# Patient Record
Sex: FEMALE | Race: WHITE | URBAN - METROPOLITAN AREA
[De-identification: names, ages, dates, MRNs, and addresses within clinical notes are randomized per-mention and may not be internally consistent; named-entity substitution may affect disease eponyms.]

---

## 2020-06-10 ENCOUNTER — LAB OUTSIDE AN ENCOUNTER (OUTPATIENT)
Dept: URBAN - METROPOLITAN AREA CLINIC 98 | Facility: CLINIC | Age: 55
End: 2020-06-10

## 2020-06-11 LAB
A/G RATIO: 1.5
ABSOLUTE BASOPHILS: 53
ABSOLUTE EOSINOPHILS: 137
ABSOLUTE LYMPHOCYTES: 1072
ABSOLUTE MONOCYTES: 540
ABSOLUTE NEUTROPHILS: 5799
ALBUMIN: 4.6
ALKALINE PHOSPHATASE: 47
ALT (SGPT): 7
AST (SGOT): 11
BASOPHILS: 0.7
BILIRUBIN, TOTAL: 0.3
BUN/CREATININE RATIO: (no result)
BUN: 18
C-REACTIVE PROTEIN, QUANT: 19.4
CALCIUM: 9.8
CARBON DIOXIDE, TOTAL: 21
CHLORIDE: 108
CREATININE: 0.76
EGFR AFRICAN AMERICAN: 103
EGFR NON-AFR. AMERICAN: 89
EOSINOPHILS: 1.8
FERRITIN, SERUM: 2204
FOLATE, SERUM: >24
GLOBULIN, TOTAL: 3
GLUCOSE: 91
HEMATOCRIT: 35.5
HEMOGLOBIN: 12.4
IRON BIND.CAP.(TIBC): 288
IRON SATURATION: 48
IRON: 139
LYMPHOCYTES: 14.1
MCH: 36.9
MCHC: 34.9
MCV: 105.7
MONOCYTES: 7.1
MPV: 10.5
NEUTROPHILS: 76.3
PLATELET COUNT: 266
POTASSIUM: 3.6
PROTEIN, TOTAL: 7.6
RDW: 12.5
RED BLOOD CELL COUNT: 3.36
SED RATE BY MODIFIED: 56
SODIUM: 134
VITAMIN B12: 1636
VITAMIN D,25-OH,TOTAL,IA: 30
WHITE BLOOD CELL COUNT: 7.6

## 2020-07-28 ENCOUNTER — LAB OUTSIDE AN ENCOUNTER (OUTPATIENT)
Dept: URBAN - METROPOLITAN AREA CLINIC 98 | Facility: CLINIC | Age: 55
End: 2020-07-28

## 2020-07-29 LAB
A/G RATIO: 1.3
ABSOLUTE BASOPHILS: 59
ABSOLUTE EOSINOPHILS: 189
ABSOLUTE LYMPHOCYTES: 1151
ABSOLUTE MONOCYTES: 241
ABSOLUTE NEUTROPHILS: 4862
ALBUMIN: 4
ALKALINE PHOSPHATASE: 45
ALT (SGPT): 6
AST (SGOT): 10
BASOPHILS: 0.9
BILIRUBIN, TOTAL: 0.2
BUN/CREATININE RATIO: (no result)
BUN: 20
C-REACTIVE PROTEIN, QUANT: 34.5
CALCIUM: 9.3
CARBON DIOXIDE, TOTAL: 25
CHLORIDE: 104
CREATININE: 1
EGFR AFRICAN AMERICAN: 74
EGFR NON-AFR. AMERICAN: 64
EOSINOPHILS: 2.9
FERRITIN, SERUM: 1688
FOLATE (FOLIC ACID), SERUM: >24
GLOBULIN, TOTAL: 3.1
GLUCOSE: 98
HEMATOCRIT: 30.3
HEMOGLOBIN: 10.3
IRON BIND.CAP.(TIBC): 271
IRON SATURATION: 57
IRON: 155
LYMPHOCYTES: 17.7
MAGNESIUM: 1.4
MCH: 36.3
MCHC: 34
MCV: 106.7
MONOCYTES: 3.7
MPV: 10.5
NEUTROPHILS: 74.8
PLATELET COUNT: 301
POTASSIUM: 4.6
PROTEIN, TOTAL: 7.1
RDW: 12.6
RED BLOOD CELL COUNT: 2.84
SED RATE BY MODIFIED: 62
SODIUM: 137
VITAMIN B12: 591
VITAMIN D,25-OH,TOTAL,IA: 36
WHITE BLOOD CELL COUNT: 6.5

## 2020-08-06 ENCOUNTER — OFFICE VISIT (OUTPATIENT)
Dept: URBAN - METROPOLITAN AREA TELEHEALTH 2 | Facility: TELEHEALTH | Age: 55
End: 2020-08-06
Payer: COMMERCIAL

## 2020-08-06 DIAGNOSIS — K80.20 CHOLELITHIASIS: ICD-10-CM

## 2020-08-06 DIAGNOSIS — R19.7 DIARRHEA: ICD-10-CM

## 2020-08-06 DIAGNOSIS — R10.84 ABDOMINAL PAIN, GENERALIZED: ICD-10-CM

## 2020-08-06 DIAGNOSIS — K50.90 CROHN DISEASE: ICD-10-CM

## 2020-08-06 PROCEDURE — 99215 OFFICE O/P EST HI 40 MIN: CPT | Performed by: INTERNAL MEDICINE

## 2020-08-06 RX ORDER — RABEPRAZOLE SODIUM 20 MG/1
TAKE 1 TABLET BY MOUTH TWICE DAILY TABLET, DELAYED RELEASE ORAL
Qty: 180 | Refills: 3 | COMMUNITY
Start: 2020-02-26

## 2020-08-06 RX ORDER — METHOTREXATE 25 MG/ML
1 CC INJECTION, SOLUTION INTRA-ARTERIAL; INTRAMUSCULAR; INTRAVENOUS
Qty: 2 | Refills: 5 | OUTPATIENT

## 2020-08-06 RX ORDER — VEDOLIZUMAB 300 MG/5ML
INFUSE 300 MG OVER 30 MINUTE(S) BY INTRAVENOUS ROUTE EVERY 4 WEEKS INJECTION, POWDER, LYOPHILIZED, FOR SOLUTION INTRAVENOUS
Qty: 1 | Refills: 11 | COMMUNITY
Start: 1900-01-01

## 2020-08-06 RX ORDER — LETROZOLE TABLETS 2.5 MG/1
TAKE 1 TABLET (2.5 MG) BY ORAL ROUTE ONCE DAILY TABLET, FILM COATED ORAL 1
Qty: 0 | Refills: 0 | COMMUNITY
Start: 1900-01-01

## 2020-08-06 RX ORDER — RABEPRAZOLE SODIUM 20 MG/1
TAKE 1 TABLET BY MOUTH TWICE DAILY TABLET, DELAYED RELEASE ORAL
Qty: 180 | Refills: 3 | COMMUNITY
Start: 2019-02-17

## 2020-08-06 RX ORDER — DIPHENOXYLATE HCL/ATROPINE 2.5-.025MG
TAKE 2 TABLETS (5 MG) BY ORAL ROUTE 2 TIMES PER DAY FOR 90 DAYS TABLET ORAL 2
Qty: 360 | Refills: 0 | COMMUNITY
Start: 1900-01-01

## 2020-08-06 RX ORDER — ALENDRONATE SODIUM 70 MG
TABLET ORAL
Qty: 0 | Refills: 0 | COMMUNITY
Start: 1900-01-01

## 2020-08-06 RX ORDER — CYANOCOBALAMIN 1000 UG/ML
INJECT 1ML INTRAMUSCULARLY ONCE EVERY MONTH INJECTION INTRAMUSCULAR; SUBCUTANEOUS
Qty: 12 | Refills: 0 | COMMUNITY
Start: 2018-07-02

## 2020-08-06 RX ORDER — IBUPROFEN 100 MG/1
TAKE 2 TABLETS (200 MG) BY ORAL ROUTE EVERY 6 HOURS AS NEEDED WITH FOOD TABLET, COATED ORAL
Qty: 0 | Refills: 0 | COMMUNITY
Start: 1900-01-01

## 2020-08-06 RX ORDER — IBUPROFEN 200 MG/1
TAKE 1 CAPSULE (200 MG) BY ORAL ROUTE EVERY 6 HOURS AS NEEDED CAPSULE, LIQUID FILLED ORAL
Qty: 0 | Refills: 0 | COMMUNITY
Start: 1900-01-01

## 2020-08-06 RX ORDER — METHOTREXATE SODIUM 25 MG/ML
INJECT 0.5 ML  BY SUBCUTANEOUS ROUTE ONCE WEEKLY INJECTION, SOLUTION INTRA-ARTERIAL; INTRAMUSCULAR; INTRAVENOUS
Qty: 1 | Refills: 0 | COMMUNITY
Start: 1900-01-01

## 2020-08-06 NOTE — HPI-TODAY'S VISIT:
55 yo female with Crohnn's disease having continued active syx. Getting Entyvio every 4 weeks. Methotrexate 12.5 mg per week Dicyclomine tablets 2 po qid  == She has taken Xifaxan and it tears her up.   Then stop it. Does not like flagyl and does not help.  Pepto 1-2 doses a day, does help the diarreha. She uses Immodium every day.  Stooling ----- empties bag 6-10 times a day. Abdominal pain - when she eats and intermittent. Energy - gone.  Labs- Anemia - Hb lower CRP is higher ---34 (was 16).   ========================== 20  Crohn's disease.     55 yo female with Crohn's disease comes in for f/u.  Dr. Jaquez is considering a GYN etiology.  Needs a pouch exam.  Getting Entyvio every 4 weeks.  Stelara did not work as well as Entyvio.  Normally when she gets an infusion, she gets a bounce and has not.  Prometheus Vedo level 3/25, just before 4 week dose was great_ __ _32.5 and no antibodies.  Feb 10 labs looked good CRP 29.9 and cbc looked fine.  ESR elevation.  2 liters of miralax_ __ _ Try 6-8am _ __ _- and then an exam at 2-3 pm She asked about a pilonidal cyst and I directed to Dr. Talamantes  Xifaxan  Try pepto bismol 1-2 prn on days.    ===== 20  55 yo female comes in at my request for 3 week f/u of Crohn's due to new 4 x eleation of CRP at 29.9.  19 CRP was similarly elevated 4x at 2.1/.5  Leonid is checking CT abd-pelvis due to pelvic pain which is not characteristic of her Crohn's.  She is wondering about GYN.  Or spastic bladder.  Dr. Talamantes thought stable but wants to scope to be sure.  Empties bag 10x a day.That is more than 3-6 months ago and has leakage of the ostomy bag. Maybe more volume.  Very watery.  Review of labs from 2/10 show cRP 29.i9 but others are stable. CBC looks good with trace elevation of wbc and borderline anemia. Getting iv iron from Arianna Jaquez. Vit D is normal at 39. Albumin is normal at 4.5.  MRI 10/1/19 showed gallstones , present in past, and she has mild pain that is intermittent Liver tests are nl. Can assess with HIDA, and/or with surgical consult, e.g ???relative need for choly, high vs. low.    ================ 20  55 yo female with severe Crohn's disease and h/o breast cancer _ _ Arianna Jaquez MD on Letrasol. 10th year.  Abdominal pain is confusing.  Has gotten pain in the ovary and in the uterus. GYN-Ann Laird MD and did US was fine. Sent to PT for pelvic treatment.  At her comment, I reviewed GB imaging 16 nl CTE 5/3/17 nl GB US Oct 2 2019 "Cholelithiasis"   o/w MRE was nl - no inflammation. BUT THE MRI DID SHOW "PERSISTENT TETHERING OF UTERUS TO RECTAL STUMP"  On Entyvio 300 mg q 4 weeks.  Next dose in 2 weeks On mtx _ _.5 cc. Monthly b12 shots  Eating ok, some weight loss. Weight stable x 6 months and down 30 lb since _ __ _- she is comfortable with current weight.  ============= 19  55 yo female with severe Crohn's and h/o breast cancer comes in for 3 month f/u.  Doing poorly.  Daily abd pain which is intermittent.  When she eats she gets pain and nausea.  Taking zofran and phenergan.  Sleep pattern is awry.  Empties bag 5 times a day and going out of rectum.  Has a loop ileostomy that may be problematic.  Allergic to tape.  Currently on Entyvio 300 mg q 8 weeks.  Stelara did not work as well.  Discussed the entyvio at shorter cycles of q 4 weeks to see if that will control syx better.  Outputs are higher and she may be volume depleted and maybe more entyvio will help.  last entyvio was last week and so a level won't be useful today.  Will just proceed with dose increase.    ============== 18 53 yo female with Crohn's disease and breast cancer and diverting ileostomy from Dr. Talamantes.  Skin issue - addressed by Dr. Best.  See Brooke Talamantes MD and enterostomal therapist.  AGWS.  No fcs.      Review of Systems  ConstitutionalDenies : body aches, fever, weight gain, weight loss  CardiovascularDenies : chest pain, heart racing/skipping, high blood pressure  RespiratoryDenies : chronic cough, shortness of breath, wheezing or asthma symptoms  GastrointestinalDenies : Abdominal Pain/discomfort, Anal/Rectal Pain or Itching, Anal Spasm, Black Stool, Bloating/belching/gaseouness, Change of Bowel Habit, Constipation, Diarrhea/Loose Stool, Difficulty in Swallowing, Heartburn/esophageal reflux, Hemorrhoids, Indigestion, Mucus in Stool, Nausea/vomiting, Rectal Bleeding, Unintentional Weight Loss    Vitals  Date Time BP Position Site L\R Cuff Size HR RR TEMP (F) WT  HT  BMI kg/m2 BSA m2 O2 Sat HC     2020 05:15 PM         133lbs 0oz 5'  8.5" 19.93 1.71          Assessment  Crohn's Disease     555.2  Diarrhea     787.91/R19.7  Cholelithiasis     574.20/K80.20  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 () - - 2020  EGD w/Biopsy if indicated (82985) - - 2020  Pouchoscopy (98828) - - 2020  CMP (comprehensive metabolic panel) (71844) - - 2020  Sed rate (88370) - - 2020  C-reactive protein (19994) - - 2020  Ferritin assay (77304) - - 2020  Iron + iron binding capacity panel ser/plas (87281, 79383) - - 2020  Vitamin B12 and folate measurement (03222, 40701) - - 2020  CBC with diff (00078) - - 2020  25-OH-Vitamin D (16477) - - 2020  GPP 22 (FilmArray) - Gastrointestinal Pathogen Panel - QDx (63810) - - 2020  MedicationsMedications have been Reconciled  Transition of Care or Provider Policy  InstructionsPatient seen today via telehealth by agreement and consent of patient in light of current COVID-19 pandemic. I used video conferencing during the visit. The patient encounter is appropriate and reasonable under the circumstances given the patient's particular presentation at this time. The patient has been advised of the followin) the potential risks and limitations of this mode of treatment (including but not limited to the absence of in-person examination); 2) the right to refuse telehealth services at any point without affecting the right to future care; 3) the right to receive in-person services, included immediately after this consultation if an urgent need arises; 4) information, including identifiable images or information from this telehealth consult, will only be shared in accordance with HIPPA regulations. Any and all of the patient's and/or patient's family member's questions on this issue have been answered. The patient has verbally consented to be treated via telehealth services. The patient has also been advised to contact this office for worsening conditions or problems, and seek emergency medical treatment and/or call 911 if the patient deems either necessary.  (For commercial payers, telehealth video only) More than half of the face-to-face time used for counseling and coordination of care.  40 min appointment (21664 EP)  I have documented a list of current medications and reviewed it with the patient.  I encouraged the patient to diet and exercise.  DispositionReturn To Clinic in 2 Months  CorrespondenceCC this document (Slade Auguste MD, Brooke Talamantes MD, Arianna Jaquez MD) - 2020    34636 exam med 32 min time  needs refill or lomotil Can't eRx lomotil.  Aciphex refill.  Needs EGD and pouchoscopy         Electronically Signed by: MD Cindy CarpioA

## 2020-08-14 ENCOUNTER — TELEPHONE ENCOUNTER (OUTPATIENT)
Dept: URBAN - METROPOLITAN AREA CLINIC 98 | Facility: CLINIC | Age: 55
End: 2020-08-14

## 2020-08-14 RX ORDER — DIPHENOXYLATE HCL/ATROPINE 2.5-.025MG
TAKE 2 TABLETS (5 MG) BY ORAL ROUTE 2 TIMES PER DAY FOR 90 DAYS TABLET ORAL 2
Qty: 360 | Refills: 0
Start: 1900-01-01

## 2020-08-19 ENCOUNTER — TELEPHONE ENCOUNTER (OUTPATIENT)
Dept: URBAN - METROPOLITAN AREA CLINIC 98 | Facility: CLINIC | Age: 55
End: 2020-08-19

## 2020-08-19 RX ORDER — DIPHENOXYLATE HCL/ATROPINE 2.5-.025MG
TAKE 2 TABLETS (5 MG) BY ORAL ROUTE 2 TIMES PER DAY FOR 90 DAYS TABLET ORAL 2
Qty: 360 | Refills: 0
Start: 1900-01-01

## 2020-09-28 ENCOUNTER — TELEPHONE ENCOUNTER (OUTPATIENT)
Dept: URBAN - METROPOLITAN AREA CLINIC 98 | Facility: CLINIC | Age: 55
End: 2020-09-28

## 2020-10-02 ENCOUNTER — TELEPHONE ENCOUNTER (OUTPATIENT)
Dept: URBAN - METROPOLITAN AREA CLINIC 98 | Facility: CLINIC | Age: 55
End: 2020-10-02

## 2020-10-02 ENCOUNTER — OFFICE VISIT (OUTPATIENT)
Dept: URBAN - METROPOLITAN AREA TELEHEALTH 2 | Facility: TELEHEALTH | Age: 55
End: 2020-10-02
Payer: COMMERCIAL

## 2020-10-02 DIAGNOSIS — R19.7 DIARRHEA: ICD-10-CM

## 2020-10-02 DIAGNOSIS — K50.80 CROHN'S DISEASE OF BOTH SMALL AND LARGE INTESTINE: ICD-10-CM

## 2020-10-02 DIAGNOSIS — K80.20 CHOLELITHIASIS: ICD-10-CM

## 2020-10-02 DIAGNOSIS — R10.84 ABDOMINAL PAIN, GENERALIZED: ICD-10-CM

## 2020-10-02 PROCEDURE — 99215 OFFICE O/P EST HI 40 MIN: CPT | Performed by: INTERNAL MEDICINE

## 2020-10-02 RX ORDER — RABEPRAZOLE SODIUM 20 MG/1
TAKE 1 TABLET BY MOUTH TWICE DAILY TABLET, DELAYED RELEASE ORAL
Qty: 180 | Refills: 3 | Status: ACTIVE | COMMUNITY
Start: 2019-02-17

## 2020-10-02 RX ORDER — VEDOLIZUMAB 300 MG/5ML
INFUSE 300 MG OVER 30 MINUTE(S) BY INTRAVENOUS ROUTE EVERY 4 WEEKS INJECTION, POWDER, LYOPHILIZED, FOR SOLUTION INTRAVENOUS
Qty: 1 | Refills: 11 | Status: ACTIVE | COMMUNITY
Start: 1900-01-01

## 2020-10-02 RX ORDER — METHOTREXATE 25 MG/ML
1 CC INJECTION, SOLUTION INTRA-ARTERIAL; INTRAMUSCULAR; INTRAVENOUS
Qty: 2 | Refills: 5 | Status: ACTIVE | COMMUNITY

## 2020-10-02 RX ORDER — METHOTREXATE SODIUM 25 MG/ML
INJECT 0.5 ML  BY SUBCUTANEOUS ROUTE ONCE WEEKLY INJECTION, SOLUTION INTRA-ARTERIAL; INTRAMUSCULAR; INTRAVENOUS
Qty: 1 | Refills: 0 | Status: ACTIVE | COMMUNITY
Start: 1900-01-01

## 2020-10-02 RX ORDER — RABEPRAZOLE SODIUM 20 MG/1
TAKE 1 TABLET BY MOUTH TWICE DAILY TABLET, DELAYED RELEASE ORAL
Qty: 180 | Refills: 3 | Status: ACTIVE | COMMUNITY
Start: 2020-02-26

## 2020-10-02 RX ORDER — PREDNISONE 10 MG/1
TAKE 40 MG DAILY TABLET ORAL ONCE A DAY
Status: ACTIVE | COMMUNITY

## 2020-10-02 RX ORDER — PROMETHAZINE HYDROCHLORIDE 25 MG/1
1 TABLET AS NEEDED TABLET ORAL BID
Qty: 60 TABLET | Refills: 5 | OUTPATIENT
Start: 2020-10-02 | End: 2021-03-31

## 2020-10-02 RX ORDER — LETROZOLE TABLETS 2.5 MG/1
TAKE 1 TABLET (2.5 MG) BY ORAL ROUTE ONCE DAILY TABLET, FILM COATED ORAL 1
Qty: 0 | Refills: 0 | Status: ACTIVE | COMMUNITY
Start: 1900-01-01

## 2020-10-02 RX ORDER — IBUPROFEN 100 MG/1
TAKE 2 TABLETS (200 MG) BY ORAL ROUTE EVERY 6 HOURS AS NEEDED WITH FOOD TABLET, COATED ORAL
Qty: 0 | Refills: 0 | Status: ACTIVE | COMMUNITY
Start: 1900-01-01

## 2020-10-02 RX ORDER — DIPHENOXYLATE HYDROCHLORIDE AND ATROPINE SULFATE 2.5; .025 MG/1; MG/1
TAKE 2 TABLETS TABLET ORAL BID
Qty: 360 TABLETS | Refills: 3 | OUTPATIENT
Start: 2020-10-02 | End: 2021-09-27

## 2020-10-02 RX ORDER — IBUPROFEN 200 MG/1
TAKE 1 CAPSULE (200 MG) BY ORAL ROUTE EVERY 6 HOURS AS NEEDED CAPSULE, LIQUID FILLED ORAL
Qty: 0 | Refills: 0 | Status: ACTIVE | COMMUNITY
Start: 1900-01-01

## 2020-10-02 RX ORDER — ALENDRONATE SODIUM 70 MG
TABLET ORAL
Qty: 0 | Refills: 0 | Status: ACTIVE | COMMUNITY
Start: 1900-01-01

## 2020-10-02 RX ORDER — DIPHENOXYLATE HCL/ATROPINE 2.5-.025MG
TAKE 2 TABLETS (5 MG) BY ORAL ROUTE 2 TIMES PER DAY FOR 90 DAYS TABLET ORAL 2
Qty: 360 | Refills: 0 | Status: ACTIVE | COMMUNITY
Start: 1900-01-01

## 2020-10-02 RX ORDER — CYANOCOBALAMIN 1000 UG/ML
INJECT 1ML INTRAMUSCULARLY ONCE EVERY MONTH INJECTION INTRAMUSCULAR; SUBCUTANEOUS
Qty: 12 | Refills: 0 | Status: ACTIVE | COMMUNITY
Start: 2018-07-02

## 2020-10-02 NOTE — HPI-TODAY'S VISIT:
56 yo female with  Crohn's disease recently hosp 3 days at  for abd pain, N/V and got better with 3 dasy 3 days of iv steroids.  Surgery considered.  Was on 40 mg pred - intolerant anxiety  Now dropped to 30 mg a day. Now is 50-60% better than admission.  Surgery debated gallstones. They leaned toward blockage.  Has been on Stelara. Entyvio works better than Stelara. Mtx makes her tired She would like to up the dose of lomotil.  20  53 yo female with Crohnn's disease having continued active syx. Getting Entyvio every 4 weeks. Methotrexate 12.5 mg per week Dicyclomine tablets 2 po qid  == She has taken Xifaxan and it tears her up.   Then stop it. Does not like flagyl and does not help.  Pepto 1-2 doses a day, does help the diarreha. She uses Immodium every day.  Stooling ----- empties bag 6-10 times a day. Abdominal pain - when she eats and intermittent. Energy - gone.  Labs- Anemia - Hb lower CRP is higher ---34 (was 16).   ========================== 20  Crohn's disease.     53 yo female with Crohn's disease comes in for f/u.  Dr. Jaquez is considering a GYN etiology.  Needs a pouch exam.  Getting Entyvio every 4 weeks.  Stelara did not work as well as Entyvio.  Normally when she gets an infusion, she gets a bounce and has not.  Prometheus Vedo level 3/25, just before 4 week dose was great_ __ _32.5 and no antibodies.  Feb 10 labs looked good CRP 29.9 and cbc looked fine.  ESR elevation.  2 liters of miralax_ __ _ Try 6-8am _ __ _- and then an exam at 2-3 pm She asked about a pilonidal cyst and I directed to Dr. Albin Owen  Try pepto bismol 1-2 prn on days.    ===== 20  53 yo female comes in at my request for 3 week f/u of Crohn's due to new 4 x eleation of CRP at 29.9.  19 CRP was similarly elevated 4x at 2.1/.5  Leonid is checking CT abd-pelvis due to pelvic pain which is not characteristic of her Crohn's.  She is wondering about GYN.  Or spastic bladder.  Dr. Talamantes thought stable but wants to scope to be sure.  Empties bag 10x a day.That is more than 3-6 months ago and has leakage of the ostomy bag. Maybe more volume.  Very watery.  Review of labs from 2/10 show cRP 29.i9 but others are stable. CBC looks good with trace elevation of wbc and borderline anemia. Getting iv iron from Arianna Jaquez. Vit D is normal at 39. Albumin is normal at 4.5.  MRI 10/1/19 showed gallstones , present in past, and she has mild pain that is intermittent Liver tests are nl. Can assess with HIDA, and/or with surgical consult, e.g ???relative need for choly, high vs. low.    ================ 20  53 yo female with severe Crohn's disease and h/o breast cancer _ _ Arianna Jaquez MD on Letrasol. 10th year.  Abdominal pain is confusing.  Has gotten pain in the ovary and in the uterus. GYN-Ann Laird MD and did US was fine. Sent to PT for pelvic treatment.  At her comment, I reviewed GB imaging 16 nl CTE 5/3/17 nl GB US Oct 2 2019 "Cholelithiasis"   o/w MRE was nl - no inflammation. BUT THE MRI DID SHOW "PERSISTENT TETHERING OF UTERUS TO RECTAL STUMP"  On Entyvio 300 mg q 4 weeks.  Next dose in 2 weeks On mtx _ _.5 cc. Monthly b12 shots  Eating ok, some weight loss. Weight stable x 6 months and down 30 lb since _ __ _- she is comfortable with current weight.  ============= 19  53 yo female with severe Crohn's and h/o breast cancer comes in for 3 month f/u.  Doing poorly.  Daily abd pain which is intermittent.  When she eats she gets pain and nausea.  Taking zofran and phenergan.  Sleep pattern is awry.  Empties bag 5 times a day and going out of rectum.  Has a loop ileostomy that may be problematic.  Allergic to tape.  Currently on Entyvio 300 mg q 8 weeks.  Stelara did not work as well.  Discussed the entyvio at shorter cycles of q 4 weeks to see if that will control syx better.  Outputs are higher and she may be volume depleted and maybe more entyvio will help.  last entyvio was last week and so a level won't be useful today.  Will just proceed with dose increase.    ============== 18 53 yo female with Crohn's disease and breast cancer and diverting ileostomy from Dr. Talamantes.  Skin issue - addressed by Dr. Best.  See Brooke Talamantes MD and enterostomal therapist.  AGWS.  No fcs.      Review of Systems  ConstitutionalDenies : body aches, fever, weight gain, weight loss  CardiovascularDenies : chest pain, heart racing/skipping, high blood pressure  RespiratoryDenies : chronic cough, shortness of breath, wheezing or asthma symptoms  GastrointestinalDenies : Abdominal Pain/discomfort, Anal/Rectal Pain or Itching, Anal Spasm, Black Stool, Bloating/belching/gaseouness, Change of Bowel Habit, Constipation, Diarrhea/Loose Stool, Difficulty in Swallowing, Heartburn/esophageal reflux, Hemorrhoids, Indigestion, Mucus in Stool, Nausea/vomiting, Rectal Bleeding, Unintentional Weight Loss    Vitals  Date Time BP Position Site L\R Cuff Size HR RR TEMP (F) WT  HT  BMI kg/m2 BSA m2 O2 Sat HC     2020 05:15 PM         133lbs 0oz 5'  8.5" 19.93 1.71          Assessment  Crohn's Disease     555.2  Diarrhea     787.91/R19.7  Cholelithiasis     574.20/K80.20  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 () - - 2020  EGD w/Biopsy if indicated (80197) - - 2020  Pouchoscopy (18274) - - 2020  CMP (comprehensive metabolic panel) (09838) - - 2020  Sed rate (39018) - - 2020  C-reactive protein (54392) - - 2020  Ferritin assay (36210) - - 2020  Iron + iron binding capacity panel ser/plas (79146, 15119) - - 2020  Vitamin B12 and folate measurement (19139, 06458) - - 2020  CBC with diff (19151) - - 2020  25-OH-Vitamin D (15651) - - 2020  GPP 22 (FilmArray) - Gastrointestinal Pathogen Panel - QDx (91492) - - 2020  MedicationsMedications have been Reconciled  Transition of Care or Provider Policy  InstructionsPatient seen today via telehealth by agreement and consent of patient in light of current COVID-19 pandemic. I used video conferencing during the visit. The patient encounter is appropriate and reasonable under the circumstances given the patient's particular presentation at this time. The patient has been advised of the followin) the potential risks and limitations of this mode of treatment (including but not limited to the absence of in-person examination); 2) the right to refuse telehealth services at any point without affecting the right to future care; 3) the right to receive in-person services, included immediately after this consultation if an urgent need arises; 4) information, including identifiable images or information from this telehealth consult, will only be shared in accordance with HIPPA regulations. Any and all of the patient's and/or patient's family member's questions on this issue have been answered. The patient has verbally consented to be treated via telehealth services. The patient has also been advised to contact this office for worsening conditions or problems, and seek emergency medical treatment and/or call 911 if the patient deems either necessary.  (For commercial payers, telehealth video only) More than half of the face-to-face time used for counseling and coordination of care.  40 min appointment (55388 EP)  I have documented a list of current medications and reviewed it with the patient.  I encouraged the patient to diet and exercise.  DispositionReturn To Clinic in 2 Months  CorrespondenceCC this document (Slade Auguste MD, Brooke Talamantes MD, Arianna Jaquez MD) - 2020    52724 exam med 32 min time  needs refill or lomotil Can't eRx lomotil.  Aciphex refill.  Needs EGD and pouchoscopy         Electronically Signed by: MD MIGUEL A Carpio

## 2020-10-05 ENCOUNTER — LAB OUTSIDE AN ENCOUNTER (OUTPATIENT)
Dept: URBAN - METROPOLITAN AREA CLINIC 98 | Facility: CLINIC | Age: 55
End: 2020-10-05

## 2020-10-05 LAB
ABSOLUTE BASOPHIL COUNT: 0
ABSOLUTE EOSINOPHIL COUNT: 0.02
ABSOLUTE IMMATURE GRANULOCYTE  COUNT: 0.03
ABSOLUTE LYMPHOCYTE COUNT: 0.4
ABSOLUTE MONOCYTE COUNT: 0.17
ABSOLUTE NEUTROPHIL COUNT (ANC): 4.18
ABSOLUTE NRBC  COUNT: 0
AG RATIO: 1
ALBUMIN LEVEL: 3.7
ALK PHOS: 40
ALT: 11
ANION GAP: 7
AST: 9
BASOPHIL AUTO: 0
BILIRUBIN TOTAL: 0.2
BUN/CREAT RATIO: 29
BUN: 20
CALCIUM LEVEL: 8.1
CHLORIDE LEVEL: 107
CO2 LEVEL: 24
CREATININE LEVEL: 0.7
CRP: 0.28
EOS AUTO: 0
ESR WESTERGREN: 5
GFR AFRICAN AMERICAN: >60
GFR NON AFRICAN AMERICAN: >60
GLUCOSE LEVEL: 115
HCT: 29.6
HGB: 9.6
IMMATURE GRANULOCYTES AUTO: 0.6
LYMPH AUTO: 8
MCH: 36.1
MCHC: 32.4
MCV: 111.3
MONO AUTO: 4
MPV: 10.7
NEUTRO AUTO: 87
NRBC AUTO: 0
OSMO (CALC): 279
PERFORMING LAB: (no result)
PLATELETS: 190
POTASSIUM LEVEL: 4
PROTEIN TOTAL: 6.4
RBC: 2.66
RDW: 13.2
SLIDE REVIEW: (no result)
SODIUM LEVEL: 138
WBC: 4.8

## 2020-10-08 ENCOUNTER — TELEPHONE ENCOUNTER (OUTPATIENT)
Dept: URBAN - METROPOLITAN AREA CLINIC 92 | Facility: CLINIC | Age: 55
End: 2020-10-08

## 2020-10-12 LAB
PERFORMING LAB: (no result)
SOURCE: (no result)

## 2020-10-17 LAB
A/G RATIO: 1.8
ALBUMIN: 4.6
ALKALINE PHOSPHATASE: 60
ALT (SGPT): 14
AST (SGOT): 9
BASO (ABSOLUTE): 0
BASOS: 1
BILIRUBIN, TOTAL: 0.2
BUN/CREATININE RATIO: 12
BUN: 9
C-REACTIVE PROTEIN, QUANT: 22
CALCIUM: 9.3
CARBON DIOXIDE, TOTAL: 20
CHLORIDE: 103
CREATININE: 0.77
EGFR IF AFRICN AM: 101
EGFR IF NONAFRICN AM: 88
EOS (ABSOLUTE): 0.1
EOS: 1
FERRITIN, SERUM: 1546
GLOBULIN, TOTAL: 2.6
GLUCOSE: 90
HEMATOCRIT: 31.6
HEMATOLOGY COMMENTS:: (no result)
HEMOGLOBIN: 10.9
IMMATURE CELLS: (no result)
IMMATURE GRANS (ABS): 0
IMMATURE GRANULOCYTES: 1
IRON BIND.CAP.(TIBC): 206
IRON SATURATION: 33
IRON: 68
LYMPHS (ABSOLUTE): 1
LYMPHS: 15
MCH: 37.6
MCHC: 34.5
MCV: 109
MONOCYTES(ABSOLUTE): 0.7
MONOCYTES: 10
NEUTROPHILS (ABSOLUTE): 4.9
NEUTROPHILS: 72
NRBC: (no result)
PLATELETS: 120
POTASSIUM: 3.5
PROTEIN, TOTAL: 7.2
RBC: 2.9
RDW: 12.9
SEDIMENTATION RATE-WESTERGREN: 56
SODIUM: 139
UIBC: 138
WBC: 6.6

## 2020-10-23 ENCOUNTER — OFFICE VISIT (OUTPATIENT)
Dept: URBAN - METROPOLITAN AREA TELEHEALTH 2 | Facility: TELEHEALTH | Age: 55
End: 2020-10-23
Payer: COMMERCIAL

## 2020-10-23 DIAGNOSIS — R10.84 ABDOMINAL PAIN, GENERALIZED: ICD-10-CM

## 2020-10-23 DIAGNOSIS — R19.7 DIARRHEA: ICD-10-CM

## 2020-10-23 DIAGNOSIS — K50.80 CROHN'S DISEASE OF BOTH SMALL AND LARGE INTESTINE: ICD-10-CM

## 2020-10-23 DIAGNOSIS — Z79.899 IMMUNOSUPPRESSION DUE TO DRUG THERAPY: ICD-10-CM

## 2020-10-23 DIAGNOSIS — K80.20 CHOLELITHIASIS: ICD-10-CM

## 2020-10-23 PROCEDURE — 99214 OFFICE O/P EST MOD 30 MIN: CPT | Performed by: INTERNAL MEDICINE

## 2020-10-23 RX ORDER — RABEPRAZOLE SODIUM 20 MG/1
TAKE 1 TABLET BY MOUTH TWICE DAILY TABLET, DELAYED RELEASE ORAL
Qty: 180 | Refills: 3 | Status: ACTIVE | COMMUNITY
Start: 2019-02-17

## 2020-10-23 RX ORDER — METHOTREXATE SODIUM 25 MG/ML
INJECT 0.5 ML  BY SUBCUTANEOUS ROUTE ONCE WEEKLY INJECTION, SOLUTION INTRA-ARTERIAL; INTRAMUSCULAR; INTRAVENOUS
Qty: 1 | Refills: 0 | Status: ACTIVE | COMMUNITY
Start: 1900-01-01

## 2020-10-23 RX ORDER — VEDOLIZUMAB 300 MG/5ML
INFUSE 300 MG OVER 30 MINUTE(S) BY INTRAVENOUS ROUTE EVERY 4 WEEKS INJECTION, POWDER, LYOPHILIZED, FOR SOLUTION INTRAVENOUS
Qty: 1 | Refills: 11 | Status: ACTIVE | COMMUNITY
Start: 1900-01-01

## 2020-10-23 RX ORDER — PREDNISONE 10 MG/1
TAKE 40 MG DAILY TABLET ORAL ONCE A DAY
Status: ACTIVE | COMMUNITY

## 2020-10-23 RX ORDER — ALENDRONATE SODIUM 70 MG
TABLET ORAL
Qty: 0 | Refills: 0 | Status: ACTIVE | COMMUNITY
Start: 1900-01-01

## 2020-10-23 RX ORDER — LETROZOLE TABLETS 2.5 MG/1
TAKE 1 TABLET (2.5 MG) BY ORAL ROUTE ONCE DAILY TABLET, FILM COATED ORAL 1
Qty: 0 | Refills: 0 | Status: ACTIVE | COMMUNITY
Start: 1900-01-01

## 2020-10-23 RX ORDER — DIPHENOXYLATE HCL/ATROPINE 2.5-.025MG
TAKE 2 TABLETS (5 MG) BY ORAL ROUTE 2 TIMES PER DAY FOR 90 DAYS TABLET ORAL 2
Qty: 360 | Refills: 0 | Status: ACTIVE | COMMUNITY
Start: 1900-01-01

## 2020-10-23 RX ORDER — PROMETHAZINE HYDROCHLORIDE 25 MG/1
1 TABLET AS NEEDED TABLET ORAL BID
Qty: 60 TABLET | Refills: 5 | Status: ACTIVE | COMMUNITY
Start: 2020-10-02 | End: 2021-03-31

## 2020-10-23 RX ORDER — IBUPROFEN 200 MG/1
TAKE 1 CAPSULE (200 MG) BY ORAL ROUTE EVERY 6 HOURS AS NEEDED CAPSULE, LIQUID FILLED ORAL
Qty: 0 | Refills: 0 | Status: ACTIVE | COMMUNITY
Start: 1900-01-01

## 2020-10-23 RX ORDER — RABEPRAZOLE SODIUM 20 MG/1
TAKE 1 TABLET BY MOUTH TWICE DAILY TABLET, DELAYED RELEASE ORAL
Qty: 180 | Refills: 3 | Status: ACTIVE | COMMUNITY
Start: 2020-02-26

## 2020-10-23 RX ORDER — IBUPROFEN 100 MG/1
TAKE 2 TABLETS (200 MG) BY ORAL ROUTE EVERY 6 HOURS AS NEEDED WITH FOOD TABLET, COATED ORAL
Qty: 0 | Refills: 0 | Status: ACTIVE | COMMUNITY
Start: 1900-01-01

## 2020-10-23 RX ORDER — CYANOCOBALAMIN 1000 UG/ML
INJECT 1ML INTRAMUSCULARLY ONCE EVERY MONTH INJECTION INTRAMUSCULAR; SUBCUTANEOUS
Qty: 12 | Refills: 0 | Status: ACTIVE | COMMUNITY
Start: 2018-07-02

## 2020-10-23 RX ORDER — METHOTREXATE 25 MG/ML
1 CC INJECTION, SOLUTION INTRA-ARTERIAL; INTRAMUSCULAR; INTRAVENOUS
Qty: 2 | Refills: 5 | Status: ACTIVE | COMMUNITY

## 2020-10-23 NOTE — HPI-OTHER HISTORIES
10/23 54 yo female for 3 week TH f/u. Has oral Crohn's and I-C and stoma issues. Entyvio may not cover the EIM. Might try oral steroid lozenge --- pred paste and lozenges.  Entyvio 300 q 4 weeks Mtx 1 cc q week Pred 5 mg may stop. Bentyl -  Abd pain is ok No idea what triggered her admission Food induced -   Consider medrol dose eliza in the future  Extensive lab review CBC --- Hct 31.6/Hb 10.9 plt 120 CRP 22 ESR 56 Iron 33% Ferrintin good  10/2/20  54 yo female with  Crohn's disease recently hosp 3 days at  for abd pain, N/V and got better with 3 dasy 3 days of iv steroids.  Surgery considered.  Was on 40 mg pred - intolerant anxiety  Now dropped to 30 mg a day. Now is 50-60% better than admission.  Surgery debated gallstones. They leaned toward blockage.  Has been on Stelara. Entyvio works better than Stelara. Mtx makes her tired She would like to up the dose of lomotil.  20  55 yo female with Crohnn's disease having continued active syx. Getting Entyvio every 4 weeks. Methotrexate 12.5 mg per week Dicyclomine tablets 2 po qid  == She has taken Xifaxan and it tears her up.   Then stop it. Does not like flagyl and does not help.  Pepto 1-2 doses a day, does help the diarreha. She uses Immodium every day.  Stooling ----- empties bag 6-10 times a day. Abdominal pain - when she eats and intermittent. Energy - gone.  Labs- Anemia - Hb lower CRP is higher ---34 (was 16).   ========================== 20  Crohn's disease.     55 yo female with Crohn's disease comes in for f/u.  Dr. Jaquez is considering a GYN etiology.  Needs a pouch exam.  Getting Entyvio every 4 weeks.  Stelara did not work as well as Entyvio.  Normally when she gets an infusion, she gets a bounce and has not.  Prometheus Vedo level 3/25, just before 4 week dose was great_ __ _32.5 and no antibodies.  Feb 10 labs looked good CRP 29.9 and cbc looked fine.  ESR elevation.  2 liters of miralax_ __ _ Try 6-8am _ __ _- and then an exam at 2-3 pm She asked about a pilonidal cyst and I directed to Dr. Talamantes  Xifaxan  Try pepto bismol 1-2 prn on days.    ===== 20  55 yo female comes in at my request for 3 week f/u of Crohn's due to new 4 x eleation of CRP at 29.9.  19 CRP was similarly elevated 4x at 2.1/.5  Leonid is checking CT abd-pelvis due to pelvic pain which is not characteristic of her Crohn's.  She is wondering about GYN.  Or spastic bladder.  Dr. Talamantes thought stable but wants to scope to be sure.  Empties bag 10x a day.That is more than 3-6 months ago and has leakage of the ostomy bag. Maybe more volume.  Very watery.  Review of labs from 2/10 show cRP 29.i9 but others are stable. CBC looks good with trace elevation of wbc and borderline anemia. Getting iv iron from Arianna Jaquez. Vit D is normal at 39. Albumin is normal at 4.5.  MRI 10/1/19 showed gallstones , present in past, and she has mild pain that is intermittent Liver tests are nl. Can assess with HIDA, and/or with surgical consult, e.g ???relative need for choly, high vs. low.    ================ 20  55 yo female with severe Crohn's disease and h/o breast cancer _ _ Arianna Jaquez MD on Letrasol. 10th year.  Abdominal pain is confusing.  Has gotten pain in the ovary and in the uterus. GYN-Ann Laird MD and did US was fine. Sent to PT for pelvic treatment.  At her comment, I reviewed GB imaging 16 nl CTE 5/3/17 nl GB US Oct 2 2019 "Cholelithiasis"   o/w MRE was nl - no inflammation. BUT THE MRI DID SHOW "PERSISTENT TETHERING OF UTERUS TO RECTAL STUMP"  On Entyvio 300 mg q 4 weeks.  Next dose in 2 weeks On mtx _ _.5 cc. Monthly b12 shots  Eating ok, some weight loss. Weight stable x 6 months and down 30 lb since _ __ _- she is comfortable with current weight.  ============= 19  55 yo female with severe Crohn's and h/o breast cancer comes in for 3 month f/u.  Doing poorly.  Daily abd pain which is intermittent.  When she eats she gets pain and nausea.  Taking zofran and phenergan.  Sleep pattern is awry.  Empties bag 5 times a day and going out of rectum.  Has a loop ileostomy that may be problematic.  Allergic to tape.  Currently on Entyvio 300 mg q 8 weeks.  Stelara did not work as well.  Discussed the entyvio at shorter cycles of q 4 weeks to see if that will control syx better.  Outputs are higher and she may be volume depleted and maybe more entyvio will help.  last entyvio was last week and so a level won't be useful today.  Will just proceed with dose increase.    ============== 18 51 yo female with Crohn's disease and breast cancer and diverting ileostomy from Dr. Talamantes.  Skin issue - addressed by Dr. Best.  See Brooke Talamantes MD and enterostomal therapist.  AGWS.  No fcs.      Review of Systems  ConstitutionalDenies : body aches, fever, weight gain, weight loss  CardiovascularDenies : chest pain, heart racing/skipping, high blood pressure  RespiratoryDenies : chronic cough, shortness of breath, wheezing or asthma symptoms  GastrointestinalDenies : Abdominal Pain/discomfort, Anal/Rectal Pain or Itching, Anal Spasm, Black Stool, Bloating/belching/gaseouness, Change of Bowel Habit, Constipation, Diarrhea/Loose Stool, Difficulty in Swallowing, Heartburn/esophageal reflux, Hemorrhoids, Indigestion, Mucus in Stool, Nausea/vomiting, Rectal Bleeding, Unintentional Weight Loss    Vitals  Date Time BP Position Site L\R Cuff Size HR RR TEMP (F) WT  HT  BMI kg/m2 BSA m2 O2 Sat HC     2020 05:15 PM         133lbs 0oz 5'  8.5" 19.93 1.71          Assessment  Crohn's Disease     555.2  Diarrhea     787.91/R19.7  Cholelithiasis     574.20/K80.20  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 () - - 2020  EGD w/Biopsy if indicated (95975) - - 2020  Pouchoscopy (51053) - - 2020  CMP (comprehensive metabolic panel) (69895) - - 2020  Sed rate (52406) - - 2020  C-reactive protein (15735) - - 2020  Ferritin assay (49767) - - 2020  Iron + iron binding capacity panel ser/plas (52153, 57128) - - 2020  Vitamin B12 and folate measurement (96623, 05854) - - 2020  CBC with diff (09133) - - 2020  25-OH-Vitamin D (28024) - - 2020  GPP 22 (FilmArray) - Gastrointestinal Pathogen Panel - QDx (16759) - - 2020  MedicationsMedications have been Reconciled  Transition of Care or Provider Policy  InstructionsPatient seen today via telehealth by agreement and consent of patient in light of current COVID-19 pandemic. I used video conferencing during the visit. The patient encounter is appropriate and reasonable under the circumstances given the patient's particular presentation at this time. The patient has been advised of the followin) the potential risks and limitations of this mode of treatment (including but not limited to the absence of in-person examination); 2) the right to refuse telehealth services at any point without affecting the right to future care; 3) the right to receive in-person services, included immediately after this consultation if an urgent need arises; 4) information, including identifiable images or information from this telehealth consult, will only be shared in accordance with HIPPA regulations. Any and all of the patient's and/or patient's family member's questions on this issue have been answered. The patient has verbally consented to be treated via telehealth services. The patient has also been advised to contact this office for worsening conditions or problems, and seek emergency medical treatment and/or call 911 if the patient deems either necessary.  (For commercial payers, telehealth video only) More than half of the face-to-face time used for counseling and coordination of care.  40 min appointment (53293 EP)  I have documented a list of current medications and reviewed it with the patient.  I encouraged the patient to diet and exercise.  DispositionReturn To Clinic in 2 Months  CorrespondenceCC this document (Slade Auguste MD, Brooke Talamantes MD, Arianna Jaquez MD) - 2020    37099 exam med 32 min time  needs refill or lomotil Can't eRx lomotil.  Aciphex refill.  Needs EGD and pouchoscopy         Electronically Signed by: MD MIGUEL A Carpio

## 2020-10-27 ENCOUNTER — TELEPHONE ENCOUNTER (OUTPATIENT)
Dept: URBAN - METROPOLITAN AREA CLINIC 92 | Facility: CLINIC | Age: 55
End: 2020-10-27

## 2020-10-27 RX ORDER — METHYLPREDNISOLONE SODIUM SUCCINATE 40 MG/ML
AS DIRECTED INJECTION, POWDER, FOR SOLUTION INTRAMUSCULAR; INTRAVENOUS
Refills: 6 | OUTPATIENT
Start: 2020-10-27 | End: 2020-11-30

## 2020-11-17 ENCOUNTER — OFFICE VISIT (OUTPATIENT)
Dept: URBAN - METROPOLITAN AREA TELEHEALTH 2 | Facility: TELEHEALTH | Age: 55
End: 2020-11-17
Payer: COMMERCIAL

## 2020-11-17 ENCOUNTER — TELEPHONE ENCOUNTER (OUTPATIENT)
Dept: URBAN - METROPOLITAN AREA CLINIC 92 | Facility: CLINIC | Age: 55
End: 2020-11-17

## 2020-11-17 DIAGNOSIS — R10.84 ABDOMINAL PAIN, GENERALIZED: ICD-10-CM

## 2020-11-17 DIAGNOSIS — K50.80 CROHN'S DISEASE OF BOTH SMALL AND LARGE INTESTINE: ICD-10-CM

## 2020-11-17 DIAGNOSIS — Z79.899 IMMUNOSUPPRESSION DUE TO DRUG THERAPY: ICD-10-CM

## 2020-11-17 DIAGNOSIS — K80.20 CHOLELITHIASIS: ICD-10-CM

## 2020-11-17 DIAGNOSIS — R19.7 DIARRHEA: ICD-10-CM

## 2020-11-17 PROCEDURE — 99214 OFFICE O/P EST MOD 30 MIN: CPT | Performed by: INTERNAL MEDICINE

## 2020-11-17 RX ORDER — IBUPROFEN 200 MG/1
TAKE 1 CAPSULE (200 MG) BY ORAL ROUTE EVERY 6 HOURS AS NEEDED CAPSULE, LIQUID FILLED ORAL
Qty: 0 | Refills: 0 | Status: ACTIVE | COMMUNITY
Start: 1900-01-01

## 2020-11-17 RX ORDER — DIPHENOXYLATE HCL/ATROPINE 2.5-.025MG
TAKE 2 TABLETS (5 MG) BY ORAL ROUTE 2 TIMES PER DAY FOR 90 DAYS TABLET ORAL 2
Qty: 360 | Refills: 0 | Status: ACTIVE | COMMUNITY
Start: 1900-01-01

## 2020-11-17 RX ORDER — RABEPRAZOLE SODIUM 20 MG/1
TAKE 1 TABLET BY MOUTH TWICE DAILY TABLET, DELAYED RELEASE ORAL
Qty: 180 | Refills: 3 | Status: ACTIVE | COMMUNITY
Start: 2020-02-26

## 2020-11-17 RX ORDER — CYANOCOBALAMIN 1000 UG/ML
INJECT 1ML INTRAMUSCULARLY ONCE EVERY MONTH INJECTION INTRAMUSCULAR; SUBCUTANEOUS
Qty: 12 | Refills: 0 | Status: ACTIVE | COMMUNITY
Start: 2018-07-02

## 2020-11-17 RX ORDER — PROMETHAZINE HYDROCHLORIDE 25 MG/1
1 TABLET AS NEEDED TABLET ORAL BID
Qty: 60 TABLET | Refills: 5 | Status: ACTIVE | COMMUNITY
Start: 2020-10-02 | End: 2021-03-31

## 2020-11-17 RX ORDER — PREDNISONE 10 MG/1
TAKE 40 MG DAILY TABLET ORAL ONCE A DAY
Status: ACTIVE | COMMUNITY

## 2020-11-17 RX ORDER — VEDOLIZUMAB 300 MG/5ML
INFUSE 300 MG OVER 30 MINUTE(S) BY INTRAVENOUS ROUTE EVERY 4 WEEKS INJECTION, POWDER, LYOPHILIZED, FOR SOLUTION INTRAVENOUS
Qty: 1 | Refills: 11 | Status: ACTIVE | COMMUNITY
Start: 1900-01-01

## 2020-11-17 RX ORDER — METHOTREXATE SODIUM 25 MG/ML
INJECT 0.5 ML  BY SUBCUTANEOUS ROUTE ONCE WEEKLY INJECTION, SOLUTION INTRA-ARTERIAL; INTRAMUSCULAR; INTRAVENOUS
Qty: 1 | Refills: 0 | Status: ACTIVE | COMMUNITY
Start: 1900-01-01

## 2020-11-17 RX ORDER — LETROZOLE TABLETS 2.5 MG/1
TAKE 1 TABLET (2.5 MG) BY ORAL ROUTE ONCE DAILY TABLET, FILM COATED ORAL 1
Qty: 0 | Refills: 0 | Status: ACTIVE | COMMUNITY
Start: 1900-01-01

## 2020-11-17 RX ORDER — ALENDRONATE SODIUM 70 MG
TABLET ORAL
Qty: 0 | Refills: 0 | Status: ACTIVE | COMMUNITY
Start: 1900-01-01

## 2020-11-17 RX ORDER — RABEPRAZOLE SODIUM 20 MG/1
TAKE 1 TABLET BY MOUTH TWICE DAILY TABLET, DELAYED RELEASE ORAL
Qty: 180 | Refills: 3 | Status: ACTIVE | COMMUNITY
Start: 2019-02-17

## 2020-11-17 RX ORDER — METHOTREXATE 25 MG/ML
1 CC INJECTION, SOLUTION INTRA-ARTERIAL; INTRAMUSCULAR; INTRAVENOUS
Qty: 2 | Refills: 5 | Status: ACTIVE | COMMUNITY

## 2020-11-17 RX ORDER — IBUPROFEN 100 MG/1
TAKE 2 TABLETS (200 MG) BY ORAL ROUTE EVERY 6 HOURS AS NEEDED WITH FOOD TABLET, COATED ORAL
Qty: 0 | Refills: 0 | Status: ACTIVE | COMMUNITY
Start: 1900-01-01

## 2020-11-17 RX ORDER — METHYLPREDNISOLONE SODIUM SUCCINATE 40 MG/ML
AS DIRECTED INJECTION, POWDER, FOR SOLUTION INTRAMUSCULAR; INTRAVENOUS
Refills: 6 | Status: ACTIVE | COMMUNITY
Start: 2020-10-27 | End: 2020-11-30

## 2020-11-17 NOTE — HPI-OTHER HISTORIES
54 yo female for 3 week TH f/u. Has upper abdominal pain that may be due to gallstones. Had referred her to Dr. Moreland but she did not call. She heard he was retiring and not. Has abdominal pain when she. Left upper quadrant or "upper rib cage" mostly left. He knows Dr. Talamantes. -  Frustrated---- blisters on lip. Torn open. Has not tried steroid rinse or paste in past. Magic  mouth wash helps. Does not reduce the blisters. Dr. Best- non steroid options for mouth   ======================== 10/23/20  10/23 54 yo female for 3 week TH f/u. Has oral Crohn's and I-C and stoma issues. Entyvio may not cover the EIM. Might try oral steroid lozenge --- pred paste and lozenges.  Entyvio 300 q 4 weeks Mtx 1 cc q week Pred 5 mg may stop. Bentyl -  Abd pain is ok No idea what triggered her admission Food induced -   Consider medrol dose eliza in the future  Extensive lab review CBC --- Hct 31.6/Hb 10.9 plt 120 CRP 22 ESR 56 Iron 33% Ferrintin good ================================================== 10/2/20  54 yo female with  Crohn's disease recently hosp 3 days at  for abd pain, N/V and got better with 3 dasy 3 days of iv steroids.  Surgery considered.  Was on 40 mg pred - intolerant anxiety  Now dropped to 30 mg a day. Now is 50-60% better than admission.  Surgery debated gallstones. They leaned toward blockage.  Has been on Stelara. Entyvio works better than Stelara. Mtx makes her tired She would like to up the dose of lomotil.  20  53 yo female with Crohnn's disease having continued active syx. Getting Entyvio every 4 weeks. Methotrexate 12.5 mg per week Dicyclomine tablets 2 po qid  == She has taken Xifaxan and it tears her up.   Then stop it. Does not like flagyl and does not help.  Pepto 1-2 doses a day, does help the diarreha. She uses Immodium every day.  Stooling ----- empties bag 6-10 times a day. Abdominal pain - when she eats and intermittent. Energy - gone.  Labs- Anemia - Hb lower CRP is higher ---34 (was 16).   ========================== 20  Crohn's disease.     53 yo female with Crohn's disease comes in for f/u.  Dr. Jaquez is considering a GYN etiology.  Needs a pouch exam.  Getting Entyvio every 4 weeks.  Stelara did not work as well as Entyvio.  Normally when she gets an infusion, she gets a bounce and has not.  Prometheus Vedo level 3/25, just before 4 week dose was great_ __ _32.5 and no antibodies.  Feb 10 labs looked good CRP 29.9 and cbc looked fine.  ESR elevation.  2 liters of miralax_ __ _ Try 6-8am _ __ _- and then an exam at 2-3 pm She asked about a pilonidal cyst and I directed to Dr. Albin Owen  Try pepto bismol 1-2 prn on days.    ===== 20  53 yo female comes in at my request for 3 week f/u of Crohn's due to new 4 x eleation of CRP at 29.9.  19 CRP was similarly elevated 4x at 2.1/.5  Leonid is checking CT abd-pelvis due to pelvic pain which is not characteristic of her Crohn's.  She is wondering about GYN.  Or spastic bladder.  Dr. Talamantes thought stable but wants to scope to be sure.  Empties bag 10x a day.That is more than 3-6 months ago and has leakage of the ostomy bag. Maybe more volume.  Very watery.  Review of labs from 2/10 show cRP 29.i9 but others are stable. CBC looks good with trace elevation of wbc and borderline anemia. Getting iv iron from Arianna Jaquez. Vit D is normal at 39. Albumin is normal at 4.5.  MRI 10/1/19 showed gallstones , present in past, and she has mild pain that is intermittent Liver tests are nl. Can assess with HIDA, and/or with surgical consult, e.g ???relative need for choly, high vs. low.    ================ 20  53 yo female with severe Crohn's disease and h/o breast cancer _ _ Arianna Jaquez MD on Letrasol. 10th year.  Abdominal pain is confusing.  Has gotten pain in the ovary and in the uterus. GYN-Ann Laird MD and did US was fine. Sent to PT for pelvic treatment.  At her comment, I reviewed GB imaging 16 nl CTE 5/3/17 nl GB US Oct 2 2019 "Cholelithiasis"   o/w MRE was nl - no inflammation. BUT THE MRI DID SHOW "PERSISTENT TETHERING OF UTERUS TO RECTAL STUMP"  On Entyvio 300 mg q 4 weeks.  Next dose in 2 weeks On mtx _ _.5 cc. Monthly b12 shots  Eating ok, some weight loss. Weight stable x 6 months and down 30 lb since _ __ _- she is comfortable with current weight.  ============= 19  53 yo female with severe Crohn's and h/o breast cancer comes in for 3 month f/u.  Doing poorly.  Daily abd pain which is intermittent.  When she eats she gets pain and nausea.  Taking zofran and phenergan.  Sleep pattern is awry.  Empties bag 5 times a day and going out of rectum.  Has a loop ileostomy that may be problematic.  Allergic to tape.  Currently on Entyvio 300 mg q 8 weeks.  Stelara did not work as well.  Discussed the entyvio at shorter cycles of q 4 weeks to see if that will control syx better.  Outputs are higher and she may be volume depleted and maybe more entyvio will help.  last entyvio was last week and so a level won't be useful today.  Will just proceed with dose increase.    ============== 18 51 yo female with Crohn's disease and breast cancer and diverting ileostomy from Dr. Talamantes.  Skin issue - addressed by Dr. Best.  See Brooke Talamantes MD and enterostomal therapist.  AGWS.  No fcs.      Review of Systems  ConstitutionalDenies : body aches, fever, weight gain, weight loss  CardiovascularDenies : chest pain, heart racing/skipping, high blood pressure  RespiratoryDenies : chronic cough, shortness of breath, wheezing or asthma symptoms  GastrointestinalDenies : Abdominal Pain/discomfort, Anal/Rectal Pain or Itching, Anal Spasm, Black Stool, Bloating/belching/gaseouness, Change of Bowel Habit, Constipation, Diarrhea/Loose Stool, Difficulty in Swallowing, Heartburn/esophageal reflux, Hemorrhoids, Indigestion, Mucus in Stool, Nausea/vomiting, Rectal Bleeding, Unintentional Weight Loss    Vitals  Date Time BP Position Site L\R Cuff Size HR RR TEMP (F) WT  HT  BMI kg/m2 BSA m2 O2 Sat HC     2020 05:15 PM         133lbs 0oz 5'  8.5" 19.93 1.71          Assessment  Crohn's Disease     555.2  Diarrhea     787.91/R19.7  Cholelithiasis     574.20/K80.20  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 () - - 2020  EGD w/Biopsy if indicated (64063) - - 2020  Pouchoscopy (45010) - - 2020  CMP (comprehensive metabolic panel) (49721) - - 2020  Sed rate (38612) - - 2020  C-reactive protein (48236) - - 2020  Ferritin assay (57800) - - 2020  Iron + iron binding capacity panel ser/plas (90393, 50709) - - 2020  Vitamin B12 and folate measurement (16003, 34528) - - 2020  CBC with diff (64656) - - 2020  25-OH-Vitamin D (35560) - - 2020  GPP 22 (FilmArray) - Gastrointestinal Pathogen Panel - QDx (37753) - - 2020  MedicationsMedications have been Reconciled  Transition of Care or Provider Policy  InstructionsPatient seen today via telehealth by agreement and consent of patient in light of current COVID-19 pandemic. I used video conferencing during the visit. The patient encounter is appropriate and reasonable under the circumstances given the patient's particular presentation at this time. The patient has been advised of the followin) the potential risks and limitations of this mode of treatment (including but not limited to the absence of in-person examination); 2) the right to refuse telehealth services at any point without affecting the right to future care; 3) the right to receive in-person services, included immediately after this consultation if an urgent need arises; 4) information, including identifiable images or information from this telehealth consult, will only be shared in accordance with HIPPA regulations. Any and all of the patient's and/or patient's family member's questions on this issue have been answered. The patient has verbally consented to be treated via telehealth services. The patient has also been advised to contact this office for worsening conditions or problems, and seek emergency medical treatment and/or call 911 if the patient deems either necessary.  (For commercial payers, telehealth video only) More than half of the face-to-face time used for counseling and coordination of care.  40 min appointment (75213 EP)  I have documented a list of current medications and reviewed it with the patient.  I encouraged the patient to diet and exercise.  DispositionReturn To Clinic in 2 Months  CorrespondenceCC this document (Slade Auguste MD, Brooke Talamantes MD, Arianna Jaquez MD) - 2020    96773 exam med 32 min time  needs refill or lomotil Can't eRx lomotil.  Aciphex refill.  Needs EGD and pouchoscopy         Electronically Signed by: MD MIGUEL A Carpio

## 2021-01-29 ENCOUNTER — LAB OUTSIDE AN ENCOUNTER (OUTPATIENT)
Dept: URBAN - METROPOLITAN AREA CLINIC 98 | Facility: CLINIC | Age: 56
End: 2021-01-29

## 2021-02-02 ENCOUNTER — LAB OUTSIDE AN ENCOUNTER (OUTPATIENT)
Dept: URBAN - METROPOLITAN AREA CLINIC 98 | Facility: CLINIC | Age: 56
End: 2021-02-02

## 2021-02-02 LAB
ABSOLUTE BASOPHIL COUNT: 0.03
ABSOLUTE EOSINOPHIL COUNT: 0.14
ABSOLUTE IMMATURE GRANULOCYTE  COUNT: 0.04
ABSOLUTE LYMPHOCYTE COUNT: 1.09
ABSOLUTE MONOCYTE COUNT: 0.14
ABSOLUTE NEUTROPHIL COUNT (ANC): 4.95
ABSOLUTE NRBC  COUNT: 0
AG RATIO: 1
ALBUMIN LEVEL: 4.5
ALK PHOS: 91
ALT: 31
ANION GAP: 10
AST: 28
BASOPHIL AUTO: 0
BILIRUBIN TOTAL: 0.3
BUN/CREAT RATIO: 17
BUN: 13
CALCIUM LEVEL: 9.5
CHLORIDE LEVEL: 107
CO2 LEVEL: 21
CREATININE LEVEL: 0.8
EOS AUTO: 2
GFR AFRICAN AMERICAN: >60
GFR NON AFRICAN AMERICAN: >60
GLUCOSE LEVEL: 95
HCT: 34.2
HGB: 11.5
IMMATURE GRANULOCYTES AUTO: 0.6
LYMPH AUTO: 17
MCH: 38.5
MCHC: 33.6
MCV: 114.4
MONO AUTO: 2
MPV: 10.2
NEUTRO AUTO: 77
NRBC AUTO: 0
OSMO (CALC): 276
PERFORMING LAB: (no result)
PLATELETS: 133
POTASSIUM LEVEL: 4.4
PROTEIN TOTAL: 8.1
RBC: 2.99
RDW: 13.1
SLIDE REVIEW: (no result)
SODIUM LEVEL: 138
WBC: 6.4

## 2021-02-03 ENCOUNTER — TELEPHONE ENCOUNTER (OUTPATIENT)
Dept: URBAN - METROPOLITAN AREA CLINIC 92 | Facility: CLINIC | Age: 56
End: 2021-02-03

## 2021-02-11 LAB
A/G RATIO: 1.4
ALBUMIN: 4.1
ALKALINE PHOSPHATASE: 89
ALT (SGPT): 19
ANTI-VEDOLIZUMAB ANTIBODY: <25
AST (SGOT): 23
BASO (ABSOLUTE): 0
BASOS: 1
BILIRUBIN, TOTAL: <0.2
BUN/CREATININE RATIO: 21
BUN: 17
C-REACTIVE PROTEIN, QUANT: 11
CALCIUM: 9.5
CARBON DIOXIDE, TOTAL: 18
CHLORIDE: 103
CREATININE: 0.82
EGFR IF AFRICN AM: 94
EGFR IF NONAFRICN AM: 81
EOS (ABSOLUTE): 0.1
EOS: 2
GLOBULIN, TOTAL: 2.9
GLUCOSE: 106
HEMATOCRIT: 31.3
HEMATOLOGY COMMENTS:: (no result)
HEMOGLOBIN: 10.5
IMMATURE CELLS: (no result)
IMMATURE GRANS (ABS): 0
IMMATURE GRANULOCYTES: 0
LYMPHS (ABSOLUTE): 1.1
LYMPHS: 15
MCH: 37.5
MCHC: 33.5
MCV: 112
MONOCYTES(ABSOLUTE): 0.2
MONOCYTES: 3
NEUTROPHILS (ABSOLUTE): 5.7
NEUTROPHILS: 79
NRBC: (no result)
PLATELETS: 232
POTASSIUM: 4.6
PROTEIN, TOTAL: 7
RBC: 2.8
RDW: 13
SEDIMENTATION RATE-WESTERGREN: 47
SODIUM: 137
VEDOLIZUMAB: 25
WBC: 7.2

## 2021-02-12 ENCOUNTER — TELEPHONE ENCOUNTER (OUTPATIENT)
Dept: URBAN - METROPOLITAN AREA CLINIC 92 | Facility: CLINIC | Age: 56
End: 2021-02-12

## 2021-02-12 ENCOUNTER — TELEPHONE ENCOUNTER (OUTPATIENT)
Dept: URBAN - METROPOLITAN AREA CLINIC 98 | Facility: CLINIC | Age: 56
End: 2021-02-12

## 2021-02-12 RX ORDER — PROMETHAZINE HYDROCHLORIDE 25 MG/1
TAKE 1 TABLETS TABLET ORAL BID
Qty: 60 TABLETS | Refills: 5 | OUTPATIENT
Start: 2021-02-12 | End: 2021-08-11

## 2021-02-12 RX ORDER — PROMETHAZINE HYDROCHLORIDE 25 MG/1
TAKE 1 TABLETS TABLET ORAL BID
Qty: 60 TABLETS | Refills: 5
Start: 2021-02-12

## 2021-03-06 ENCOUNTER — ERX REFILL RESPONSE (OUTPATIENT)
Dept: URBAN - METROPOLITAN AREA CLINIC 98 | Facility: CLINIC | Age: 56
End: 2021-03-06

## 2021-03-06 RX ORDER — RABEPRAZOLE SODIUM 20 MG/1
TAKE 1 TABLET BY MOUTH TWICE DAILY TABLET, DELAYED RELEASE ORAL
Qty: 180 | Refills: 3

## 2021-04-07 ENCOUNTER — TELEPHONE ENCOUNTER (OUTPATIENT)
Dept: URBAN - METROPOLITAN AREA CLINIC 98 | Facility: CLINIC | Age: 56
End: 2021-04-07

## 2021-04-07 ENCOUNTER — OFFICE VISIT (OUTPATIENT)
Dept: URBAN - METROPOLITAN AREA CLINIC 98 | Facility: CLINIC | Age: 56
End: 2021-04-07
Payer: COMMERCIAL

## 2021-04-07 ENCOUNTER — TELEPHONE ENCOUNTER (OUTPATIENT)
Dept: URBAN - METROPOLITAN AREA CLINIC 92 | Facility: CLINIC | Age: 56
End: 2021-04-07

## 2021-04-07 DIAGNOSIS — K50.80 CROHN'S COLITIS: ICD-10-CM

## 2021-04-07 DIAGNOSIS — R10.84 ABDOMINAL PAIN, GENERALIZED: ICD-10-CM

## 2021-04-07 DIAGNOSIS — K80.20 CHOLELITHIASIS: ICD-10-CM

## 2021-04-07 DIAGNOSIS — R19.7 DIARRHEA: ICD-10-CM

## 2021-04-07 PROCEDURE — 99214 OFFICE O/P EST MOD 30 MIN: CPT | Performed by: INTERNAL MEDICINE

## 2021-04-07 RX ORDER — PREDNISONE 10 MG/1
TAKE 40 MG DAILY TABLET ORAL ONCE A DAY
Status: ACTIVE | COMMUNITY

## 2021-04-07 RX ORDER — IBUPROFEN 200 MG/1
TAKE 1 CAPSULE (200 MG) BY ORAL ROUTE EVERY 6 HOURS AS NEEDED CAPSULE, LIQUID FILLED ORAL
Qty: 0 | Refills: 0 | Status: ACTIVE | COMMUNITY
Start: 1900-01-01

## 2021-04-07 RX ORDER — CYANOCOBALAMIN 1000 UG/ML
INJECT 1ML INTRAMUSCULARLY ONCE EVERY MONTH INJECTION INTRAMUSCULAR; SUBCUTANEOUS
Qty: 12 | Refills: 0 | Status: ACTIVE | COMMUNITY
Start: 2018-07-02

## 2021-04-07 RX ORDER — DIPHENOXYLATE HCL/ATROPINE 2.5-.025MG
TAKE 2 TABLETS (5 MG) BY ORAL ROUTE 2 TIMES PER DAY FOR 90 DAYS TABLET ORAL 2
Qty: 360 | Refills: 0 | Status: ACTIVE | COMMUNITY
Start: 1900-01-01

## 2021-04-07 RX ORDER — METHOTREXATE 25 MG/ML
1 CC INJECTION, SOLUTION INTRA-ARTERIAL; INTRAMUSCULAR; INTRAVENOUS
Qty: 2 | Refills: 5 | Status: ACTIVE | COMMUNITY

## 2021-04-07 RX ORDER — LETROZOLE TABLETS 2.5 MG/1
TAKE 1 TABLET (2.5 MG) BY ORAL ROUTE ONCE DAILY TABLET, FILM COATED ORAL 1
Qty: 0 | Refills: 0 | Status: ACTIVE | COMMUNITY
Start: 1900-01-01

## 2021-04-07 RX ORDER — PREDNISONE 5 MG/1
1 TABLET TABLET ORAL ONCE A DAY
Qty: 30 TABLETS | Refills: 1 | OUTPATIENT
Start: 2021-04-07 | End: 2021-06-06

## 2021-04-07 RX ORDER — PROMETHAZINE HYDROCHLORIDE 25 MG/1
TAKE 1 TABLETS TABLET ORAL BID
Qty: 60 TABLETS | Refills: 5 | Status: ACTIVE | COMMUNITY
Start: 2021-02-12

## 2021-04-07 RX ORDER — RABEPRAZOLE SODIUM 20 MG/1
TAKE 1 TABLET BY MOUTH TWICE DAILY TABLET, DELAYED RELEASE ORAL
Qty: 180 | Refills: 3 | Status: ACTIVE | COMMUNITY

## 2021-04-07 RX ORDER — ALENDRONATE SODIUM 70 MG
TABLET ORAL
Qty: 0 | Refills: 0 | Status: ACTIVE | COMMUNITY
Start: 1900-01-01

## 2021-04-07 RX ORDER — METHOTREXATE SODIUM 25 MG/ML
INJECT 0.5 ML  BY SUBCUTANEOUS ROUTE ONCE WEEKLY INJECTION, SOLUTION INTRA-ARTERIAL; INTRAMUSCULAR; INTRAVENOUS
Qty: 1 | Refills: 0 | Status: ACTIVE | COMMUNITY
Start: 1900-01-01

## 2021-04-07 RX ORDER — RABEPRAZOLE SODIUM 20 MG/1
TAKE 1 TABLET BY MOUTH TWICE DAILY TABLET, DELAYED RELEASE ORAL
Qty: 180 | Refills: 3 | Status: ACTIVE | COMMUNITY
Start: 2019-02-17

## 2021-04-07 RX ORDER — IBUPROFEN 100 MG/1
TAKE 2 TABLETS (200 MG) BY ORAL ROUTE EVERY 6 HOURS AS NEEDED WITH FOOD TABLET, COATED ORAL
Qty: 0 | Refills: 0 | Status: ACTIVE | COMMUNITY
Start: 1900-01-01

## 2021-04-07 RX ORDER — VEDOLIZUMAB 300 MG/5ML
INFUSE 300 MG OVER 30 MINUTE(S) BY INTRAVENOUS ROUTE EVERY 4 WEEKS INJECTION, POWDER, LYOPHILIZED, FOR SOLUTION INTRAVENOUS
Qty: 1 | Refills: 11 | Status: ACTIVE | COMMUNITY
Start: 1900-01-01

## 2021-04-07 NOTE — HPI-TODAY'S VISIT:
54 yo female with CD and ostomy for 5 month f/u.  Abd is good. Ostomy is functioning. Sleep thru much of night.  Appetite and intake is better. Oral CD problematic. Has been in touch with Dr. Pepe ---- swish and spit.  Max-- 150. Current weight is 121.  Not taking supplements --  Boost and Ensure. 2-3 cans They don't agree with her. Lactose free ice  She wants low dose prednisone. Lets try 2.5-5 mg a day.  ================== 20  54 yo female for 3 week TH f/u. Has upper abdominal pain that may be due to gallstones. Had referred her to Dr. Moreland but she did not call. She heard he was retiring and not. Has abdominal pain when she. Left upper quadrant or "upper rib cage" mostly left. He knows Dr. Talamantes. -  Frustrated---- blisters on lip. Torn open. Has not tried steroid rinse or paste in past. Magic  mouth wash helps. Does not reduce the blisters. Dr. Best- non steroid options for mouth   ======================== 10/23/20  10/23 54 yo female for 3 week  f/u. Has oral Crohn's and I-C and stoma issues. Entyvio may not cover the EIM. Might try oral steroid lozenge --- pred paste and lozenges.  Entyvio 300 q 4 weeks Mtx 1 cc q week Pred 5 mg may stop. Bentyl -  Abd pain is ok No idea what triggered her admission Food induced -   Consider medrol dose eliza in the future  Extensive lab review CBC --- Hct 31.6/Hb 10.9 plt 120 CRP 22 ESR 56 Iron 33% Ferrintin good ================================================== 10/2/20  54 yo female with  Crohn's disease recently hosp 3 days at  for abd pain, N/V and got better with 3 dasy 3 days of iv steroids.  Surgery considered.  Was on 40 mg pred - intolerant anxiety  Now dropped to 30 mg a day. Now is 50-60% better than admission.  Surgery debated gallstones. They leaned toward blockage.  Has been on Stelara. Entyvio works better than Stelara. Mtx makes her tired She would like to up the dose of lomotil.  20  55 yo female with Crohnn's disease having continued active syx. Getting Entyvio every 4 weeks. Methotrexate 12.5 mg per week Dicyclomine tablets 2 po qid  == She has taken Xifaxan and it tears her up.   Then stop it. Does not like flagyl and does not help.  Pepto 1-2 doses a day, does help the diarreha. She uses Immodium every day.  Stooling ----- empties bag 6-10 times a day. Abdominal pain - when she eats and intermittent. Energy - gone.  Labs- Anemia - Hb lower CRP is higher ---34 (was 16).   ========================== 20  Crohn's disease.     55 yo female with Crohn's disease comes in for f/u.  Dr. Jaquez is considering a GYN etiology.  Needs a pouch exam.  Getting Entyvio every 4 weeks.  Stelara did not work as well as Entyvio.  Normally when she gets an infusion, she gets a bounce and has not.  Prometheus Vedo level 3/25, just before 4 week dose was great_ __ _32.5 and no antibodies.  Feb 10 labs looked good CRP 29.9 and cbc looked fine.  ESR elevation.  2 liters of miralax_ __ _ Try 6-8am _ __ _- and then an exam at 2-3 pm She asked about a pilonidal cyst and I directed to Dr. Albin Owen  Try pepto bismol 1-2 prn on days.    ===== 20  55 yo female comes in at my request for 3 week f/u of Crohn's due to new 4 x eleation of CRP at 29.9.  19 CRP was similarly elevated 4x at 2.1/.5  Leonid is checking CT abd-pelvis due to pelvic pain which is not characteristic of her Crohn's.  She is wondering about GYN.  Or spastic bladder.  Dr. Talamantes thought stable but wants to scope to be sure.  Empties bag 10x a day.That is more than 3-6 months ago and has leakage of the ostomy bag. Maybe more volume.  Very watery.  Review of labs from 2/10 show cRP 29.i9 but others are stable. CBC looks good with trace elevation of wbc and borderline anemia. Getting iv iron from Arianna Jaquez. Vit D is normal at 39. Albumin is normal at 4.5.  MRI 10/1/19 showed gallstones , present in past, and she has mild pain that is intermittent Liver tests are nl. Can assess with HIDA, and/or with surgical consult, e.g ???relative need for choly, high vs. low.    ================ 20  55 yo female with severe Crohn's disease and h/o breast cancer _ _ Arianna Jaquez MD on Letrasol. 10th year.  Abdominal pain is confusing.  Has gotten pain in the ovary and in the uterus. GYN-Ann Laird MD and did US was fine. Sent to PT for pelvic treatment.  At her comment, I reviewed GB imaging 16 nl CTE 5/3/17 nl GB US Oct 2 2019 "Cholelithiasis"   o/w MRE was nl - no inflammation. BUT THE MRI DID SHOW "PERSISTENT TETHERING OF UTERUS TO RECTAL STUMP"  On Entyvio 300 mg q 4 weeks.  Next dose in 2 weeks On mtx _ _.5 cc. Monthly b12 shots  Eating ok, some weight loss. Weight stable x 6 months and down 30 lb since _ __ _- she is comfortable with current weight.  ============= 19  55 yo female with severe Crohn's and h/o breast cancer comes in for 3 month f/u.  Doing poorly.  Daily abd pain which is intermittent.  When she eats she gets pain and nausea.  Taking zofran and phenergan.  Sleep pattern is awry.  Empties bag 5 times a day and going out of rectum.  Has a loop ileostomy that may be problematic.  Allergic to tape.  Currently on Entyvio 300 mg q 8 weeks.  Stelara did not work as well.  Discussed the entyvio at shorter cycles of q 4 weeks to see if that will control syx better.  Outputs are higher and she may be volume depleted and maybe more entyvio will help.  last entyvio was last week and so a level won't be useful today.  Will just proceed with dose increase.    ============== 18 51 yo female with Crohn's disease and breast cancer and diverting ileostomy from Dr. Talamantes.  Skin issue - addressed by Dr. Best.  See Brooke Talamantes MD and enterostomal therapist.  AGWS.  No fcs.      Review of Systems  ConstitutionalDenies : body aches, fever, weight gain, weight loss  CardiovascularDenies : chest pain, heart racing/skipping, high blood pressure  RespiratoryDenies : chronic cough, shortness of breath, wheezing or asthma symptoms  GastrointestinalDenies : Abdominal Pain/discomfort, Anal/Rectal Pain or Itching, Anal Spasm, Black Stool, Bloating/belching/gaseouness, Change of Bowel Habit, Constipation, Diarrhea/Loose Stool, Difficulty in Swallowing, Heartburn/esophageal reflux, Hemorrhoids, Indigestion, Mucus in Stool, Nausea/vomiting, Rectal Bleeding, Unintentional Weight Loss    Vitals  Date Time BP Position Site L\R Cuff Size HR RR TEMP (F) WT  HT  BMI kg/m2 BSA m2 O2 Sat HC     2020 05:15 PM         133lbs 0oz 5'  8.5" 19.93 1.71          Assessment  Crohn's Disease     555.2  Diarrhea     787.91/R19.7  Cholelithiasis     574.20/K80.20  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 () - - 2020  EGD w/Biopsy if indicated (75249) - - 2020  Pouchoscopy (70978) - - 2020  CMP (comprehensive metabolic panel) (93772) - - 2020  Sed rate (72702) - - 2020  C-reactive protein (72970) - - 2020  Ferritin assay (69806) - - 2020  Iron + iron binding capacity panel ser/plas (01367, 62866) - - 2020  Vitamin B12 and folate measurement (55190, 54949) - - 2020  CBC with diff (06121) - - 2020  25-OH-Vitamin D (94324) - - 2020  GPP 22 (FilmArray) - Gastrointestinal Pathogen Panel - QDx (49209) - - 2020  MedicationsMedications have been Reconciled  Transition of Care or Provider Policy  InstructionsPatient seen today via telehealth by agreement and consent of patient in light of current COVID-19 pandemic. I used video conferencing during the visit. The patient encounter is appropriate and reasonable under the circumstances given the patient's particular presentation at this time. The patient has been advised of the followin) the potential risks and limitations of this mode of treatment (including but not limited to the absence of in-person examination); 2) the right to refuse telehealth services at any point without affecting the right to future care; 3) the right to receive in-person services, included immediately after this consultation if an urgent need arises; 4) information, including identifiable images or information from this telehealth consult, will only be shared in accordance with HIPPA regulations. Any and all of the patient's and/or patient's family member's questions on this issue have been answered. The patient has verbally consented to be treated via telehealth services. The patient has also been advised to contact this office for worsening conditions or problems, and seek emergency medical treatment and/or call 911 if the patient deems either necessary.  (For commercial payers, telehealth video only) More than half of the face-to-face time used for counseling and coordination of care.  40 min appointment (24767 EP)  I have documented a list of current medications and reviewed it with the patient.  I encouraged the patient to diet and exercise.  DispositionReturn To Clinic in 2 Months  CorrespondenceCC this document (Slade Auguste MD, Brooke Talamantes MD, Arianna Jaquez MD) - 2020    90892 exam med 32 min time  needs refill or lomotil Can't eRx lomotil.  Aciphex refill.  Needs EGD and pouchoscopy         Electronically Signed by: Sánchez Perez MD -A

## 2021-04-16 ENCOUNTER — TELEPHONE ENCOUNTER (OUTPATIENT)
Dept: URBAN - METROPOLITAN AREA CLINIC 98 | Facility: CLINIC | Age: 56
End: 2021-04-16

## 2021-04-19 ENCOUNTER — LAB OUTSIDE AN ENCOUNTER (OUTPATIENT)
Dept: URBAN - METROPOLITAN AREA CLINIC 98 | Facility: CLINIC | Age: 56
End: 2021-04-19

## 2021-04-20 LAB
A/G RATIO: 1.8
ALBUMIN: 4.2
ALKALINE PHOSPHATASE: 83
ALT (SGPT): 13
AST (SGOT): 13
BASO (ABSOLUTE): 0
BASOS: 1
BILIRUBIN, TOTAL: 0.3
BUN/CREATININE RATIO: 23
BUN: 16
C-REACTIVE PROTEIN, QUANT: 7
CALCIUM: 9.2
CARBON DIOXIDE, TOTAL: 22
CHLORIDE: 105
CREATININE: 0.71
EGFR IF AFRICN AM: 112
EGFR IF NONAFRICN AM: 97
EOS (ABSOLUTE): 0.1
EOS: 1
FERRITIN, SERUM: 1099
FOLATE (FOLIC ACID), SERUM: >20
GLOBULIN, TOTAL: 2.4
GLUCOSE: 98
HEMATOCRIT: 29.9
HEMATOLOGY COMMENTS:: (no result)
HEMOGLOBIN: 10.1
IMMATURE CELLS: (no result)
IMMATURE GRANS (ABS): 0
IMMATURE GRANULOCYTES: 1
IRON BIND.CAP.(TIBC): 229
IRON SATURATION: 69
IRON: 158
LYMPHS (ABSOLUTE): 0.5
LYMPHS: 9
MCH: 39.8
MCHC: 33.8
MCV: 118
MONOCYTES(ABSOLUTE): 0.2
MONOCYTES: 3
NEUTROPHILS (ABSOLUTE): 4.5
NEUTROPHILS: 85
NRBC: (no result)
PLATELETS: 176
POTASSIUM: 4
PROTEIN, TOTAL: 6.6
RBC: 2.54
RDW: 14.5
SEDIMENTATION RATE-WESTERGREN: 10
SODIUM: 139
UIBC: 71
VITAMIN B12: 915
WBC: 5.3

## 2021-04-21 LAB
QUANTIFERON CRITERIA: (no result)
QUANTIFERON INCUBATION: (no result)
QUANTIFERON MITOGEN VALUE: 2.98
QUANTIFERON NIL VALUE: 0
QUANTIFERON TB1 AG VALUE: 0
QUANTIFERON TB2 AG VALUE: 0
QUANTIFERON-TB GOLD PLUS: NEGATIVE

## 2021-04-22 ENCOUNTER — TELEPHONE ENCOUNTER (OUTPATIENT)
Dept: URBAN - METROPOLITAN AREA CLINIC 98 | Facility: CLINIC | Age: 56
End: 2021-04-22

## 2021-04-22 RX ORDER — METHYLPREDNISOLONE 4 MG/1
AS DIRECTED TABLET ORAL ONCE DAILY
Qty: 21 TABLETS | Refills: 1 | OUTPATIENT
Start: 2021-04-22 | End: 2021-05-04

## 2021-04-30 ENCOUNTER — OFFICE VISIT (OUTPATIENT)
Dept: URBAN - METROPOLITAN AREA CLINIC 98 | Facility: CLINIC | Age: 56
End: 2021-04-30
Payer: COMMERCIAL

## 2021-04-30 DIAGNOSIS — K80.20 CHOLELITHIASIS: ICD-10-CM

## 2021-04-30 DIAGNOSIS — Z79.899 IMMUNOSUPPRESSION DUE TO DRUG THERAPY: ICD-10-CM

## 2021-04-30 DIAGNOSIS — R19.7 DIARRHEA: ICD-10-CM

## 2021-04-30 DIAGNOSIS — R10.84 ABDOMINAL PAIN, GENERALIZED: ICD-10-CM

## 2021-04-30 DIAGNOSIS — K50.90 CROHN DISEASE: ICD-10-CM

## 2021-04-30 DIAGNOSIS — K50.80 CROHN'S DISEASE OF BOTH SMALL AND LARGE INTESTINE WITHOUT COMPLICATIONS: ICD-10-CM

## 2021-04-30 PROCEDURE — 99215 OFFICE O/P EST HI 40 MIN: CPT | Performed by: INTERNAL MEDICINE

## 2021-04-30 RX ORDER — METHOTREXATE 25 MG/ML
1 CC INJECTION, SOLUTION INTRA-ARTERIAL; INTRAMUSCULAR; INTRAVENOUS
Qty: 2 | Refills: 5 | Status: DISCONTINUED | COMMUNITY

## 2021-04-30 RX ORDER — RABEPRAZOLE SODIUM 20 MG/1
TAKE 1 TABLET BY MOUTH TWICE DAILY TABLET, DELAYED RELEASE ORAL
Qty: 180 | Refills: 3 | Status: ACTIVE | COMMUNITY
Start: 2019-02-17

## 2021-04-30 RX ORDER — PREDNISONE 5 MG/1
1 TABLET TABLET ORAL ONCE A DAY
Qty: 30 TABLETS | Refills: 1 | Status: ACTIVE | COMMUNITY
Start: 2021-04-07 | End: 2021-06-06

## 2021-04-30 RX ORDER — LETROZOLE TABLETS 2.5 MG/1
TAKE 1 TABLET (2.5 MG) BY ORAL ROUTE ONCE DAILY TABLET, FILM COATED ORAL 1
Qty: 0 | Refills: 0 | Status: ACTIVE | COMMUNITY
Start: 1900-01-01

## 2021-04-30 RX ORDER — METHOTREXATE SODIUM 25 MG/ML
INJECT 0.5 ML  BY SUBCUTANEOUS ROUTE ONCE WEEKLY INJECTION, SOLUTION INTRA-ARTERIAL; INTRAMUSCULAR; INTRAVENOUS
Qty: 1 | Refills: 0 | Status: DISCONTINUED | COMMUNITY
Start: 1900-01-01

## 2021-04-30 RX ORDER — IBUPROFEN 200 MG/1
TAKE 1 CAPSULE (200 MG) BY ORAL ROUTE EVERY 6 HOURS AS NEEDED CAPSULE, LIQUID FILLED ORAL
Qty: 0 | Refills: 0 | Status: ACTIVE | COMMUNITY
Start: 1900-01-01

## 2021-04-30 RX ORDER — DIPHENOXYLATE HCL/ATROPINE 2.5-.025MG
TAKE 2 TABLETS (5 MG) BY ORAL ROUTE 2 TIMES PER DAY FOR 90 DAYS TABLET ORAL 2
Qty: 360 | Refills: 0 | Status: DISCONTINUED | COMMUNITY
Start: 1900-01-01

## 2021-04-30 RX ORDER — IBUPROFEN 100 MG/1
TAKE 2 TABLETS (200 MG) BY ORAL ROUTE EVERY 6 HOURS AS NEEDED WITH FOOD TABLET, COATED ORAL
Qty: 0 | Refills: 0 | Status: ACTIVE | COMMUNITY
Start: 1900-01-01

## 2021-04-30 RX ORDER — CYANOCOBALAMIN 1000 UG/ML
INJECT 1ML INTRAMUSCULARLY ONCE EVERY MONTH INJECTION INTRAMUSCULAR; SUBCUTANEOUS
Qty: 12 | Refills: 0 | Status: ACTIVE | COMMUNITY
Start: 2018-07-02

## 2021-04-30 RX ORDER — PROMETHAZINE HYDROCHLORIDE 25 MG/1
TAKE 1 TABLETS TABLET ORAL BID
Qty: 60 TABLETS | Refills: 5 | Status: ACTIVE | COMMUNITY
Start: 2021-02-12

## 2021-04-30 RX ORDER — RABEPRAZOLE SODIUM 20 MG/1
TAKE 1 TABLET BY MOUTH TWICE DAILY TABLET, DELAYED RELEASE ORAL
Qty: 180 | Refills: 3 | Status: ACTIVE | COMMUNITY

## 2021-04-30 RX ORDER — METHYLPREDNISOLONE 4 MG/1
AS DIRECTED TABLET ORAL ONCE DAILY
Qty: 21 TABLETS | Refills: 1 | Status: ACTIVE | COMMUNITY
Start: 2021-04-22 | End: 2021-05-04

## 2021-04-30 RX ORDER — VEDOLIZUMAB 300 MG/5ML
INFUSE 300 MG OVER 30 MINUTE(S) BY INTRAVENOUS ROUTE EVERY 4 WEEKS INJECTION, POWDER, LYOPHILIZED, FOR SOLUTION INTRAVENOUS
Qty: 1 | Refills: 11 | Status: ACTIVE | COMMUNITY
Start: 1900-01-01

## 2021-04-30 RX ORDER — PREDNISONE 10 MG/1
TAKE 40 MG DAILY TABLET ORAL ONCE A DAY
Status: ACTIVE | COMMUNITY

## 2021-04-30 RX ORDER — ALENDRONATE SODIUM 70 MG
TABLET ORAL
Qty: 0 | Refills: 0 | Status: ACTIVE | COMMUNITY
Start: 1900-01-01

## 2021-04-30 NOTE — HPI-TODAY'S VISIT:
56 yo female comes in for f/u.  She is "off" per Dr. Jaquez.  She did an MRI. Dr. Jaquez reportedly thinks it is her meds. Has not seen a neurologist in years. Recent  labs were nl mostly with Hb 10.1, nl K-4.0 and Ca. Nl ESR and CRP, but had just finished medrol dose eliza.  Wants a Mg level. Labs to be sent to [facility].  Nl quantiferon.  Currently , getting Entyvio every 4 weeks.  Getting entyvio every 4 weeks. Taking 2 weeks off mtx due to stomatitis. If resuming, would reduce from 1 cc to .5 cc..  She is not sure how much she needs mtx and how much it has helped in the past. Mtx has very likely caused the stomatis. Or the pred medrol could have helped the mouth and belly. She has avn all over.  Myerson will see her and order bone scan  Addendum ------21 Has thicker stools Has to change her bag because of high outputs She says her stoma is an outie  She is on pred 20 mg and should taper off it by going to 10 mg to 5 mg for a week and then d/c Risks of prednisone discussed.  Had dropped mtx from 25 to 12.5 and will go back up to 25 mg a week. Consider [place] referral if not improvimg at that point. ============ 21  56 yo female with CD and ostomy for 5 month f/u.  Abd is good. Ostomy is functioning. Sleep thru much of night.  Appetite and intake is better. Oral CD problematic. Has been in touch with Dr. Pepe ---- swish and spit.  Max-- 150. Current weight is 121.  Not taking supplements --  Boost and Ensure. 2-3 cans The y don't agree with her.. R Lactose free ice  She wants low dose prednisone. Lets try 2.5-5 mg a day.  ================== 20  56 yo female for 3 week TH f/u. Has upper abdominal pain that may be due to gallstones. Had referred her to Dr. Moreland but she did not call. She heard he was retiring and not. Has abdominal pain when she. Left upper quadrant or "upper rib cage" mostly left. He knows Dr. Talamantes. -  Frustrated---- blisters on lip. Torn open. Has not tried steroid rinse or paste in past. Magic  mouth wash helps. Does not reduce the blisters. Dr. Best- non steroid options for mouth   ======================== 10/23/20  10/23 56 yo female for 3 week TH f/u. Has oral Crohn's and I-C and stoma issues. Entyvio may not cover the EIM. Might try oral steroid lozenge --- pred paste and lozenges.  Entyvio 300 q 4 weeks Mtx 1 cc q week Pred 5 mg may stop. Bentyl -  Abd pain is ok No idea what triggered her admission Food induced -   Consider medrol dose eliza in the future  Extensive lab review CBC --- Hct 31.6/Hb 10.9 plt 120 CRP 22 ESR 56 Iron 33% Ferrintin good ================================================== 10/2/20  56 yo female with  Crohn's disease recently hosp 3 days at  for abd pain, N/V and got better with 3 dasy 3 days of iv steroids.  Surgery considered.  Was on 40 mg pred - intolerant anxiety  Now dropped to 30 mg a day. Now is 50-60% better than admission.  Surgery debated gallstones. They leaned toward blockage.  Has been on Stelara. Entyvio works better than Stelara. Mtx makes her tired She would like to up the dose of lomotil.  20  55 yo female with Crohnn's disease having continued active syx. Getting Entyvio every 4 weeks. Methotrexate 12.5 mg per week Dicyclomine tablets 2 po qid  == She has taken Xifaxan and it tears her up.   Then stop it. Does not like flagyl and does not help.  Pepto 1-2 doses a day, does help the diarreha. She uses Immodium every day.  Stooling ----- empties bag 6-10 times a day. Abdominal pain - when she eats and intermittent. Energy - gone.  Labs- Anemia - Hb lower CRP is higher ---34 (was 16).   ========================== 20  Crohn's disease.     55 yo female with Crohn's disease comes in for f/u.  Dr. Jaquez is considering a GYN etiology.  Needs a pouch exam.  Getting Entyvio every 4 weeks.  Stelara did not work as well as Entyvio.  Normally when she gets an infusion, she gets a bounce and has not.  Prometheus Vedo level 3/25, just before 4 week dose was great_ __ _32.5 and no antibodies.  Feb 10 labs looked good CRP 29.9 and cbc looked fine.  ESR elevation.  2 liters of miralax_ __ _ Try 6-8am _ __ _- and then an exam at 2-3 pm She asked about a pilonidal cyst and I directed to Dr. Talamantes  Xifaxan  Try pepto bismol 1-2 prn on days.    ===== 20  55 yo female comes in at my request for 3 week f/u of Crohn's due to new 4 x eleation of CRP at 29.9.  19 CRP was similarly elevated 4x at 2.1/.5  Leonid is checking CT abd-pelvis due to pelvic pain which is not characteristic of her Crohn's.  She is wondering about GYN.  Or spastic bladder.  Dr. Talamantes thought stable but wants to scope to be sure.  Empties bag 10x a day.That is more than 3-6 months ago and has leakage of the ostomy bag. Maybe more volume.  Very watery.  Review of labs from 2/10 show cRP 29.i9 but others are stable. CBC looks good with trace elevation of wbc and borderline anemia. Getting iv iron from Arianna Jaquez. Vit D is normal at 39. Albumin is normal at 4.5.  MRI 10/1/19 showed gallstones , present in past, and she has mild pain that is intermittent Liver tests are nl. Can assess with HIDA, and/or with surgical consult, e.g ???relative need for choly, high vs. low.    ================ 20  55 yo female with severe Crohn's disease and h/o breast cancer _ _ Arianna Jaquez MD on Letrasol. 10th year.  Abdominal pain is confusing.  Has gotten pain in the ovary and in the uterus. GYN-Ann Laird MD and did US was fine. Sent to PT for pelvic treatment.  At her comment, I reviewed GB imaging 16 nl CTE 5/3/17 nl GB US Oct 2 2019 "Cholelithiasis"   o/w MRE was nl - no inflammation. BUT THE MRI DID SHOW "PERSISTENT TETHERING OF UTERUS TO RECTAL STUMP"  On Entyvio 300 mg q 4 weeks.  Next dose in 2 weeks On mtx _ _.5 cc. Monthly b12 shots  Eating ok, some weight loss. Weight stable x 6 months and down 30 lb since _ __ _- she is comfortable with current weight.  ============= 19  55 yo female with severe Crohn's and h/o breast cancer comes in for 3 month f/u.  Doing poorly.  Daily abd pain which is intermittent.  When she eats she gets pain and nausea.  Taking zofran and phenergan.  Sleep pattern is awry.  Empties bag 5 times a day and going out of rectum.  Has a loop ileostomy that may be problematic.  Allergic to tape.  Currently on Entyvio 300 mg q 8 weeks.  Stelara did not work as well.  Discussed the entyvio at shorter cycles of q 4 weeks to see if that will control syx better.  Outputs are higher and she may be volume depleted and maybe more entyvio will help.  last entyvio was last week and so a level won't be useful today.  Will just proceed with dose increase.    ============== 18 51 yo female with Crohn's disease and breast cancer and diverting ileostomy from Dr. Talamantes.  Skin issue - addressed by Dr. Best.  See Brooke Talamantes MD and enterostomal therapist.  AGWS.  No fcs.      Review of Systems  ConstitutionalDenies : body aches, fever, weight gain, weight loss  CardiovascularDenies : chest pain, heart racing/skipping, high blood pressure  RespiratoryDenies : chronic cough, shortness of breath, wheezing or asthma symptoms  GastrointestinalDenies : Abdominal Pain/discomfort, Anal/Rectal Pain or Itching, Anal Spasm, Black Stool, Bloating/belching/gaseouness, Change of Bowel Habit, Constipation, Diarrhea/Loose Stool, Difficulty in Swallowing, Heartburn/esophageal reflux, Hemorrhoids, Indigestion, Mucus in Stool, Nausea/vomiting, Rectal Bleeding, Unintentional Weight Loss    Vitals  Date Time BP Position Site L\R Cuff Size HR RR TEMP (F) WT  HT  BMI kg/m2 BSA m2 O2 Sat HC     2020 05:15 PM         133lbs 0oz 5'  8.5" 19.93 1.71          Assessment  Crohn's Disease     555.2  Diarrhea     787.91/R19.7  Cholelithiasis     574.20/K80.20  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 () - - 2020  EGD w/Biopsy if indicated (95230) - - 2020  Pouchoscopy (17804) - - 2020  CMP (comprehensive metabolic panel) (49150) - - 2020  Sed rate (35397) - - 2020  C-reactive protein (42502) - - 2020  Ferritin assay (34899) - - 2020  Iron + iron binding capacity panel ser/plas (04360, 00908) - - 2020  Vitamin B12 and folate measurement (86397, 24181) - - 2020  CBC with diff (01051) - - 2020  25-OH-Vitamin D (00943) - - 2020  GPP 22 (FilmArray) - Gastrointestinal Pathogen Panel - QDx (00818) - - 2020  MedicationsMedications have been Reconciled  Transition of Care or Provider Policy  InstructionsPatient seen today via telehealth by agreement and consent of patient in light of current COVID-19 pandemic. I used video conferencing during the visit. The patient encounter is appropriate and reasonable under the circumstances given the patient's particular presentation at this time. The patient has been advised of the followin) the potential risks and limitations of this mode of treatment (including but not limited to the absence of in-person examination); 2) the right to refuse telehealth services at any point without affecting the right to future care; 3) the right to receive in-person services, included immediately after this consultation if an urgent need arises; 4) information, including identifiable images or information from this telehealth consult, will only be shared in accordance with HIPPA regulations. Any and all of the patient's and/or patient's family member's questions on this issue have been answered. The patient has verbally consented to be treated via telehealth services. The patient has also been advised to contact this office for worsening conditions or problems, and seek emergency medical treatment and/or call 911 if the patient deems either necessary.  (For commercial payers, telehealth video only) More than half of the face-to-face time used for counseling and coordination of care.  40 min appointment (36818 EP)  I have documented a list of current medications and reviewed it with the patient.  I encouraged the patient to diet and exercise.  DispositionReturn To Clinic in 2 Months  CorrespondenceCC this document (Slade Auguste MD, Brooke Talamantes MD, Arianna Jaquez MD) - 2020    03855 exam med 32 min time  needs refill or lomotil Can't eRx lomotil.  Aciphex refill.  Needs EGD and pouchoscopy         Electronically Signed by: MD MIGUEL A Carpio

## 2021-05-11 ENCOUNTER — TELEPHONE ENCOUNTER (OUTPATIENT)
Dept: URBAN - METROPOLITAN AREA CLINIC 98 | Facility: CLINIC | Age: 56
End: 2021-05-11

## 2021-05-11 ENCOUNTER — LAB OUTSIDE AN ENCOUNTER (OUTPATIENT)
Dept: URBAN - METROPOLITAN AREA CLINIC 98 | Facility: CLINIC | Age: 56
End: 2021-05-11

## 2021-05-11 RX ORDER — PREDNISONE 10 MG/1
1 TABLET TABLET ORAL ONCE A DAY
Qty: 30 TABLETS | Refills: 1 | OUTPATIENT
Start: 2021-05-11 | End: 2021-07-10

## 2021-05-19 LAB
A/G RATIO: 1.5
ALBUMIN: 3.7
ALKALINE PHOSPHATASE: 67
ALT (SGPT): 11
AST (SGOT): 7
BASO (ABSOLUTE): 0
BASOS: 0
BILIRUBIN, TOTAL: 0.2
BUN/CREATININE RATIO: 22
BUN: 15
CALCIUM: 9.1
CARBON DIOXIDE, TOTAL: 27
CHLORIDE: 101
CREATININE: 0.68
EGFR IF AFRICN AM: 115
EGFR IF NONAFRICN AM: 99
EOS (ABSOLUTE): 0
EOS: 1
GLOBULIN, TOTAL: 2.4
GLUCOSE: 99
HEMATOCRIT: 33
HEMATOLOGY COMMENTS:: (no result)
HEMOGLOBIN: 11.5
IMMATURE CELLS: (no result)
IMMATURE GRANS (ABS): 0.1
IMMATURE GRANULOCYTES: 1
LYMPHS (ABSOLUTE): 0.6
LYMPHS: 7
MCH: 40.4
MCHC: 34.8
MCV: 116
MONOCYTES(ABSOLUTE): 0.4
MONOCYTES: 5
NEUTROPHILS (ABSOLUTE): 7.1
NEUTROPHILS: 86
NRBC: (no result)
PLATELETS: 161
POTASSIUM: 4.8
PROTEIN, TOTAL: 6.1
RBC: 2.85
RDW: 13.2
SODIUM: 139
WBC: 8.2

## 2021-05-20 ENCOUNTER — TELEPHONE ENCOUNTER (OUTPATIENT)
Dept: URBAN - METROPOLITAN AREA CLINIC 92 | Facility: CLINIC | Age: 56
End: 2021-05-20

## 2021-05-22 LAB
ANTI-VEDOLIZUMAB ANTIBODY: <25
MAGNESIUM: 1.4
VEDOLIZUMAB: 44

## 2021-05-27 ENCOUNTER — LAB OUTSIDE AN ENCOUNTER (OUTPATIENT)
Dept: URBAN - METROPOLITAN AREA CLINIC 98 | Facility: CLINIC | Age: 56
End: 2021-05-27

## 2021-06-02 ENCOUNTER — TELEPHONE ENCOUNTER (OUTPATIENT)
Dept: URBAN - METROPOLITAN AREA CLINIC 98 | Facility: CLINIC | Age: 56
End: 2021-06-02

## 2021-06-02 RX ORDER — CYANOCOBALAMIN 1000 UG/ML
INJECT 1ML INTRAMUSCULARLY ONCE EVERY MONTH INJECTION INTRAMUSCULAR; SUBCUTANEOUS
Qty: 12 VIALS | Refills: 3
Start: 2018-07-02 | End: 2022-05-28

## 2021-06-02 RX ORDER — CYANOCOBALAMIN 1000 UG/ML
INJECT 1 ML INTRAMUSCULAR INJECTION INTRAMUSCULAR; SUBCUTANEOUS
Qty: 12 VIALS | Refills: 3
Start: 2018-07-02 | End: 2022-05-28

## 2021-06-02 RX ORDER — DIPHENOXYLATE HCL/ATROPINE 2.5-.025MG
TAKE 2 TABLETS TABLET ORAL TWICE DAILY
Qty: 360 TABLETS | Refills: 3

## 2021-06-02 RX ORDER — DIPHENOXYLATE HYDROCHLORIDE AND ATROPINE SULFATE 2.5; .025 MG/1; MG/1
TAKE 2 TABLETS TABLET ORAL
Qty: 120 TABLETS | Refills: 11 | OUTPATIENT
Start: 2021-06-02 | End: 2022-05-28

## 2021-06-07 ENCOUNTER — TELEPHONE ENCOUNTER (OUTPATIENT)
Dept: URBAN - METROPOLITAN AREA CLINIC 92 | Facility: CLINIC | Age: 56
End: 2021-06-07

## 2021-06-07 RX ORDER — DIPHENOXYLATE HCL/ATROPINE 2.5-.025MG
TAKE 2 TABLETS TABLET ORAL TWICE DAILY
Qty: 360 TABLETS | Refills: 3
End: 2022-06-02

## 2021-06-26 LAB
A/G RATIO: 1.9
ALBUMIN: 4.1
ALKALINE PHOSPHATASE: 88
ALT (SGPT): 5
AST (SGOT): 9
BASO (ABSOLUTE): 0
BASOS: 1
BILIRUBIN, TOTAL: <0.2
BUN/CREATININE RATIO: 19
BUN: 12
C-REACTIVE PROTEIN, QUANT: 20
CALCIUM: 9
CARBON DIOXIDE, TOTAL: 23
CHLORIDE: 101
CREATININE: 0.62
EGFR IF AFRICN AM: 117
EGFR IF NONAFRICN AM: 102
EOS (ABSOLUTE): 0.2
EOS: 4
GLOBULIN, TOTAL: 2.2
GLUCOSE: 102
HEMATOCRIT: 30.2
HEMATOLOGY COMMENTS:: (no result)
HEMOGLOBIN: 10.8
IMMATURE CELLS: (no result)
IMMATURE GRANS (ABS): 0
IMMATURE GRANULOCYTES: 0
LYMPHS (ABSOLUTE): 1.1
LYMPHS: 23
MAGNESIUM: 1.1
MCH: 39
MCHC: 35.8
MCV: 109
MONOCYTES(ABSOLUTE): 0.5
MONOCYTES: 10
NEUTROPHILS (ABSOLUTE): 3.2
NEUTROPHILS: 62
NRBC: (no result)
PLATELETS: 175
POTASSIUM: 4.2
PROTEIN, TOTAL: 6.3
RBC: 2.77
RDW: 12.8
SEDIMENTATION RATE-WESTERGREN: 36
SODIUM: 136
WBC: 5

## 2021-06-30 ENCOUNTER — TELEPHONE ENCOUNTER (OUTPATIENT)
Dept: URBAN - METROPOLITAN AREA CLINIC 92 | Facility: CLINIC | Age: 56
End: 2021-06-30

## 2021-07-01 ENCOUNTER — TELEPHONE ENCOUNTER (OUTPATIENT)
Dept: URBAN - METROPOLITAN AREA CLINIC 98 | Facility: CLINIC | Age: 56
End: 2021-07-01

## 2021-07-19 ENCOUNTER — LAB OUTSIDE AN ENCOUNTER (OUTPATIENT)
Dept: URBAN - METROPOLITAN AREA CLINIC 98 | Facility: CLINIC | Age: 56
End: 2021-07-19

## 2021-07-20 LAB
A/G RATIO: 1.6
ALBUMIN: 4.4
ALKALINE PHOSPHATASE: 98
ALT (SGPT): 7
AST (SGOT): 8
BASO (ABSOLUTE): 0
BASOS: 0
BILIRUBIN, TOTAL: 0.3
BUN/CREATININE RATIO: 16
BUN: 13
C-REACTIVE PROTEIN, QUANT: 78
CALCIUM: 9.5
CARBON DIOXIDE, TOTAL: 22
CHLORIDE: 98
CREATININE: 0.8
EGFR IF AFRICN AM: 96
EGFR IF NONAFRICN AM: 83
EOS (ABSOLUTE): 0.1
EOS: 1
GLOBULIN, TOTAL: 2.7
GLUCOSE: 103
HEMATOCRIT: 32.8
HEMATOLOGY COMMENTS:: (no result)
HEMOGLOBIN: 11.1
IMMATURE CELLS: (no result)
IMMATURE GRANS (ABS): 0
IMMATURE GRANULOCYTES: 1
LYMPHS (ABSOLUTE): 1.1
LYMPHS: 17
MAGNESIUM: 1.1
MCH: 37.5
MCHC: 33.8
MCV: 111
MONOCYTES(ABSOLUTE): 0.4
MONOCYTES: 7
NEUTROPHILS (ABSOLUTE): 4.9
NEUTROPHILS: 74
NRBC: (no result)
PLATELETS: 195
POTASSIUM: 4.1
PROTEIN, TOTAL: 7.1
RBC: 2.96
RDW: 13.2
SEDIMENTATION RATE-WESTERGREN: 41
SODIUM: 135
WBC: 6.5

## 2021-07-21 ENCOUNTER — TELEPHONE ENCOUNTER (OUTPATIENT)
Dept: URBAN - METROPOLITAN AREA CLINIC 98 | Facility: CLINIC | Age: 56
End: 2021-07-21

## 2021-07-21 RX ORDER — BUDESONIDE 3 MG/1
2 CAPSULES CAPSULE ORAL ONCE A DAY
Qty: 60 CAPSULES | Refills: 5 | OUTPATIENT
Start: 2021-07-21 | End: 2022-01-16

## 2021-07-28 ENCOUNTER — TELEPHONE ENCOUNTER (OUTPATIENT)
Dept: URBAN - METROPOLITAN AREA CLINIC 98 | Facility: CLINIC | Age: 56
End: 2021-07-28

## 2021-08-03 ENCOUNTER — TELEPHONE ENCOUNTER (OUTPATIENT)
Dept: URBAN - METROPOLITAN AREA CLINIC 98 | Facility: CLINIC | Age: 56
End: 2021-08-03

## 2021-08-03 ENCOUNTER — OFFICE VISIT (OUTPATIENT)
Dept: URBAN - METROPOLITAN AREA TELEHEALTH 2 | Facility: TELEHEALTH | Age: 56
End: 2021-08-03
Payer: COMMERCIAL

## 2021-08-03 DIAGNOSIS — Z79.899 IMMUNOSUPPRESSION DUE TO DRUG THERAPY: ICD-10-CM

## 2021-08-03 DIAGNOSIS — R19.7 DIARRHEA: ICD-10-CM

## 2021-08-03 DIAGNOSIS — R10.84 ABDOMINAL PAIN, GENERALIZED: ICD-10-CM

## 2021-08-03 DIAGNOSIS — K50.80 CROHN'S DISEASE OF BOTH SMALL AND LARGE INTESTINE WITHOUT COMPLICATIONS: ICD-10-CM

## 2021-08-03 PROCEDURE — 99213 OFFICE O/P EST LOW 20 MIN: CPT | Performed by: INTERNAL MEDICINE

## 2021-08-03 RX ORDER — RABEPRAZOLE SODIUM 20 MG/1
TAKE 1 TABLET BY MOUTH TWICE DAILY TABLET, DELAYED RELEASE ORAL
Qty: 180 | Refills: 3 | Status: ACTIVE | COMMUNITY
Start: 2019-02-17

## 2021-08-03 RX ORDER — PROMETHAZINE HYDROCHLORIDE 25 MG/1
TAKE 1 TABLETS TABLET ORAL BID
Qty: 60 TABLETS | Refills: 5 | Status: ACTIVE | COMMUNITY
Start: 2021-02-12

## 2021-08-03 RX ORDER — RABEPRAZOLE SODIUM 20 MG/1
TAKE 1 TABLET BY MOUTH TWICE DAILY TABLET, DELAYED RELEASE ORAL
Qty: 180 | Refills: 3 | Status: ACTIVE | COMMUNITY

## 2021-08-03 RX ORDER — CYANOCOBALAMIN 1000 UG/ML
INJECT 1 ML INTRAMUSCULAR INJECTION INTRAMUSCULAR; SUBCUTANEOUS
Qty: 12 VIALS | Refills: 3 | Status: ACTIVE | COMMUNITY
Start: 2018-07-02 | End: 2022-05-28

## 2021-08-03 RX ORDER — DIPHENOXYLATE HCL/ATROPINE 2.5-.025MG
TAKE 2 TABLETS TABLET ORAL TWICE DAILY
Qty: 360 TABLETS | Refills: 3 | Status: ACTIVE | COMMUNITY
End: 2022-06-02

## 2021-08-03 RX ORDER — ALENDRONATE SODIUM 70 MG
TABLET ORAL
Qty: 0 | Refills: 0 | Status: ACTIVE | COMMUNITY
Start: 1900-01-01

## 2021-08-03 RX ORDER — IBUPROFEN 200 MG/1
TAKE 1 CAPSULE (200 MG) BY ORAL ROUTE EVERY 6 HOURS AS NEEDED CAPSULE, LIQUID FILLED ORAL
Qty: 0 | Refills: 0 | Status: ACTIVE | COMMUNITY
Start: 1900-01-01

## 2021-08-03 RX ORDER — BUDESONIDE 3 MG/1
2 CAPSULES CAPSULE ORAL ONCE A DAY
Qty: 60 CAPSULES | Refills: 5 | Status: ACTIVE | COMMUNITY
Start: 2021-07-21 | End: 2022-01-16

## 2021-08-03 RX ORDER — LETROZOLE TABLETS 2.5 MG/1
TAKE 1 TABLET (2.5 MG) BY ORAL ROUTE ONCE DAILY TABLET, FILM COATED ORAL 1
Qty: 0 | Refills: 0 | Status: ACTIVE | COMMUNITY
Start: 1900-01-01

## 2021-08-03 RX ORDER — IBUPROFEN 100 MG/1
TAKE 2 TABLETS (200 MG) BY ORAL ROUTE EVERY 6 HOURS AS NEEDED WITH FOOD TABLET, COATED ORAL
Qty: 0 | Refills: 0 | Status: ACTIVE | COMMUNITY
Start: 1900-01-01

## 2021-08-03 RX ORDER — PREDNISONE 10 MG/1
TAKE 40 MG DAILY TABLET ORAL ONCE A DAY
Status: ACTIVE | COMMUNITY

## 2021-08-03 RX ORDER — VEDOLIZUMAB 300 MG/5ML
INFUSE 300 MG OVER 30 MINUTE(S) BY INTRAVENOUS ROUTE EVERY 4 WEEKS INJECTION, POWDER, LYOPHILIZED, FOR SOLUTION INTRAVENOUS
Qty: 1 | Refills: 11 | Status: ACTIVE | COMMUNITY
Start: 1900-01-01

## 2021-08-03 RX ORDER — DIPHENOXYLATE HYDROCHLORIDE AND ATROPINE SULFATE 2.5; .025 MG/1; MG/1
TAKE 2 TABLETS TABLET ORAL
Qty: 120 TABLETS | Refills: 11 | Status: ACTIVE | COMMUNITY
Start: 2021-06-02 | End: 2022-05-28

## 2021-08-03 NOTE — HPI-TODAY'S VISIT:
55 yo female for  f/u due to beeing weak and in bed. In ER last week and they sent her home.  Meds: Entyvio 300 mg q 4 weeks Entocort  x 1 week Off prednisone - had been on prednisone- she does not think it is what was helping her. On 1 cc mtx per week.  Devin Auguste is primary MD Saw him a month ago.  Main syx now.5 days ago was throwing up and was so distended. Could not poke her stomach. went to the ER Wednesday. N/V. Aciphex 1 twice daily. Last looked  Never had an ulcer. Ileostomy in place.  Fever on Friday, up to 101. Eating now. Hungry yesterday  Not vomiting.  May neede EGD.     ====================== 21  56 yo female comes in for f/u.  She is "off" per Dr. Jaquez.  She did an MRI. Dr. Jaquez reportedly thinks it is her meds. Has not seen a neurologist in years. Recent  labs were nl mostly with Hb 10.1, nl K-4.0 and Ca. Nl ESR and CRP, but had just finished medrol dose eliza.  Wants a Mg level. Labs to be sent to [facility].  Nl quantiferon.  Currently , getting Entyvio every 4 weeks.  Getting entyvio every 4 weeks. Taking 2 weeks off mtx due to stomatitis. If resuming, would reduce from 1 cc to .5 cc..  She is not sure how much she needs mtx and how much it has helped in the past. Mtx has very likely caused the stomatis. Or the pred medrol could have helped the mouth and belly. She has avn all over.  Myerson will see her and order bone scan  Addendum ------21 Has thicker stools Has to change her bag because of high outputs She says her stoma is an outie  She is on pred 20 mg and should taper off it by going to 10 mg to 5 mg for a week and then d/c Risks of prednisone discussed.  Had dropped mtx from 25 to 12.5 and will go back up to 25 mg a week. Consider [place] referral if not improvimg at that point. ============ 21  56 yo female with CD and ostomy for 5 month f/u.  Abd is good. Ostomy is functioning. Sleep thru much of night.  Appetite and intake is better. Oral CD problematic. Has been in touch with Dr. Pepe ---- swish and spit.  Max-- 150. Current weight is 121.  Not taking supplements --  Boost and Ensure. 2-3 cans The y don't agree with her.. R Lactose free ice  She wants low dose prednisone. Lets try 2.5-5 mg a day.  ================== 20  56 yo female for 3 week  f/u. Has upper abdominal pain that may be due to gallstones. Had referred her to Dr. Moreland but she did not call. She heard he was retiring and not. Has abdominal pain when she. Left upper quadrant or "upper rib cage" mostly left. He knows Dr. Talamantes. -  Frustrated---- blisters on lip. Torn open. Has not tried steroid rinse or paste in past. Magic  mouth wash helps. Does not reduce the blisters. Dr. Best- non steroid options for mouth   ======================== 10/23/20  10/23 56 yo female for 3 week  f/u. Has oral Crohn's and I-C and stoma issues. Entyvio may not cover the EIM. Might try oral steroid lozenge --- pred paste and lozenges.  Entyvio 300 q 4 weeks Mtx 1 cc q week Pred 5 mg may stop. Bentyl -  Abd pain is ok No idea what triggered her admission Food induced -   Consider medrol dose eliza in the future  Extensive lab review CBC --- Hct 31.6/Hb 10.9 plt 120 CRP 22 ESR 56 Iron 33% Ferrintin good ================================================== 10/2/20  56 yo female with  Crohn's disease recently hosp 3 days at  for abd pain, N/V and got better with 3 dasy 3 days of iv steroids.  Surgery considered.  Was on 40 mg pred - intolerant anxiety  Now dropped to 30 mg a day. Now is 50-60% better than admission.  Surgery debated gallstones. They leaned toward blockage.  Has been on Stelara. Entyvio works better than Stelara. Mtx makes her tired She would like to up the dose of lomotil.  20  55 yo female with Crohnn's disease having continued active syx. Getting Entyvio every 4 weeks. Methotrexate 12.5 mg per week Dicyclomine tablets 2 po qid  == She has taken Xifaxan and it tears her up.   Then stop it. Does not like flagyl and does not help.  Pepto 1-2 doses a day, does help the diarreha. She uses Immodium every day.  Stooling ----- empties bag 6-10 times a day. Abdominal pain - when she eats and intermittent. Energy - gone.  Labs- Anemia - Hb lower CRP is higher ---34 (was 16).   ========================== 20  Crohn's disease.     55 yo female with Crohn's disease comes in for f/u.  Dr. Jaquez is considering a GYN etiology.  Needs a pouch exam.  Getting Entyvio every 4 weeks.  Stelara did not work as well as Entyvio.  Normally when she gets an infusion, she gets a bounce and has not.  Prometheus Vedo level 3/25, just before 4 week dose was great_ __ _32.5 and no antibodies.  Feb 10 labs looked good CRP 29.9 and cbc looked fine.  ESR elevation.  2 liters of miralax_ __ _ Try 6-8am _ __ _- and then an exam at 2-3 pm She asked about a pilonidal cyst and I directed to Dr. Albin Owen  Try pepto bismol 1-2 prn on days.    ===== 20  55 yo female comes in at my request for 3 week f/u of Crohn's due to new 4 x eleation of CRP at 29.9.  19 CRP was similarly elevated 4x at 2.1/.5  Radhaoliva is checking CT abd-pelvis due to pelvic pain which is not characteristic of her Crohn's.  She is wondering about GYN.  Or spastic bladder.  Dr. Talamantes thought stable but wants to scope to be sure.  Empties bag 10x a day.That is more than 3-6 months ago and has leakage of the ostomy bag. Maybe more volume.  Very watery.  Review of labs from 2/10 show cRP 29.i9 but others are stable. CBC looks good with trace elevation of wbc and borderline anemia. Getting iv iron from Arianna Jaquez. Vit D is normal at 39. Albumin is normal at 4.5.  MRI 10/1/19 showed gallstones , present in past, and she has mild pain that is intermittent Liver tests are nl. Can assess with HIDA, and/or with surgical consult, e.g ???relative need for choly, high vs. low.    ================ 20  55 yo female with severe Crohn's disease and h/o breast cancer _ _ Arianna Jaquez MD on Letrasol. 10th year.  Abdominal pain is confusing.  Has gotten pain in the ovary and in the uterus. GYN-Ann Laird MD and did US was fine. Sent to PT for pelvic treatment.  At her comment, I reviewed GB imaging 16 nl CTE 5/3/17 nl GB US Oct 2 2019 "Cholelithiasis"   o/w MRE was nl - no inflammation. BUT THE MRI DID SHOW "PERSISTENT TETHERING OF UTERUS TO RECTAL STUMP"  On Entyvio 300 mg q 4 weeks.  Next dose in 2 weeks On mtx _ _.5 cc. Monthly b12 shots  Eating ok, some weight loss. Weight stable x 6 months and down 30 lb since _ __ _- she is comfortable with current weight.  ============= 19  55 yo female with severe Crohn's and h/o breast cancer comes in for 3 month f/u.  Doing poorly.  Daily abd pain which is intermittent.  When she eats she gets pain and nausea.  Taking zofran and phenergan.  Sleep pattern is awry.  Empties bag 5 times a day and going out of rectum.  Has a loop ileostomy that may be problematic.  Allergic to tape.  Currently on Entyvio 300 mg q 8 weeks.  Stelara did not work as well.  Discussed the entyvio at shorter cycles of q 4 weeks to see if that will control syx better.  Outputs are higher and she may be volume depleted and maybe more entyvio will help.  last entyvio was last week and so a level won't be useful today.  Will just proceed with dose increase.    ============== 18 53 yo female with Crohn's disease and breast cancer and diverting ileostomy from Dr. Talamantes.  Skin issue - addressed by Dr. Best.  See Brooke Talamantes MD and enterostomal therapist.  AGWS.  No fcs.      Review of Systems  ConstitutionalDenies : body aches, fever, weight gain, weight loss  CardiovascularDenies : chest pain, heart racing/skipping, high blood pressure  RespiratoryDenies : chronic cough, shortness of breath, wheezing or asthma symptoms  GastrointestinalDenies : Abdominal Pain/discomfort, Anal/Rectal Pain or Itching, Anal Spasm, Black Stool, Bloating/belching/gaseouness, Change of Bowel Habit, Constipation, Diarrhea/Loose Stool, Difficulty in Swallowing, Heartburn/esophageal reflux, Hemorrhoids, Indigestion, Mucus in Stool, Nausea/vomiting, Rectal Bleeding, Unintentional Weight Loss    Vitals  Date Time BP Position Site L\R Cuff Size HR RR TEMP (F) WT  HT  BMI kg/m2 BSA m2 O2 Sat HC     2020 05:15 PM         133lbs 0oz 5'  8.5" 19.93 1.71          Assessment  Crohn's Disease     555.2  Diarrhea     787.91/R19.7  Cholelithiasis     574.20/K80.20  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 () - - 2020  EGD w/Biopsy if indicated (38776) - - 2020  Pouchoscopy (85560) - - 2020  CMP (comprehensive metabolic panel) (32015) - - 2020  Sed rate (25585) - - 2020  C-reactive protein (11170) - - 2020  Ferritin assay (72468) - - 2020  Iron + iron binding capacity panel ser/plas (06612, 56826) - - 2020  Vitamin B12 and folate measurement (43545, 24853) - - 2020  CBC with diff (26647) - - 2020  25-OH-Vitamin D (64558) - - 2020  GPP 22 (FilmArray) - Gastrointestinal Pathogen Panel - QDx (23422) - - 2020  MedicationsMedications have been Reconciled  Transition of Care or Provider Policy  InstructionsPatient seen today via telehealth by agreement and consent of patient in light of current COVID-19 pandemic. I used video conferencing during the visit. The patient encounter is appropriate and reasonable under the circumstances given the patient's particular presentation at this time. The patient has been advised of the followin) the potential risks and limitations of this mode of treatment (including but not limited to the absence of in-person examination); 2) the right to refuse telehealth services at any point without affecting the right to future care; 3) the right to receive in-person services, included immediately after this consultation if an urgent need arises; 4) information, including identifiable images or information from this telehealth consult, will only be shared in accordance with HIPPA regulations. Any and all of the patient's and/or patient's family member's questions on this issue have been answered. The patient has verbally consented to be treated via telehealth services. The patient has also been advised to contact this office for worsening conditions or problems, and seek emergency medical treatment and/or call 911 if the patient deems either necessary.  (For commercial payers, telehealth video only) More than half of the face-to-face time used for counseling and coordination of care.  40 min appointment (27677 EP)  I have documented a list of current medications and reviewed it with the patient.  I encouraged the patient to diet and exercise.  DispositionReturn To Clinic in 2 Months  CorrespondenceCC this document (Slade Auguste MD, Brooke Talamantes MD, Arianna Jaquez MD) - 2020    61606 exam med 32 min time  needs refill or lomotil Can't eRx lomotil.  Aciphex refill.  Needs EGD and pouchoscopy         Electronically Signed by: MD Cindy CaprioA

## 2021-08-04 ENCOUNTER — TELEPHONE ENCOUNTER (OUTPATIENT)
Dept: URBAN - METROPOLITAN AREA CLINIC 98 | Facility: CLINIC | Age: 56
End: 2021-08-04

## 2021-08-04 RX ORDER — PROMETHAZINE HYDROCHLORIDE 6.25 MG/5ML
10 ML AS NEEDED SOLUTION ORAL
Qty: 1200 ML | Refills: 3 | OUTPATIENT
Start: 2021-08-04 | End: 2021-12-01

## 2021-08-13 ENCOUNTER — TELEPHONE ENCOUNTER (OUTPATIENT)
Dept: URBAN - METROPOLITAN AREA CLINIC 98 | Facility: CLINIC | Age: 56
End: 2021-08-13

## 2021-08-13 RX ORDER — METHOTREXATE 2.5 MG/1
TAKE 5 TABLETS TABLET ORAL
Qty: 60 TABLETS | Refills: 5
Start: 2021-08-13

## 2021-08-13 RX ORDER — METHOTREXATE 2.5 MG/1
TAKE 5 TABLETS TABLET ORAL
Qty: 60 TABLETS | Refills: 5 | OUTPATIENT
Start: 2021-08-13

## 2021-08-14 ENCOUNTER — ERX REFILL RESPONSE (OUTPATIENT)
Dept: URBAN - METROPOLITAN AREA CLINIC 98 | Facility: CLINIC | Age: 56
End: 2021-08-14

## 2021-08-14 RX ORDER — METHOTREXATE 25 MG/ML
INJECT 1 ML INTO THE MUSCLE EVERY 7 DAYS INJECTION, SOLUTION INTRA-ARTERIAL; INTRAMUSCULAR; INTRATHECAL; INTRAVENOUS
Qty: 8 MILLILITER | Refills: 4 | OUTPATIENT

## 2021-08-16 ENCOUNTER — TELEPHONE ENCOUNTER (OUTPATIENT)
Dept: URBAN - METROPOLITAN AREA CLINIC 98 | Facility: CLINIC | Age: 56
End: 2021-08-16

## 2021-08-16 RX ORDER — METHOTREXATE 25 MG/ML
INJECT 1 ML INTO THE MUSCLE EVERY 7 DAYS INJECTION, SOLUTION INTRA-ARTERIAL; INTRAMUSCULAR; INTRATHECAL; INTRAVENOUS
Qty: 4 VIALS | Refills: 6

## 2021-08-16 RX ORDER — METHOTREXATE 25 MG/ML
INJECT 1 ML INTO THE MUSCLE EVERY 7 DAYS INJECTION, SOLUTION INTRA-ARTERIAL; INTRAMUSCULAR; INTRATHECAL; INTRAVENOUS
Qty: 8 MILLILITER | Refills: 0

## 2021-08-18 LAB
A/G RATIO: 1.6
ALBUMIN: 4.1
ALKALINE PHOSPHATASE: 84
AMYLASE: 67
AST (SGOT): 11
BASO (ABSOLUTE): 0
BASOS: 1
BILIRUBIN, TOTAL: 0.3
BUN/CREATININE RATIO: 21
BUN: 16
C-REACTIVE PROTEIN, QUANT: 13
CALCIUM: 8.9
CHLORIDE: 103
CREATININE: 0.75
EGFR IF AFRICN AM: 104
EGFR IF NONAFRICN AM: 90
EOS (ABSOLUTE): 0.1
EOS: 3
GLOBULIN, TOTAL: 2.6
GLUCOSE: 105
HEMATOCRIT: 28.1
HEMATOLOGY COMMENTS:: (no result)
HEMOGLOBIN: 9.8
IMMATURE CELLS: (no result)
IMMATURE GRANS (ABS): 0
IMMATURE GRANULOCYTES: 0
LIPASE: 30
LYMPHS (ABSOLUTE): 1
LYMPHS: 29
MAGNESIUM: 1.1
MCH: 38
MCHC: 34.9
MCV: 109
MONOCYTES(ABSOLUTE): 0.4
MONOCYTES: 12
NEUTROPHILS (ABSOLUTE): 1.9
NEUTROPHILS: 55
NRBC: (no result)
PLATELETS: 165
POTASSIUM: 4.1
PROTEIN, TOTAL: 6.7
RBC: 2.58
RDW: 13.5
SEDIMENTATION RATE-WESTERGREN: 44
SODIUM: 140
WBC: 3.4

## 2021-08-19 ENCOUNTER — OFFICE VISIT (OUTPATIENT)
Dept: URBAN - METROPOLITAN AREA CLINIC 98 | Facility: CLINIC | Age: 56
End: 2021-08-19

## 2021-08-19 RX ORDER — PREDNISONE 10 MG/1
TAKE 40 MG DAILY TABLET ORAL ONCE A DAY
Status: ACTIVE | COMMUNITY

## 2021-08-19 RX ORDER — CYANOCOBALAMIN 1000 UG/ML
INJECT 1 ML INTRAMUSCULAR INJECTION INTRAMUSCULAR; SUBCUTANEOUS
Qty: 12 VIALS | Refills: 3 | Status: ACTIVE | COMMUNITY
Start: 2018-07-02 | End: 2022-05-28

## 2021-08-19 RX ORDER — VEDOLIZUMAB 300 MG/5ML
INFUSE 300 MG OVER 30 MINUTE(S) BY INTRAVENOUS ROUTE EVERY 4 WEEKS INJECTION, POWDER, LYOPHILIZED, FOR SOLUTION INTRAVENOUS
Qty: 1 | Refills: 11 | Status: ACTIVE | COMMUNITY
Start: 1900-01-01

## 2021-08-19 RX ORDER — DIPHENOXYLATE HCL/ATROPINE 2.5-.025MG
TAKE 2 TABLETS TABLET ORAL TWICE DAILY
Qty: 360 TABLETS | Refills: 3 | Status: ACTIVE | COMMUNITY
End: 2022-06-02

## 2021-08-19 RX ORDER — DIPHENOXYLATE HYDROCHLORIDE AND ATROPINE SULFATE 2.5; .025 MG/1; MG/1
TAKE 2 TABLETS TABLET ORAL
Qty: 120 TABLETS | Refills: 11 | Status: ACTIVE | COMMUNITY
Start: 2021-06-02 | End: 2022-05-28

## 2021-08-19 RX ORDER — ALENDRONATE SODIUM 70 MG
TABLET ORAL
Qty: 0 | Refills: 0 | Status: ACTIVE | COMMUNITY
Start: 1900-01-01

## 2021-08-19 RX ORDER — PROMETHAZINE HYDROCHLORIDE 25 MG/1
TAKE 1 TABLETS TABLET ORAL BID
Qty: 60 TABLETS | Refills: 5 | Status: ACTIVE | COMMUNITY
Start: 2021-02-12

## 2021-08-19 RX ORDER — LETROZOLE TABLETS 2.5 MG/1
TAKE 1 TABLET (2.5 MG) BY ORAL ROUTE ONCE DAILY TABLET, FILM COATED ORAL 1
Qty: 0 | Refills: 0 | Status: ACTIVE | COMMUNITY
Start: 1900-01-01

## 2021-08-19 RX ORDER — RABEPRAZOLE SODIUM 20 MG/1
TAKE 1 TABLET BY MOUTH TWICE DAILY TABLET, DELAYED RELEASE ORAL
Qty: 180 | Refills: 3 | Status: ACTIVE | COMMUNITY

## 2021-08-19 RX ORDER — IBUPROFEN 100 MG/1
TAKE 2 TABLETS (200 MG) BY ORAL ROUTE EVERY 6 HOURS AS NEEDED WITH FOOD TABLET, COATED ORAL
Qty: 0 | Refills: 0 | Status: ACTIVE | COMMUNITY
Start: 1900-01-01

## 2021-08-19 RX ORDER — IBUPROFEN 200 MG/1
TAKE 1 CAPSULE (200 MG) BY ORAL ROUTE EVERY 6 HOURS AS NEEDED CAPSULE, LIQUID FILLED ORAL
Qty: 0 | Refills: 0 | Status: ACTIVE | COMMUNITY
Start: 1900-01-01

## 2021-08-19 RX ORDER — PROMETHAZINE HYDROCHLORIDE 6.25 MG/5ML
10 ML AS NEEDED SOLUTION ORAL
Qty: 1200 ML | Refills: 3 | Status: ACTIVE | COMMUNITY
Start: 2021-08-04 | End: 2021-12-01

## 2021-08-19 RX ORDER — BUDESONIDE 3 MG/1
2 CAPSULES CAPSULE ORAL ONCE A DAY
Qty: 60 CAPSULES | Refills: 5 | Status: ACTIVE | COMMUNITY
Start: 2021-07-21 | End: 2022-01-16

## 2021-08-19 RX ORDER — RABEPRAZOLE SODIUM 20 MG/1
TAKE 1 TABLET BY MOUTH TWICE DAILY TABLET, DELAYED RELEASE ORAL
Qty: 180 | Refills: 3 | Status: ACTIVE | COMMUNITY
Start: 2019-02-17

## 2021-09-02 ENCOUNTER — OFFICE VISIT (OUTPATIENT)
Dept: URBAN - METROPOLITAN AREA CLINIC 98 | Facility: CLINIC | Age: 56
End: 2021-09-02
Payer: COMMERCIAL

## 2021-09-02 DIAGNOSIS — R10.84 ABDOMINAL PAIN, GENERALIZED: ICD-10-CM

## 2021-09-02 DIAGNOSIS — Z79.899 IMMUNOSUPPRESSION DUE TO DRUG THERAPY: ICD-10-CM

## 2021-09-02 DIAGNOSIS — K50.80 CROHN'S DISEASE OF BOTH SMALL AND LARGE INTESTINE WITHOUT COMPLICATIONS: ICD-10-CM

## 2021-09-02 DIAGNOSIS — K80.20 CHOLELITHIASIS: ICD-10-CM

## 2021-09-02 DIAGNOSIS — R19.7 DIARRHEA: ICD-10-CM

## 2021-09-02 PROCEDURE — 99215 OFFICE O/P EST HI 40 MIN: CPT | Performed by: INTERNAL MEDICINE

## 2021-09-02 RX ORDER — VEDOLIZUMAB 300 MG/5ML
INFUSE 300 MG OVER 30 MINUTE(S) BY INTRAVENOUS ROUTE EVERY 4 WEEKS INJECTION, POWDER, LYOPHILIZED, FOR SOLUTION INTRAVENOUS
Qty: 1 | Refills: 11 | Status: ACTIVE | COMMUNITY
Start: 1900-01-01

## 2021-09-02 RX ORDER — RABEPRAZOLE SODIUM 20 MG/1
TAKE 1 TABLET BY MOUTH TWICE DAILY TABLET, DELAYED RELEASE ORAL
Qty: 180 | Refills: 3 | Status: ACTIVE | COMMUNITY

## 2021-09-02 RX ORDER — IBUPROFEN 100 MG/1
TAKE 2 TABLETS (200 MG) BY ORAL ROUTE EVERY 6 HOURS AS NEEDED WITH FOOD TABLET, COATED ORAL
Qty: 0 | Refills: 0 | Status: ACTIVE | COMMUNITY
Start: 1900-01-01

## 2021-09-02 RX ORDER — DIPHENOXYLATE HCL/ATROPINE 2.5-.025MG
TAKE 2 TABLETS TABLET ORAL TWICE DAILY
Qty: 360 TABLETS | Refills: 3 | Status: ACTIVE | COMMUNITY
End: 2022-06-02

## 2021-09-02 RX ORDER — BUDESONIDE 3 MG/1
2 CAPSULES CAPSULE ORAL ONCE A DAY
Qty: 60 CAPSULES | Refills: 5 | Status: ACTIVE | COMMUNITY
Start: 2021-07-21 | End: 2022-01-16

## 2021-09-02 RX ORDER — PROMETHAZINE HYDROCHLORIDE 25 MG/1
TAKE 1 TABLETS TABLET ORAL BID
Qty: 60 TABLETS | Refills: 5 | Status: ACTIVE | COMMUNITY
Start: 2021-02-12

## 2021-09-02 RX ORDER — METHOTREXATE 25 MG/ML
INJECT 1 ML INTO THE MUSCLE EVERY 7 DAYS INJECTION, SOLUTION INTRA-ARTERIAL; INTRAMUSCULAR; INTRATHECAL; INTRAVENOUS
Qty: 4 VIALS | Refills: 6 | Status: ACTIVE | COMMUNITY

## 2021-09-02 RX ORDER — PROMETHAZINE HYDROCHLORIDE 6.25 MG/5ML
10 ML AS NEEDED SOLUTION ORAL
Qty: 1200 ML | Refills: 3 | Status: ACTIVE | COMMUNITY
Start: 2021-08-04 | End: 2021-12-01

## 2021-09-02 RX ORDER — CYANOCOBALAMIN 1000 UG/ML
INJECT 1 ML INTRAMUSCULAR INJECTION INTRAMUSCULAR; SUBCUTANEOUS
Qty: 12 VIALS | Refills: 3 | Status: ACTIVE | COMMUNITY
Start: 2018-07-02 | End: 2022-05-28

## 2021-09-02 RX ORDER — PREDNISONE 10 MG/1
TAKE 40 MG DAILY TABLET ORAL ONCE A DAY
Status: ACTIVE | COMMUNITY

## 2021-09-02 RX ORDER — IBUPROFEN 200 MG/1
TAKE 1 CAPSULE (200 MG) BY ORAL ROUTE EVERY 6 HOURS AS NEEDED CAPSULE, LIQUID FILLED ORAL
Qty: 0 | Refills: 0 | Status: ACTIVE | COMMUNITY
Start: 1900-01-01

## 2021-09-02 RX ORDER — RABEPRAZOLE SODIUM 20 MG/1
TAKE 1 TABLET BY MOUTH TWICE DAILY TABLET, DELAYED RELEASE ORAL
Qty: 180 | Refills: 3 | Status: ACTIVE | COMMUNITY
Start: 2019-02-17

## 2021-09-02 RX ORDER — LETROZOLE TABLETS 2.5 MG/1
TAKE 1 TABLET (2.5 MG) BY ORAL ROUTE ONCE DAILY TABLET, FILM COATED ORAL 1
Qty: 0 | Refills: 0 | Status: ACTIVE | COMMUNITY
Start: 1900-01-01

## 2021-09-02 RX ORDER — ALENDRONATE SODIUM 70 MG
TABLET ORAL
Qty: 0 | Refills: 0 | Status: ACTIVE | COMMUNITY
Start: 1900-01-01

## 2021-09-02 RX ORDER — DIPHENOXYLATE HYDROCHLORIDE AND ATROPINE SULFATE 2.5; .025 MG/1; MG/1
TAKE 2 TABLETS TABLET ORAL
Qty: 120 TABLETS | Refills: 11 | Status: ACTIVE | COMMUNITY
Start: 2021-06-02 | End: 2022-05-28

## 2021-09-02 RX ORDER — METHOTREXATE 2.5 MG/1
TAKE 5 TABLETS TABLET ORAL
Qty: 60 TABLETS | Refills: 5 | Status: ACTIVE | COMMUNITY
Start: 2021-08-13

## 2021-09-02 NOTE — HPI-TODAY'S VISIT:
55 yo female with CD now for 1 month f/u after telehealth and doing better 5 weeks into course of Entocort tx.  Does havd avascular necrosis of hips and understands there are risks of similar complications with Entocort therapy.  Was on fosamax. I just called Dr. Myerson to arrange an appoinment.  Ostomy - empties it 6 times so far today. Occasional abdominal pain.  On Entyvio every 4 weeks Entocort 9 mg a day x 5 weeks.  Asked to have CD4/CD8 checked, by sister's immunologist.   ===================== 8/3/21  55 yo female for  f/u due to beeing weak and in bed. In ER last week and they sent her home.  Meds: Entyvio 300 mg q 4 weeks Entocort  x 1 week Off prednisone - had been on prednisone- she does not think it is what was helping her. On 1 cc mtx per week.  Devin Auguste is primary MD Saw him a month ago.  Main syx now.5 days ago was throwing up and was so distended. Could not poke her stomach. went to the ER Wednesday. N/V. Aciphex 1 twice daily. Last looked  Never had an ulcer. Ileostomy in place.  Fever on Friday, up to 101. Eating now. Hungry yesterday  Not vomiting.  May neede EGD.     ====================== 21  56 yo female comes in for f/u.  She is "off" per Dr. Jaquez.  She did an MRI. Dr. Jaquez reportedly thinks it is her meds. Has not seen a neurologist in years. Recent  labs were nl mostly with Hb 10.1, nl K-4.0 and Ca. Nl ESR and CRP, but had just finished medrol dose eliza.  Wants a Mg level. [facility] to be [facility].  Nl quantiferon.  Currently , getting Entyvio every 4 weeks.  Getting entyvio every 4 weeks. Taking 2 weeks off mtx due to stomatitis. If resuming, would reduce from 1 cc to .5 cc..  She is not sure how much she needs mtx and how much it has helped in the past. Mtx has very likely caused the stomatis. Or the pred medrol could have helped the mouth and belly. She has avn all over.  Myerson will see her and order bone scan  Addendum ------21 Has thicker stools Has to change her bag because of high outputs She says her stoma is an outie  She is on pred 20 mg and should taper off it by going to 10 mg to 5 mg for a week and then d/c Risks of prednisone discussed.  Had dropped mtx from 25 to 12.5 and will go back up to 25 mg a week. Consider [place] referral if not improvimg at that point. ============ 21  56 yo female with CD and ostomy for 5 month f/u.  Abd is good. Ostomy is functioning. Sleep thru much of night.  Appetite and intake is better. Oral CD problematic. Has been in touch with Dr. Pepe ---- swish and spit.  Max-- 150. Current weight is 121.  Not taking supplements --  Boost and Ensure. 2-3 cans The y don't agree with her.. R Lactose free ice  She wants low dose prednisone. Lets try 2.5-5 mg a day.  ================== 20  56 yo female for 3 week TH f/u. Has upper abdominal pain that may be due to gallstones. Had referred her to Dr. Moreland but she did not call. She heard he was retiring and not. Has abdominal pain when she. Left upper quadrant or "upper rib cage" mostly left. He knows Dr. Talamantes. -  Frustrated---- blisters on lip. Torn open. Has not tried steroid rinse or paste in past. Magic  mouth wash helps. Does not reduce the blisters. Dr. Best- non steroid options for mouth   ======================== 10/23/20  10/23 56 yo female for 3 week TH f/u. Has oral Crohn's and I-C and stoma issues. Entyvio may not cover the EIM. Might try oral steroid lozenge --- pred paste and lozenges.  Entyvio 300 q 4 weeks Mtx 1 cc q week Pred 5 mg may stop. Bentyl -  Abd pain is ok No idea what triggered her admission Food induced -   Consider medrol dose eliza in the future  Extensive lab review CBC --- Hct 31.6/Hb 10.9 plt 120 CRP 22 ESR 56 Iron 33% Ferrintin good ================================================== 10/2/20  56 yo female with  Crohn's disease recently hosp 3 days at  for abd pain, N/V and got better with 3 dasy 3 days of iv steroids.  Surgery considered.  Was on 40 mg pred - intolerant anxiety  Now dropped to 30 mg a day. Now is 50-60% better than admission.  Surgery debated gallstones. They leaned toward blockage.  Has been on Stelara. Entyvio works better than Stelara. Mtx makes her tired She would like to up the dose of lomotil.  20  53 yo female with Crohnn's disease having continued active syx. Getting Entyvio every 4 weeks. Methotrexate 12.5 mg per week Dicyclomine tablets 2 po qid  == She has taken Xifaxan and it tears her up.   Then stop it. Does not like flagyl and does not help.  Pepto 1-2 doses a day, does help the diarreha. She uses Immodium every day.  Stooling ----- empties bag 6-10 times a day. Abdominal pain - when she eats and intermittent. Energy - gone.  Labs- Anemia - Hb lower CRP is higher ---34 (was 16).   ========================== 20  Crohn's disease.     53 yo female with Crohn's disease comes in for f/u.  Dr. Jaquez is considering a GYN etiology.  Needs a pouch exam.  Getting Entyvio every 4 weeks.  Stelara did not work as well as Entyvio.  Normally when she gets an infusion, she gets a bounce and has not.  Prometheus Vedo level 3/25, just before 4 week dose was great_ __ _32.5 and no antibodies.  Feb 10 labs looked good CRP 29.9 and cbc looked fine.  ESR elevation.  2 liters of miralax_ __ _ Try 6-8am _ __ _- and then an exam at 2-3 pm She asked about a pilonidal cyst and I directed to Dr. Talamantes  Xifaxan  Try pepto bismol 1-2 prn on days.    ===== 20  53 yo female comes in at my request for 3 week f/u of Crohn's due to new 4 x eleation of CRP at 29.9.  19 CRP was similarly elevated 4x at 2.1/.5  Radhaoliva is checking CT abd-pelvis due to pelvic pain which is not characteristic of her Crohn's.  She is wondering about GYN.  Or spastic bladder.  Dr. Talamantes thought stable but wants to scope to be sure.  Empties bag 10x a day.That is more than 3-6 months ago and has leakage of the ostomy bag. Maybe more volume.  Very watery.  Review of labs from 2/10 show cRP 29.i9 but others are stable. CBC looks good with trace elevation of wbc and borderline anemia. Getting iv iron from Arianna Jaquez. Vit D is normal at 39. Albumin is normal at 4.5.  MRI 10/1/19 showed gallstones , present in past, and she has mild pain that is intermittent Liver tests are nl. Can assess with HIDA, and/or with surgical consult, e.g ???relative need for choly, high vs. low.    ================ 20  53 yo female with severe Crohn's disease and h/o breast cancer _ _ Arianna Jaquez MD on Letrasol.  year.  Abdominal pain is confusing.  Has gotten pain in the ovary and in the uterus. GYN-Ann Laird MD and did US was fine. Sent to PT for pelvic treatment.  At her comment, I reviewed GB imaging 16 nl CTE 5/3/17 nl GB US Oct 2 2019 "Cholelithiasis"   o/w MRE was nl - no inflammation. BUT THE MRI DID SHOW "PERSISTENT TETHERING OF UTERUS TO RECTAL STUMP"  On Entyvio 300 mg q 4 weeks.  Next dose in 2 weeks On mtx _ _.5 cc. Monthly b12 shots  Eating ok, some weight loss. Weight stable x 6 months and down 30 lb since _ __ _- she is comfortable with current weight.  ============= 19  53 yo female with severe Crohn's and h/o breast cancer comes in for 3 month f/u.  Doing poorly.  Daily abd pain which is intermittent.  When she eats she gets pain and nausea.  Taking zofran and phenergan.  Sleep pattern is awry.  Empties bag 5 times a day and going out of rectum.  Has a loop ileostomy that may be problematic.  Allergic to tape.  Currently on Entyvio 300 mg q 8 weeks.  Stelara did not work as well.  Discussed the entyvio at shorter cycles of q 4 weeks to see if that will control syx better.  Outputs are higher and she may be volume depleted and maybe more entyvio will help.  last entyvio was last week and so a level won't be useful today.  Will just proceed with dose increase.    ============== 18 53 yo female with Crohn's disease and breast cancer and diverting ileostomy from Dr. Talamantes.  Skin issue - addressed by Dr. Best.  See Brooke Talamantes MD and enterostomal therapist.  AGWS.  No fcs.      Review of Systems  ConstitutionalDenies : body aches, fever, weight gain, weight loss  CardiovascularDenies : chest pain, heart racing/skipping, high blood pressure  RespiratoryDenies : chronic cough, shortness of breath, wheezing or asthma symptoms  GastrointestinalDenies : Abdominal Pain/discomfort, Anal/Rectal Pain or Itching, Anal Spasm, Black Stool, Bloating/belching/gaseouness, Change of Bowel Habit, Constipation, Diarrhea/Loose Stool, Difficulty in Swallowing, Heartburn/esophageal reflux, Hemorrhoids, Indigestion, Mucus in Stool, Nausea/vomiting, Rectal Bleeding, Unintentional Weight Loss    Vitals  Date Time BP Position Site L\R Cuff Size HR RR TEMP (F) WT  HT  BMI kg/m2 BSA m2 O2 Sat HC     2020 05:15 PM         133lbs 0oz 5'  8.5" 19.93 1.71          Assessment  Crohn's Disease     555.2  Diarrhea     787.91/R19.7  Cholelithiasis     574.20/K80.20  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 () - - 2020  EGD w/Biopsy if indicated (08349) - - 2020  Pouchoscopy (96572) - - 2020  CMP (comprehensive metabolic panel) (32732) - - 2020  Sed rate (44433) - - 2020  C-reactive protein (01403) - - 2020  Ferritin assay (49389) - - 2020  Iron + iron binding capacity panel ser/plas (04086, 19859) - - 2020  Vitamin B12 and folate measurement (11421, 33717) - - 2020  CBC with diff (86368) - - 2020  25-OH-Vitamin D (90849) - - 2020  GPP 22 (FilmArray) - Gastrointestinal Pathogen Panel - QDx (52959) - - 2020  MedicationsMedications have been Reconciled  Transition of Care or Provider Policy  InstructionsPatient seen today via telehealth by agreement and consent of patient in light of current COVID-19 pandemic. I used video conferencing during the visit. The patient encounter is appropriate and reasonable under the circumstances given the patient's particular presentation at this time. The patient has been advised of the followin) the potential risks and limitations of this mode of treatment (including but not limited to the absence of in-person examination); 2) the right to refuse telehealth services at any point without affecting the right to future care; 3) the right to receive in-person services, included immediately after this consultation if an urgent need arises; 4) information, including identifiable images or information from this telehealth consult, will only be shared in accordance with HIPPA regulations. Any and all of the patient's and/or patient's family member's questions on this issue have been answered. The patient has verbally consented to be treated via telehealth services. The patient has also been advised to contact this office for worsening conditions or problems, and seek emergency medical treatment and/or call 911 if the patient deems either necessary.  (For commercial payers, telehealth video only) More than half of the face-to-face time used for counseling and coordination of care.  40 min appointment (22023 EP)  I have documented a list of current medications and reviewed it with the patient.  I encouraged the patient to diet and exercise.  DispositionReturn To Clinic in 2 Months  CorrespondenceCC this document (Slade Auguste MD, Brooke Talamantes MD, Arianna Jaquez MD) - 2020    04276 exam med 32 min time  needs refill or lomotil Can't eRx lomotil.  Aciphex refill.  Needs EGD and pouchoscopy         Electronically Signed by: MD MIGUEL A Carpio

## 2021-09-03 ENCOUNTER — LAB OUTSIDE AN ENCOUNTER (OUTPATIENT)
Dept: URBAN - METROPOLITAN AREA CLINIC 98 | Facility: CLINIC | Age: 56
End: 2021-09-03

## 2021-09-03 ENCOUNTER — TELEPHONE ENCOUNTER (OUTPATIENT)
Dept: URBAN - METROPOLITAN AREA CLINIC 98 | Facility: CLINIC | Age: 56
End: 2021-09-03

## 2021-09-03 RX ORDER — ONDANSETRON HYDROCHLORIDE 8 MG/1
1 CAPSULE TABLET, FILM COATED ORAL
Qty: 120 CAPSULES | Refills: 5 | OUTPATIENT
Start: 2021-09-03 | End: 2022-03-01

## 2021-09-04 LAB
A/G RATIO: 1.8
ALBUMIN: 4.6
ALKALINE PHOSPHATASE: 95
AST (SGOT): 12
BASO (ABSOLUTE): 0
BASOS: 1
BILIRUBIN, TOTAL: 0.2
BUN/CREATININE RATIO: 28
BUN: 21
C-REACTIVE PROTEIN, QUANT: 8
CALCIUM: 9.4
CHLORIDE: 101
CREATININE: 0.74
EGFR IF AFRICN AM: 105
EGFR IF NONAFRICN AM: 91
EOS (ABSOLUTE): 0
EOS: 1
FERRITIN, SERUM: 1169
FOLATE (FOLIC ACID), SERUM: >20
GLOBULIN, TOTAL: 2.6
GLUCOSE: 101
HEMATOCRIT: 33.2
HEMATOLOGY COMMENTS:: (no result)
HEMOGLOBIN: 11.5
IMMATURE CELLS: (no result)
IMMATURE GRANS (ABS): 0
IMMATURE GRANULOCYTES: 1
IRON BIND.CAP.(TIBC): 232
IRON SATURATION: 92
IRON: 214
LYMPHS (ABSOLUTE): 0.8
LYMPHS: 10
MCH: 39
MCHC: 34.6
MCV: 113
MONOCYTES(ABSOLUTE): 0.5
MONOCYTES: 6
NEUTROPHILS (ABSOLUTE): 6.8
NEUTROPHILS: 81
NRBC: (no result)
PLATELETS: 260
POTASSIUM: 4.1
PROTEIN, TOTAL: 7.2
RBC: 2.95
RDW: 14.1
SEDIMENTATION RATE-WESTERGREN: 35
SODIUM: 139
UIBC: 18
VITAMIN B12: 982
VITAMIN D, 25-HYDROXY: 36.3
WBC: 8.3

## 2021-09-05 LAB
% CD 4 POS. LYMPH.: 51.8
% CD 8 POS. LYMPH.: 31.8
ABSOLUTE CD 4 HELPER: 414
BASO (ABSOLUTE): 0
BASOS: 1
CD4/CD8 RATIO: 1.63
EOS (ABSOLUTE): 0
EOS: 1
HEMATOCRIT: 33.2
HEMATOLOGY COMMENTS:: (no result)
HEMOGLOBIN: 11.6
IMMATURE CELLS: (no result)
IMMATURE GRANS (ABS): 0
IMMATURE GRANULOCYTES: 0
LYMPHS (ABSOLUTE): 0.8
LYMPHS: 10
Lab: 254
MCH: 38.7
MCHC: 34.9
MCV: 111
MONOCYTES(ABSOLUTE): 0.5
MONOCYTES: 7
NEUTROPHILS (ABSOLUTE): 6.6
NEUTROPHILS: 81
NRBC: (no result)
PLATELETS: 239
RBC: 3
RDW: 13.7
WBC: 8.1

## 2021-09-27 ENCOUNTER — OFFICE VISIT (OUTPATIENT)
Dept: URBAN - METROPOLITAN AREA CLINIC 98 | Facility: CLINIC | Age: 56
End: 2021-09-27

## 2021-09-27 RX ORDER — METHOTREXATE 2.5 MG/1
TAKE 5 TABLETS TABLET ORAL
Qty: 60 TABLETS | Refills: 5 | Status: ACTIVE | COMMUNITY
Start: 2021-08-13

## 2021-09-27 RX ORDER — DIPHENOXYLATE HCL/ATROPINE 2.5-.025MG
TAKE 2 TABLETS TABLET ORAL TWICE DAILY
Qty: 360 TABLETS | Refills: 3 | Status: ACTIVE | COMMUNITY
End: 2022-06-02

## 2021-09-27 RX ORDER — VEDOLIZUMAB 300 MG/5ML
INFUSE 300 MG OVER 30 MINUTE(S) BY INTRAVENOUS ROUTE EVERY 4 WEEKS INJECTION, POWDER, LYOPHILIZED, FOR SOLUTION INTRAVENOUS
Qty: 1 | Refills: 11 | Status: ACTIVE | COMMUNITY
Start: 1900-01-01

## 2021-09-27 RX ORDER — IBUPROFEN 200 MG/1
TAKE 1 CAPSULE (200 MG) BY ORAL ROUTE EVERY 6 HOURS AS NEEDED CAPSULE, LIQUID FILLED ORAL
Qty: 0 | Refills: 0 | Status: ACTIVE | COMMUNITY
Start: 1900-01-01

## 2021-09-27 RX ORDER — LETROZOLE TABLETS 2.5 MG/1
TAKE 1 TABLET (2.5 MG) BY ORAL ROUTE ONCE DAILY TABLET, FILM COATED ORAL 1
Qty: 0 | Refills: 0 | Status: ACTIVE | COMMUNITY
Start: 1900-01-01

## 2021-09-27 RX ORDER — METHOTREXATE 25 MG/ML
INJECT 1 ML INTO THE MUSCLE EVERY 7 DAYS INJECTION, SOLUTION INTRA-ARTERIAL; INTRAMUSCULAR; INTRATHECAL; INTRAVENOUS
Qty: 4 VIALS | Refills: 6 | Status: ACTIVE | COMMUNITY

## 2021-09-27 RX ORDER — BUDESONIDE 3 MG/1
2 CAPSULES CAPSULE ORAL ONCE A DAY
Qty: 60 CAPSULES | Refills: 5 | Status: ACTIVE | COMMUNITY
Start: 2021-07-21 | End: 2022-01-16

## 2021-09-27 RX ORDER — IBUPROFEN 100 MG/1
TAKE 2 TABLETS (200 MG) BY ORAL ROUTE EVERY 6 HOURS AS NEEDED WITH FOOD TABLET, COATED ORAL
Qty: 0 | Refills: 0 | Status: ACTIVE | COMMUNITY
Start: 1900-01-01

## 2021-09-27 RX ORDER — PREDNISONE 10 MG/1
TAKE 40 MG DAILY TABLET ORAL ONCE A DAY
Status: ACTIVE | COMMUNITY

## 2021-09-27 RX ORDER — ALENDRONATE SODIUM 70 MG
TABLET ORAL
Qty: 0 | Refills: 0 | Status: ACTIVE | COMMUNITY
Start: 1900-01-01

## 2021-09-27 RX ORDER — RABEPRAZOLE SODIUM 20 MG/1
TAKE 1 TABLET BY MOUTH TWICE DAILY TABLET, DELAYED RELEASE ORAL
Qty: 180 | Refills: 3 | Status: ACTIVE | COMMUNITY

## 2021-09-27 RX ORDER — CYANOCOBALAMIN 1000 UG/ML
INJECT 1 ML INTRAMUSCULAR INJECTION INTRAMUSCULAR; SUBCUTANEOUS
Qty: 12 VIALS | Refills: 3 | Status: ACTIVE | COMMUNITY
Start: 2018-07-02 | End: 2022-05-28

## 2021-09-27 RX ORDER — RABEPRAZOLE SODIUM 20 MG/1
TAKE 1 TABLET BY MOUTH TWICE DAILY TABLET, DELAYED RELEASE ORAL
Qty: 180 | Refills: 3 | Status: ACTIVE | COMMUNITY
Start: 2019-02-17

## 2021-09-27 RX ORDER — ONDANSETRON HYDROCHLORIDE 8 MG/1
1 CAPSULE TABLET, FILM COATED ORAL
Qty: 120 CAPSULES | Refills: 5 | Status: ACTIVE | COMMUNITY
Start: 2021-09-03 | End: 2022-03-01

## 2021-09-27 RX ORDER — DIPHENOXYLATE HYDROCHLORIDE AND ATROPINE SULFATE 2.5; .025 MG/1; MG/1
TAKE 2 TABLETS TABLET ORAL
Qty: 120 TABLETS | Refills: 11 | Status: ACTIVE | COMMUNITY
Start: 2021-06-02 | End: 2022-05-28

## 2021-09-27 RX ORDER — PROMETHAZINE HYDROCHLORIDE 25 MG/1
TAKE 1 TABLETS TABLET ORAL BID
Qty: 60 TABLETS | Refills: 5 | Status: ACTIVE | COMMUNITY
Start: 2021-02-12

## 2021-09-27 RX ORDER — PROMETHAZINE HYDROCHLORIDE 6.25 MG/5ML
10 ML AS NEEDED SOLUTION ORAL
Qty: 1200 ML | Refills: 3 | Status: ACTIVE | COMMUNITY
Start: 2021-08-04 | End: 2021-12-01

## 2021-10-06 ENCOUNTER — OFFICE VISIT (OUTPATIENT)
Dept: URBAN - METROPOLITAN AREA CLINIC 98 | Facility: CLINIC | Age: 56
End: 2021-10-06
Payer: COMMERCIAL

## 2021-10-06 DIAGNOSIS — R19.7 DIARRHEA: ICD-10-CM

## 2021-10-06 DIAGNOSIS — K80.20 CHOLELITHIASIS: ICD-10-CM

## 2021-10-06 DIAGNOSIS — K50.90 CROHN DISEASE: ICD-10-CM

## 2021-10-06 DIAGNOSIS — Z79.899 IMMUNOSUPPRESSION DUE TO DRUG THERAPY: ICD-10-CM

## 2021-10-06 DIAGNOSIS — K50.80 CROHN'S DISEASE: ICD-10-CM

## 2021-10-06 DIAGNOSIS — R10.84 ABDOMINAL PAIN, GENERALIZED: ICD-10-CM

## 2021-10-06 DIAGNOSIS — K50.80 CROHN'S DISEASE OF BOTH SMALL AND LARGE INTESTINE WITHOUT COMPLICATIONS: ICD-10-CM

## 2021-10-06 PROCEDURE — 99214 OFFICE O/P EST MOD 30 MIN: CPT | Performed by: INTERNAL MEDICINE

## 2021-10-06 RX ORDER — BUDESONIDE 3 MG/1
2 CAPSULES CAPSULE ORAL ONCE A DAY
Qty: 60 CAPSULES | Refills: 5 | Status: ACTIVE | COMMUNITY
Start: 2021-07-21 | End: 2022-01-16

## 2021-10-06 RX ORDER — METHOTREXATE 2.5 MG/1
TAKE 5 TABLETS TABLET ORAL
Qty: 60 TABLETS | Refills: 5 | Status: ACTIVE | COMMUNITY
Start: 2021-08-13

## 2021-10-06 RX ORDER — RABEPRAZOLE SODIUM 20 MG/1
TAKE 1 TABLET BY MOUTH TWICE DAILY TABLET, DELAYED RELEASE ORAL
Qty: 180 | Refills: 3 | Status: ACTIVE | COMMUNITY

## 2021-10-06 RX ORDER — ALENDRONATE SODIUM 70 MG
TABLET ORAL
Qty: 0 | Refills: 0 | Status: ACTIVE | COMMUNITY
Start: 1900-01-01

## 2021-10-06 RX ORDER — PROMETHAZINE HYDROCHLORIDE 6.25 MG/5ML
10 ML AS NEEDED SOLUTION ORAL
Qty: 1200 ML | Refills: 3 | Status: ACTIVE | COMMUNITY
Start: 2021-08-04 | End: 2021-12-01

## 2021-10-06 RX ORDER — ONDANSETRON HYDROCHLORIDE 8 MG/1
1 CAPSULE TABLET, FILM COATED ORAL
Qty: 120 CAPSULES | Refills: 5 | Status: ACTIVE | COMMUNITY
Start: 2021-09-03 | End: 2022-03-01

## 2021-10-06 RX ORDER — IBUPROFEN 100 MG/1
TAKE 2 TABLETS (200 MG) BY ORAL ROUTE EVERY 6 HOURS AS NEEDED WITH FOOD TABLET, COATED ORAL
Qty: 0 | Refills: 0 | Status: ACTIVE | COMMUNITY
Start: 1900-01-01

## 2021-10-06 RX ORDER — CYANOCOBALAMIN 1000 UG/ML
INJECT 1 ML INTRAMUSCULAR INJECTION INTRAMUSCULAR; SUBCUTANEOUS
Qty: 12 VIALS | Refills: 3 | Status: ACTIVE | COMMUNITY
Start: 2018-07-02 | End: 2022-05-28

## 2021-10-06 RX ORDER — DIPHENOXYLATE HYDROCHLORIDE AND ATROPINE SULFATE 2.5; .025 MG/1; MG/1
TAKE 2 TABLETS TABLET ORAL
Qty: 120 TABLETS | Refills: 11 | Status: ACTIVE | COMMUNITY
Start: 2021-06-02 | End: 2022-05-28

## 2021-10-06 RX ORDER — METHOTREXATE 25 MG/ML
INJECT 1 ML INTO THE MUSCLE EVERY 7 DAYS INJECTION, SOLUTION INTRA-ARTERIAL; INTRAMUSCULAR; INTRATHECAL; INTRAVENOUS
Qty: 4 VIALS | Refills: 6 | Status: ACTIVE | COMMUNITY

## 2021-10-06 RX ORDER — RABEPRAZOLE SODIUM 20 MG/1
TAKE 1 TABLET BY MOUTH TWICE DAILY TABLET, DELAYED RELEASE ORAL
Qty: 180 | Refills: 3 | Status: ACTIVE | COMMUNITY
Start: 2019-02-17

## 2021-10-06 RX ORDER — PREDNISONE 10 MG/1
TAKE 40 MG DAILY TABLET ORAL ONCE A DAY
Status: ACTIVE | COMMUNITY

## 2021-10-06 RX ORDER — VEDOLIZUMAB 300 MG/5ML
INFUSE 300 MG OVER 30 MINUTE(S) BY INTRAVENOUS ROUTE EVERY 4 WEEKS INJECTION, POWDER, LYOPHILIZED, FOR SOLUTION INTRAVENOUS
Qty: 1 | Refills: 11 | Status: ACTIVE | COMMUNITY
Start: 1900-01-01

## 2021-10-06 RX ORDER — LETROZOLE TABLETS 2.5 MG/1
TAKE 1 TABLET (2.5 MG) BY ORAL ROUTE ONCE DAILY TABLET, FILM COATED ORAL 1
Qty: 0 | Refills: 0 | Status: ACTIVE | COMMUNITY
Start: 1900-01-01

## 2021-10-06 RX ORDER — PROMETHAZINE HYDROCHLORIDE 25 MG/1
TAKE 1 TABLETS TABLET ORAL BID
Qty: 60 TABLETS | Refills: 5 | Status: ACTIVE | COMMUNITY
Start: 2021-02-12

## 2021-10-06 RX ORDER — IBUPROFEN 200 MG/1
TAKE 1 CAPSULE (200 MG) BY ORAL ROUTE EVERY 6 HOURS AS NEEDED CAPSULE, LIQUID FILLED ORAL
Qty: 0 | Refills: 0 | Status: ACTIVE | COMMUNITY
Start: 1900-01-01

## 2021-10-06 RX ORDER — DIPHENOXYLATE HCL/ATROPINE 2.5-.025MG
TAKE 2 TABLETS TABLET ORAL TWICE DAILY
Qty: 360 TABLETS | Refills: 3 | Status: ACTIVE | COMMUNITY
End: 2022-06-02

## 2021-10-06 NOTE — HPI-TODAY'S VISIT:
55 yo female with Crohn's and avascular necrosis  and saw Dr. Myerson 2 days ago and he did blood work has some plans - f/u in 3 weeks.  Sees Dr. Gardiner tomorrow to address major ostomy issues.  Considering ostomy revision.  Retracted stoma.  Saw her mother and needed more supplies due to leaking.  Entocort now for 10 weeks, 6 mg a day, empties ostomy 6-9 times.  Has leaked but not in recent weeks.  Never on 9 mg and capri 6 mg for 10 weeks.  Try alternating 6 mg and 3 mg on alternate days.    ========================  21  55 yo female with CD now for 1 month f/u after telehealth and doing better 5 weeks into course of Entocort tx.  Does havd avascular necrosis of hips and understands there are risks of similar complications with Entocort therapy.  Was on fosamax. I just called Dr. Myerson to arrange an appoinment.  Ostomy - empties it 6 times so far today. Occasional abdominal pain.  On Entyvio every 4 weeks Entocort 9 mg a day x 5 weeks.  Asked to have CD4/CD8 checked, by sister's immunologist.   ===================== 8/3/21  55 yo female for  f/u due to beeing weak and in bed. In ER last week and they sent her home.  Meds: Entyvio 300 mg q 4 weeks Entocort  x 1 week Off prednisone - had been on prednisone- she does not think it is what was helping her. On 1 cc mtx per week.  Devin Auguste is primary MD Saw him a month ago.  Main syx now.5 days ago was throwing up and was so distended. Could not poke her stomach. went to the ER Wednesday. N/V. Aciphex 1 twice daily. Last looked  Never had an ulcer. Ileostomy in place.  Fever on Friday, up to 101. Eating now. Hungry yesterday  Not vomiting.  May neede EGD.     ====================== 21  56 yo female comes in for f/u.  She is "off" per Dr. Jaquez.  She did an MRI. Dr. Jaquez reportedly thinks it is her meds. Has not seen a neurologist in years. Recent  labs were nl mostly with Hb 10.1, nl K-4.0 and Ca. Nl ESR and CRP, but had just finished medrol dose eliza.  Wants a Mg level. Labs to be sent to [facility].  Nl quantiferon.  Currently , getting Entyvio every 4 weeks.  Getting entyvio every 4 weeks. Taking 2 weeks off mtx due to stomatitis. If resuming, would reduce from 1 cc to .5 cc..  She is not sure how much she needs mtx and how much it has helped in the past. Mtx has very likely caused the stomatis. Or the pred medrol could have helped the mouth and belly. She has avn all over.  Myerson will see her and order bone scan  Addendum ------21 Has thicker stools Has to change her bag because of high outputs She says her stoma is an outie  She is on pred 20 mg and should taper off it by going to 10 mg to 5 mg for a week and then d/c Risks of prednisone discussed.  Had dropped mtx from 25 to 12.5 and will go back up to 25 mg a week. Consider [place] referral if not improvimg at that point. ============ 21  56 yo female with CD and ostomy for 5 month f/u.  Abd is good. Ostomy is functioning. Sleep thru much of night.  Appetite and intake is better. Oral CD problematic. Has been in touch with Dr. Pepe ---- swish and spit.  Max-- 150. Current weight is 121.  Not taking supplements --  Boost and Ensure. 2-3 cans The y don't agree with her.. R Lactose free ice  She wants low dose prednisone. Lets try 2.5-5 mg a day.  ================== 20  56 yo female for 3 week TH f/u. Has upper abdominal pain that may be due to gallstones. Had referred her to Dr. Moreland but she did not call. She heard he was retiring and not. Has abdominal pain when she. Left upper quadrant or "upper rib cage" mostly left. He knows Dr. Talamantes. -  Frustrated---- blisters on lip. Torn open. Has not tried steroid rinse or paste in past. Magic  mouth wash helps. Does not reduce the blisters. Dr. Best- non steroid options for mouth   ======================== 10/23/20  10/23 56 yo female for 3 week TH f/u. Has oral Crohn's and I-C and stoma issues. Entyvio may not cover the EIM. Might try oral steroid lozenge --- pred paste and lozenges.  Entyvio 300 q 4 weeks Mtx 1 cc q week Pred 5 mg may stop. Bentyl -  Abd pain is ok No idea what triggered her admission Food induced -   Consider medrol dose eliza in the future  Extensive lab review CBC --- Hct 31.6/Hb 10.9 plt 120 CRP 22 ESR 56 Iron 33% Ferrintin good ================================================== 10/2/20  56 yo female with  Crohn's disease recently hosp 3 days at  for abd pain, N/V and got better with 3 dasy 3 days of iv steroids.  Surgery considered.  Was on 40 mg pred - intolerant anxiety  Now dropped to 30 mg a day. Now is 50-60% better than admission.  Surgery debated gallstones. They leaned toward blockage.  Has been on Stelara. Entyvio works better than Stelara. Mtx makes her tired She would like to up the dose of lomotil.  20  53 yo female with Crohnn's disease having continued active syx. Getting Entyvio every 4 weeks. Methotrexate 12.5 mg per week Dicyclomine tablets 2 po qid  == She has taken Xifaxan and it tears her up.   Then stop it. Does not like flagyl and does not help.  Pepto 1-2 doses a day, does help the diarreha. She uses Immodium every day.  Stooling ----- empties bag 6-10 times a day. Abdominal pain - when she eats and intermittent. Energy - gone.  Labs- Anemia - Hb lower CRP is higher ---34 (was 16).   ========================== 20  Crohn's disease.     53 yo female with Crohn's disease comes in for f/u.  Dr. Jaquez is considering a GYN etiology.  Needs a pouch exam.  Getting Entyvio every 4 weeks.  Stelara did not work as well as Entyvio.  Normally when she gets an infusion, she gets a bounce and has not.  Prometheus Vedo level 3/25, just before 4 week dose was great_ __ _32.5 and no antibodies.  Feb 10 labs looked good CRP 29.9 and cbc looked fine.  ESR elevation.  2 liters of miralax_ __ _ Try 6-8am _ __ _- and then an exam at 2-3 pm She asked about a pilonidal cyst and I directed to Dr. Talamantes  Xifaxan  Try pepto bismol 1-2 prn on days.    ===== 20  53 yo female comes in at my request for 3 week f/u of Crohn's due to new 4 x eleation of CRP at 29.9.  19 CRP was similarly elevated 4x at 2.1/.5  Leonid is checking CT abd-pelvis due to pelvic pain which is not characteristic of her Crohn's.  She is wondering about GYN.  Or spastic bladder.  Dr. Talamantes thought stable but wants to scope to be sure.  Empties bag 10x a day.That is more than 3-6 months ago and has leakage of the ostomy bag. Maybe more volume.  Very watery.  Review of labs from 2/10 show cRP 29.i9 but others are stable. CBC looks good with trace elevation of wbc and borderline anemia. Getting iv iron from Arianna Jaquez. Vit D is normal at 39. Albumin is normal at 4.5.  MRI 10/1/19 showed gallstones , present in past, and she has mild pain that is intermittent Liver tests are nl. Can assess with HIDA, and/or with surgical consult, e.g ???relative need for choly, high vs. low.    ================ 20  53 yo female with severe Crohn's disease and h/o breast cancer _ _ Arianna Jaquez MD on Letrasol. 10th year.  Abdominal pain is confusing.  Has gotten pain in the ovary and in the uterus. GYN-Ann Laird MD and did US was fine. Sent to PT for pelvic treatment.  At her comment, I reviewed GB imaging 16 nl CTE 5/3/17 nl GB US Oct 2 2019 "Cholelithiasis"   o/w MRE was nl - no inflammation. BUT THE MRI DID SHOW "PERSISTENT TETHERING OF UTERUS TO RECTAL STUMP"  On Entyvio 300 mg q 4 weeks.  Next dose in 2 weeks On mtx _ _.5 cc. Monthly b12 shots  Eating ok, some weight loss. Weight stable x 6 months and down 30 lb since _ __ _- she is comfortable with current weight.  ============= 19  53 yo female with severe Crohn's and h/o breast cancer comes in for 3 month f/u.  Doing poorly.  Daily abd pain which is intermittent.  When she eats she gets pain and nausea.  Taking zofran and phenergan.  Sleep pattern is awry.  Empties bag 5 times a day and going out of rectum.  Has a loop ileostomy that may be problematic.  Allergic to tape.  Currently on Entyvio 300 mg q 8 weeks.  Stelara did not work as well.  Discussed the entyvio at shorter cycles of q 4 weeks to see if that will control syx better.  Outputs are higher and she may be volume depleted and maybe more entyvio will help.  last entyvio was last week and so a level won't be useful today.  Will just proceed with dose increase.    ============== 18 53 yo female with Crohn's disease and breast cancer and diverting ileostomy from Dr. Talamantes.  Skin issue - addressed by Dr. Best.  See Brooke Talamantes MD and enterostomal therapist.  AGWS.  No fcs.      Review of Systems  ConstitutionalDenies : body aches, fever, weight gain, weight loss  CardiovascularDenies : chest pain, heart racing/skipping, high blood pressure  RespiratoryDenies : chronic cough, shortness of breath, wheezing or asthma symptoms  GastrointestinalDenies : Abdominal Pain/discomfort, Anal/Rectal Pain or Itching, Anal Spasm, Black Stool, Bloating/belching/gaseouness, Change of Bowel Habit, Constipation, Diarrhea/Loose Stool, Difficulty in Swallowing, Heartburn/esophageal reflux, Hemorrhoids, Indigestion, Mucus in Stool, Nausea/vomiting, Rectal Bleeding, Unintentional Weight Loss    Vitals  Date Time BP Position Site L\R Cuff Size HR RR TEMP (F) WT  HT  BMI kg/m2 BSA m2 O2 Sat HC     2020 05:15 PM         133lbs 0oz 5'  8.5" 19.93 1.71          Assessment  Crohn's Disease     555.2  Diarrhea     787.91/R19.7  Cholelithiasis     574.20/K80.20  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 () - - 2020  EGD w/Biopsy if indicated (41417) - - 2020  Pouchoscopy (43715) - - 2020  CMP (comprehensive metabolic panel) (09981) - - 2020  Sed rate (91931) - - 2020  C-reactive protein (71889) - - 2020  Ferritin assay (91980) - - 2020  Iron + iron binding capacity panel ser/plas (40253, 84656) - - 2020  Vitamin B12 and folate measurement (23062, 67241) - - 2020  CBC with diff (93262) - - 2020  25-OH-Vitamin D (65190) - - 2020  GPP 22 (FilmArray) - Gastrointestinal Pathogen Panel - QDx (71062) - - 2020  MedicationsMedications have been Reconciled  Transition of Care or Provider Policy  InstructionsPatient seen today via telehealth by agreement and consent of patient in light of current COVID-19 pandemic. I used video conferencing during the visit. The patient encounter is appropriate and reasonable under the circumstances given the patient's particular presentation at this time. The patient has been advised of the followin) the potential risks and limitations of this mode of treatment (including but not limited to the absence of in-person examination); 2) the right to refuse telehealth services at any point without affecting the right to future care; 3) the right to receive in-person services, included immediately after this consultation if an urgent need arises; 4) information, including identifiable images or information from this telehealth consult, will only be shared in accordance with HIPPA regulations. Any and all of the patient's and/or patient's family member's questions on this issue have been answered. The patient has verbally consented to be treated via telehealth services. The patient has also been advised to contact this office for worsening conditions or problems, and seek emergency medical treatment and/or call 911 if the patient deems either necessary.  (For commercial payers, telehealth video only) More than half of the face-to-face time used for counseling and coordination of care.  40 min appointment (46530 EP)  I have documented a list of current medications and reviewed it with the patient.  I encouraged the patient to diet and exercise.  DispositionReturn To Clinic in 2 Months  CorrespondenceCC this document (Slade Auguste MD, Brooke Talamantes MD, Arianna Jaquez MD) - 2020    24244 exam med 32 min time  needs refill or lomotil Can't eRx lomotil.  Aciphex refill.  Needs EGD and pouchoscopy         Electronically Signed by: MD MIGUEL A Carpio

## 2021-11-12 ENCOUNTER — TELEPHONE ENCOUNTER (OUTPATIENT)
Dept: URBAN - METROPOLITAN AREA CLINIC 98 | Facility: CLINIC | Age: 56
End: 2021-11-12

## 2021-11-12 RX ORDER — DIPHENOXYLATE HCL/ATROPINE 2.5-.025MG
TAKE 2 TABLETS TABLET ORAL TWICE DAILY
Qty: 360 TABLETS | Refills: 1

## 2021-11-12 RX ORDER — RABEPRAZOLE SODIUM 20 MG/1
TAKE 1 TABLET TABLET, DELAYED RELEASE ORAL TWICE DAILY
Qty: 180 TABLETS | Refills: 6
Start: 2019-02-17

## 2021-11-22 ENCOUNTER — TELEPHONE ENCOUNTER (OUTPATIENT)
Dept: URBAN - METROPOLITAN AREA SURGERY CENTER 30 | Facility: SURGERY CENTER | Age: 56
End: 2021-11-22

## 2021-12-03 ENCOUNTER — TELEPHONE ENCOUNTER (OUTPATIENT)
Dept: URBAN - METROPOLITAN AREA CLINIC 98 | Facility: CLINIC | Age: 56
End: 2021-12-03

## 2021-12-14 ENCOUNTER — TELEPHONE ENCOUNTER (OUTPATIENT)
Dept: URBAN - METROPOLITAN AREA CLINIC 92 | Facility: CLINIC | Age: 56
End: 2021-12-14

## 2021-12-14 ENCOUNTER — TELEPHONE ENCOUNTER (OUTPATIENT)
Dept: URBAN - METROPOLITAN AREA CLINIC 98 | Facility: CLINIC | Age: 56
End: 2021-12-14

## 2021-12-14 RX ORDER — PROMETHAZINE HYDROCHLORIDE 25 MG/ML
TAKE 5 ML INJECTION INTRAMUSCULAR; INTRAVENOUS
Qty: 150 ML | Refills: 2 | OUTPATIENT
Start: 2021-12-14 | End: 2022-03-14

## 2021-12-17 ENCOUNTER — TELEPHONE ENCOUNTER (OUTPATIENT)
Dept: URBAN - METROPOLITAN AREA CLINIC 92 | Facility: CLINIC | Age: 56
End: 2021-12-17

## 2021-12-17 ENCOUNTER — TELEPHONE ENCOUNTER (OUTPATIENT)
Dept: URBAN - METROPOLITAN AREA CLINIC 98 | Facility: CLINIC | Age: 56
End: 2021-12-17

## 2021-12-29 ENCOUNTER — LAB OUTSIDE AN ENCOUNTER (OUTPATIENT)
Dept: URBAN - METROPOLITAN AREA CLINIC 98 | Facility: CLINIC | Age: 56
End: 2021-12-29

## 2021-12-29 LAB
ABSOLUTE BASOPHIL COUNT: 0.12
ABSOLUTE LYMPHOCYTE COUNT: 1.22
ABSOLUTE MONOCYTE COUNT: 0.24
ABSOLUTE NEUTROPHIL COUNT (ANC): 4.51
ABSOLUTE NRBC  COUNT: 0
AG RATIO: 1
ALBUMIN LEVEL: 3.8
ALK PHOS: 80
ALT: 7
ANION GAP: 12
ANISOCYTOSIS: (no result)
AST: 10
BASOPHIL MANUAL: 2
BILIRUBIN TOTAL: 0.3
BUN/CREAT RATIO: 11
BUN: 8
CALCIUM LEVEL: 8.8
CHLORIDE LEVEL: 103
CO2 LEVEL: 20
CREATININE LEVEL: 0.8
ELLIPTOCYTE: SLIGHT
GFR AFRICAN AMERICAN: >60
GFR NON AFRICAN AMERICAN: >60
GLUCOSE LEVEL: 89
HCT: 28.6
HGB: 9.4
HYPOCHROMIA: SLIGHT
IPF: 4
LYMPH MAN: 19
MACROCYTOSIS: (no result)
MCH: 39.2
MCHC: 32.9
MCV: 119.2
MICROCYTOSIS: SLIGHT
MONOCYTE MANUAL: 4
MPV: 10.1
NRBC AUTO: 0
OSMO (CALC): 268
PERFORMING LAB: (no result)
PLATELETS: 163
PLT ESTIMATION: ADEQUATE
POIK: SLIGHT
POTASSIUM LEVEL: 4.2
PROTEIN TOTAL: 7.1
RBC MORPH: (no result)
RBC: 2.4
RDW: 15.8
REACT LYMPH MANUAL: 1
SCHISTOCYTES: SLIGHT
SEGS MAN: 74
SLIDE REVIEW: (no result)
SODIUM LEVEL: 135
TEAR DROP CELLS: SLIGHT
WBC: 6.1

## 2022-01-06 ENCOUNTER — OFFICE VISIT (OUTPATIENT)
Dept: URBAN - METROPOLITAN AREA CLINIC 98 | Facility: CLINIC | Age: 57
End: 2022-01-06
Payer: COMMERCIAL

## 2022-01-06 DIAGNOSIS — K50.80 CROHN'S DISEASE OF BOTH SMALL AND LARGE INTESTINE WITHOUT COMPLICATIONS: ICD-10-CM

## 2022-01-06 DIAGNOSIS — R19.7 DIARRHEA: ICD-10-CM

## 2022-01-06 DIAGNOSIS — R10.84 ABDOMINAL PAIN, GENERALIZED: ICD-10-CM

## 2022-01-06 DIAGNOSIS — K80.20 CHOLELITHIASIS: ICD-10-CM

## 2022-01-06 PROCEDURE — 99214 OFFICE O/P EST MOD 30 MIN: CPT | Performed by: INTERNAL MEDICINE

## 2022-01-06 RX ORDER — VEDOLIZUMAB 300 MG/5ML
INFUSE 300 MG OVER 30 MINUTE(S) BY INTRAVENOUS ROUTE EVERY 4 WEEKS INJECTION, POWDER, LYOPHILIZED, FOR SOLUTION INTRAVENOUS
Qty: 1 | Refills: 11 | Status: ACTIVE | COMMUNITY
Start: 1900-01-01

## 2022-01-06 RX ORDER — LETROZOLE TABLETS 2.5 MG/1
TAKE 1 TABLET (2.5 MG) BY ORAL ROUTE ONCE DAILY TABLET, FILM COATED ORAL 1
Qty: 0 | Refills: 0 | Status: ACTIVE | COMMUNITY
Start: 1900-01-01

## 2022-01-06 RX ORDER — METHOTREXATE 2.5 MG/1
TAKE 5 TABLETS TABLET ORAL
Qty: 60 TABLETS | Refills: 5 | Status: ACTIVE | COMMUNITY
Start: 2021-08-13

## 2022-01-06 RX ORDER — METHOTREXATE 25 MG/ML
INJECT 1 ML INTO THE MUSCLE EVERY 7 DAYS INJECTION, SOLUTION INTRA-ARTERIAL; INTRAMUSCULAR; INTRATHECAL; INTRAVENOUS
Qty: 4 VIALS | Refills: 6 | Status: ACTIVE | COMMUNITY

## 2022-01-06 RX ORDER — BUDESONIDE 3 MG/1
2 CAPSULES CAPSULE ORAL ONCE A DAY
Qty: 60 CAPSULES | Refills: 5 | Status: ACTIVE | COMMUNITY
Start: 2021-07-21 | End: 2022-01-16

## 2022-01-06 RX ORDER — IBUPROFEN 100 MG/1
TAKE 2 TABLETS (200 MG) BY ORAL ROUTE EVERY 6 HOURS AS NEEDED WITH FOOD TABLET, COATED ORAL
Qty: 0 | Refills: 0 | Status: ACTIVE | COMMUNITY
Start: 1900-01-01

## 2022-01-06 RX ORDER — DIPHENOXYLATE HYDROCHLORIDE AND ATROPINE SULFATE 2.5; .025 MG/1; MG/1
TAKE 2 TABLETS TABLET ORAL
Qty: 120 TABLETS | Refills: 11 | Status: ACTIVE | COMMUNITY
Start: 2021-06-02 | End: 2022-05-28

## 2022-01-06 RX ORDER — DIPHENOXYLATE HCL/ATROPINE 2.5-.025MG
TAKE 2 TABLETS TABLET ORAL TWICE DAILY
Qty: 360 TABLETS | Refills: 1 | Status: ACTIVE | COMMUNITY

## 2022-01-06 RX ORDER — ALENDRONATE SODIUM 70 MG
TABLET ORAL
Qty: 0 | Refills: 0 | Status: ACTIVE | COMMUNITY
Start: 1900-01-01

## 2022-01-06 RX ORDER — PREDNISONE 10 MG/1
TAKE 40 MG DAILY TABLET ORAL ONCE A DAY
Status: ACTIVE | COMMUNITY

## 2022-01-06 RX ORDER — IBUPROFEN 200 MG/1
TAKE 1 CAPSULE (200 MG) BY ORAL ROUTE EVERY 6 HOURS AS NEEDED CAPSULE, LIQUID FILLED ORAL
Qty: 0 | Refills: 0 | Status: ACTIVE | COMMUNITY
Start: 1900-01-01

## 2022-01-06 RX ORDER — PROMETHAZINE HYDROCHLORIDE 25 MG/ML
TAKE 5 ML INJECTION INTRAMUSCULAR; INTRAVENOUS
Qty: 150 ML | Refills: 2 | Status: ACTIVE | COMMUNITY
Start: 2021-12-14 | End: 2022-03-14

## 2022-01-06 RX ORDER — CYANOCOBALAMIN 1000 UG/ML
INJECT 1 ML INTRAMUSCULAR INJECTION INTRAMUSCULAR; SUBCUTANEOUS
Qty: 12 VIALS | Refills: 3 | Status: ACTIVE | COMMUNITY
Start: 2018-07-02 | End: 2022-05-28

## 2022-01-06 RX ORDER — ONDANSETRON HYDROCHLORIDE 8 MG/1
1 CAPSULE TABLET, FILM COATED ORAL
Qty: 120 CAPSULES | Refills: 5 | Status: ACTIVE | COMMUNITY
Start: 2021-09-03 | End: 2022-03-01

## 2022-01-06 RX ORDER — RABEPRAZOLE SODIUM 20 MG/1
TAKE 1 TABLET BY MOUTH TWICE DAILY TABLET, DELAYED RELEASE ORAL
Qty: 180 | Refills: 3 | Status: ACTIVE | COMMUNITY

## 2022-01-06 RX ORDER — RABEPRAZOLE SODIUM 20 MG/1
TAKE 1 TABLET TABLET, DELAYED RELEASE ORAL TWICE DAILY
Qty: 180 TABLETS | Refills: 6 | Status: ACTIVE | COMMUNITY
Start: 2019-02-17

## 2022-01-06 RX ORDER — PROMETHAZINE HYDROCHLORIDE 25 MG/1
TAKE 1 TABLETS TABLET ORAL BID
Qty: 60 TABLETS | Refills: 5 | Status: ACTIVE | COMMUNITY
Start: 2021-02-12

## 2022-01-06 NOTE — HPI-TODAY'S VISIT:
55 yo female with CD and ileostomy comes in for 3 month f/u visit.  Finally healing from stoma revision 10/15/21.  Stoma never stuck out enough and kept   Dr. Talamantes did a stoma revision.  Mild "complication" as skin around the stoma, stoma had a "moat" around it.  Empties bad 5-6 times. Interval check. Eating ok. Mouth is healing from the leucovorin to lessen the effect of mtx on mouth.  Getting guidance from Myerson.  ===================================== 10/6/21  55 yo female with Crohn's and avascular necrosis  and saw Dr. Myerson 2 days ago and he did blood work has some plans - f/u in 3 weeks.  Sees Dr. Gardiner tomorrow to address major ostomy issues.  Considering ostomy revision.  Retracted stoma.  Saw her mother and needed more supplies due to leaking.  Entocort now for 10 weeks, 6 mg a day, empties ostomy 6-9 times.  Has leaked but not in recent weeks.  Never on 9 mg and capri 6 mg for 10 weeks.  Try alternating 6 mg and 3 mg on alternate days.    ========================  21  55 yo female with CD now for 1 month f/u after telehealth and doing better 5 weeks into course of Entocort tx.  Does havd avascular necrosis of hips and understands there are risks of similar complications with Entocort therapy.  Was on fosamax. I just called Dr. Myerson to arrange an appoinment.  Ostomy - empties it 6 times so far today. Occasional abdominal pain.  On Entyvio every 4 weeks Entocort 9 mg a day x 5 weeks.  Asked to have CD4/CD8 checked, by sister's immunologist.   ===================== 8/3/21  55 yo female for  f/u due to beeing weak and in bed. In ER last week and they sent her home.  Meds: Entyvio 300 mg q 4 weeks Entocort  x 1 week Off prednisone - had been on prednisone- she does not think it is what was helping her. On 1 cc mtx per week.  Devin Auguste is primary MD Saw him a month ago.  Main syx now.5 days ago was throwing up and was so distended. Could not poke her stomach. went to the ER Wednesday. N/V. Aciphex 1 twice daily. Last looked  Never had an ulcer. Ileostomy in place.  Fever on Friday, up to 101. Eating now. Hungry yesterday  Not vomiting.  May neede EGD.     ====================== 21  54 yo female comes in for f/u.  She is "off" per Dr. Jaquez.  She did an MRI. Dr. Jaquez reportedly thinks it is her meds. Has not seen a neurologist in years. Recent  labs were nl mostly with Hb 10.1, nl K-4.0 and Ca. Nl ESR and CRP, but had just finished medrol dose eliza.  Wants a Mg level. Labs to be sent to [facility].  Nl quantiferon.  Currently , getting Entyvio every 4 weeks.  Getting entyvio every 4 weeks. Taking 2 weeks off mtx due to stomatitis. If resuming, would reduce from 1 cc to .5 cc..  She is not sure how much she needs mtx and how much it has helped in the past. Mtx has very likely caused the stomatis. Or the pred medrol could have helped the mouth and belly. She has avn all over.  Myerson will see her and order bone scan  Addendum ------21 Has thicker stools Has to change her bag because of high outputs She says her stoma is an outie  She is on pred 20 mg and should taper off it by going to 10 mg to 5 mg for a week and then d/c Risks of prednisone discussed.  Had dropped mtx from 25 to 12.5 and will go back up to 25 mg a week. Consider [place] referral if not improvimg at that point. ============ 21  54 yo female with CD and ostomy for 5 month f/u.  Abd is good. Ostomy is functioning. Sleep thru much of night.  Appetite and intake is better. Oral CD problematic. Has been in touch with Dr. Pepe ---- swish and spit.  Max-- 150. Current weight is 121.  Not taking supplements --  Boost and Ensure. 2-3 cans The y don't agree with her.. R Lactose free ice  She wants low dose prednisone. Lets try 2.5-5 mg a day.  ================== 20  54 yo female for 3 week TH f/u. Has upper abdominal pain that may be due to gallstones. Had referred her to Dr. Moreland but she did not call. She heard he was retiring and not. Has abdominal pain when she. Left upper quadrant or "upper rib cage" mostly left. He knows Dr. Talamantes. -  Frustrated---- blisters on lip. Torn open. Has not tried steroid rinse or paste in past. Magic  mouth wash helps. Does not reduce the blisters. Dr. Best- non steroid options for mouth   ======================== 10/23/20  10/23 54 yo female for 3 week  f/u. Has oral Crohn's and I-C and stoma issues. Entyvio may not cover the EIM. Might try oral steroid lozenge --- pred paste and lozenges.  Entyvio 300 q 4 weeks Mtx 1 cc q week Pred 5 mg may stop. Bentyl -  Abd pain is ok No idea what triggered her admission Food induced -   Consider medrol dose eliza in the future  Extensive lab review CBC --- Hct 31.6/Hb 10.9 plt 120 CRP 22 ESR 56 Iron 33% Ferrintin good ================================================== 10/2/20  54 yo female with  Crohn's disease recently hosp 3 days at  for abd pain, N/V and got better with 3 dasy 3 days of iv steroids.  Surgery considered.  Was on 40 mg pred - intolerant anxiety  Now dropped to 30 mg a day. Now is 50-60% better than admission.  Surgery debated gallstones. They leaned toward blockage.  Has been on Stelara. Entyvio works better than Stelara. Mtx makes her tired She would like to up the dose of lomotil.  20  53 yo female with Crohnn's disease having continued active syx. Getting Entyvio every 4 weeks. Methotrexate 12.5 mg per week Dicyclomine tablets 2 po qid  == She has taken Xifaxan and it tears her up.   Then stop it. Does not like flagyl and does not help.  Pepto 1-2 doses a day, does help the diarreha. She uses Immodium every day.  Stooling ----- empties bag 6-10 times a day. Abdominal pain - when she eats and intermittent. Energy - gone.  Labs- Anemia - Hb lower CRP is higher ---34 (was 16).   ========================== 20  Crohn's disease.     53 yo female with Crohn's disease comes in for f/u.  Dr. Jaquez is considering a GYN etiology.  Needs a pouch exam.  Getting Entyvio every 4 weeks.  Stelara did not work as well as Entyvio.  Normally when she gets an infusion, she gets a bounce and has not.  [employer] Vedo level 3/25, just before 4 week dose was great_ __ _32.5 and no antibodies.  Feb 10 labs looked good CRP 29.9 and cbc looked fine.  ESR elevation.  2 liters of miralax_ __ _ Try 6-8am _ __ _- and then an exam at 2-3 pm She asked about a pilonidal cyst and I directed to Dr. Talamantes  Xifaxan  Try pepto bismol 1-2 prn on days.    ===== 20  53 yo female comes in at my request for 3 week f/u of Crohn's due to new 4 x eleation of CRP at 29.9.  19 CRP was similarly elevated 4x at 2.1/.5  Leonid is checking CT abd-pelvis due to pelvic pain which is not characteristic of her Crohn's.  She is wondering about GYN.  Or spastic bladder.  Dr. Talamantes thought stable but wants to scope to be sure.  Empties bag 10x a day.That is more than 3-6 months ago and has leakage of the ostomy bag. Maybe more volume.  Very watery.  Review of labs from 2/10 show cRP 29.i9 but others are stable. CBC looks good with trace elevation of wbc and borderline anemia. Getting iv iron from Arianna Jaquez. Vit D is normal at 39. Albumin is normal at 4.5.  MRI 10/1/19 showed gallstones , present in past, and she has mild pain that is intermittent Liver tests are nl. Can assess with HIDA, and/or with surgical consult, e.g ???relative need for choly, high vs. low.    ================ 20  53 yo female with severe Crohn's disease and h/o breast cancer _ _ Arianna Jaquez MD on Letrasol. 10th year.  Abdominal pain is confusing.  Has gotten pain in the ovary and in the uterus. GYN-Ann Laird MD and did US was fine. Sent to PT for pelvic treatment.  At her comment, I reviewed [facility] imaging 16 nl CTE 5/3/17 nl GB US Oct 2 2019 "Cholelithiasis"   o/w MRE was nl - no inflammation. BUT THE MRI DID SHOW "PERSISTENT TETHERING OF UTERUS TO RECTAL STUMP"  On Entyvio 300 mg q 4 weeks.  Next dose in 2 weeks On mtx _ _.5 cc. Monthly b12 shots  Eating ok, some weight loss. Weight stable x 6 months and down 30 lb since _ __ _- she is comfortable with current weight.  ============= 19  53 yo female with severe Crohn's and h/o breast cancer comes in for 3 month f/u.  Doing poorly.  Daily abd pain which is intermittent.  When she eats she gets pain and nausea.  Taking zofran and phenergan.  Sleep pattern is awry.  Empties bag 5 times a day and going out of rectum.  Has a loop ileostomy that may be problematic.  Allergic to tape.  Currently on Entyvio 300 mg q 8 weeks.  Stelara did not work as well.  Discussed the entyvio at shorter cycles of q 4 weeks to see if that will control syx better.  Outputs are higher and she may be volume depleted and maybe more entyvio will help.  last entyvio was last week and so a level won't be useful today.  Will just proceed with dose increase.    ============== 18 51 yo female with Crohn's disease and breast cancer and diverting ileostomy from Dr. Talamantes.  Skin issue - addressed by Dr. Best.  See Brooke Talamantes MD and enterostomal therapist.  AGWS.  No fcs.      Review of Systems  ConstitutionalDenies : body aches, fever, weight gain, weight loss  CardiovascularDenies : chest pain, heart racing/skipping, high blood pressure  RespiratoryDenies : chronic cough, shortness of breath, wheezing or asthma symptoms  GastrointestinalDenies : Abdominal Pain/discomfort, Anal/Rectal Pain or Itching, Anal Spasm, Black Stool, Bloating/belching/gaseouness, Change of Bowel Habit, Constipation, Diarrhea/Loose Stool, Difficulty in Swallowing, Heartburn/esophageal reflux, Hemorrhoids, Indigestion, Mucus in Stool, Nausea/vomiting, Rectal Bleeding, Unintentional Weight Loss    Vitals  Date Time BP Position Site L\R Cuff Size HR RR TEMP (F) WT  HT  BMI kg/m2 BSA m2 O2 Sat HC     2020 05:15 PM         133lbs 0oz 5'  8.5" 19.93 1.71          Assessment  Crohn's Disease     555.2  Diarrhea     787.91/R19.7  Cholelithiasis     574.20/K80.20  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 () - - 2020  EGD w/Biopsy if indicated (86065) - - 2020  Pouchoscopy (42057) - - 2020  CMP (comprehensive metabolic panel) (33698) - - 2020  Sed rate (38908) - - 2020  C-reactive protein (84935) - - 2020  Ferritin assay (80220) - - 2020  Iron + iron binding capacity panel ser/plas (55226, 64435) - - 2020  Vitamin B12 and folate measurement (29134, 63828) - - 2020  CBC with diff (46602) - - 2020  25-OH-Vitamin D (72165) - - 2020  GPP 22 (FilmArray) - Gastrointestinal Pathogen Panel - QDx (99017) - - 2020  MedicationsMedications have been Reconciled  Transition of Care or Provider Policy  InstructionsPatient seen today via telehealth by agreement and consent of patient in light of current COVID-19 pandemic. I used video conferencing during the visit. The patient encounter is appropriate and reasonable under the circumstances given the patient's particular presentation at this time. The patient has been advised of the followin) the potential risks and limitations of this mode of treatment (including but not limited to the absence of in-person examination); 2) the right to refuse telehealth services at any point without affecting the right to future care; 3) the right to receive in-person services, included immediately after this consultation if an urgent need arises; 4) information, including identifiable images or information from this telehealth consult, will only be shared in accordance with HIPPA regulations. Any and all of the patient's and/or patient's family member's questions on this issue have been answered. The patient has verbally consented to be treated via telehealth services. The patient has also been advised to contact this office for worsening conditions or problems, and seek emergency medical treatment and/or call 911 if the patient deems either necessary.  (For commercial payers, telehealth video only) More than half of the face-to-face time used for counseling and coordination of care.  40 min appointment (71783 EP)  I have documented a list of current medications and reviewed it with the patient.  I encouraged the patient to diet and exercise.  DispositionReturn To Clinic in 2 Months  CorrespondenceCC this document (Slade Auguste MD, Brooke Talamantes MD, Arianna Jaquez MD) - 2020    64637 exam med 32 min time  needs refill or lomotil Can't eRx lomotil.  Aciphex refill.  Needs EGD and pouchoscopy         Electronically Signed by: MD MIGUEL A Carpio

## 2022-01-07 LAB
A/G RATIO: 1.6
ALBUMIN: 4.3
ALKALINE PHOSPHATASE: 82
ALT (SGPT): 5
AST (SGOT): 7
BASO (ABSOLUTE): 0
BASOS: 1
BILIRUBIN, TOTAL: <0.2
BUN/CREATININE RATIO: 14
BUN: 11
C-REACTIVE PROTEIN, QUANT: 13
CALCIUM: 9.1
CARBON DIOXIDE, TOTAL: 21
CHLORIDE: 103
CREATININE: 0.79
EGFR IF AFRICN AM: 97
EGFR IF NONAFRICN AM: 85
EOS (ABSOLUTE): 0.1
EOS: 2
FERRITIN, SERUM: 1322
FOLATE (FOLIC ACID), SERUM: >20
GLOBULIN, TOTAL: 2.7
GLUCOSE: 90
HEMATOCRIT: 27.7
HEMATOLOGY COMMENTS:: (no result)
HEMOGLOBIN: 9.4
IMMATURE CELLS: (no result)
IMMATURE GRANS (ABS): 0
IMMATURE GRANULOCYTES: 0
IRON BIND.CAP.(TIBC): 213
IRON SATURATION: 40
IRON: 86
LYMPHS (ABSOLUTE): 1.1
LYMPHS: 16
MCH: 38.4
MCHC: 33.9
MCV: 113
MONOCYTES(ABSOLUTE): 0.5
MONOCYTES: 7
NEUTROPHILS (ABSOLUTE): 5.1
NEUTROPHILS: 74
NRBC: (no result)
PLATELETS: 183
POTASSIUM: 4.4
PROTEIN, TOTAL: 7
RBC: 2.45
RDW: 14.9
SEDIMENTATION RATE-WESTERGREN: 51
SODIUM: 138
UIBC: 127
VITAMIN B12: 883
WBC: 6.9

## 2022-01-12 ENCOUNTER — TELEPHONE ENCOUNTER (OUTPATIENT)
Dept: URBAN - METROPOLITAN AREA CLINIC 98 | Facility: CLINIC | Age: 57
End: 2022-01-12

## 2022-01-12 RX ORDER — VITAMIN A 2400 MCG
1 TABLET CAPSULE ORAL ONCE A DAY
Qty: 90 TABLET | Refills: 1 | OUTPATIENT
Start: 2022-01-12

## 2022-01-24 ENCOUNTER — LAB OUTSIDE AN ENCOUNTER (OUTPATIENT)
Dept: URBAN - METROPOLITAN AREA CLINIC 98 | Facility: CLINIC | Age: 57
End: 2022-01-24

## 2022-01-25 ENCOUNTER — TELEPHONE ENCOUNTER (OUTPATIENT)
Dept: URBAN - METROPOLITAN AREA CLINIC 98 | Facility: CLINIC | Age: 57
End: 2022-01-25

## 2022-01-31 LAB
A/G RATIO: 1.6
ALBUMIN: 4.2
ALKALINE PHOSPHATASE: 87
ALT (SGPT): 6
ANTI-VEDOLIZUMAB ANTIBODY: <25
AST (SGOT): 11
BASO (ABSOLUTE): 0.1
BASOS: 1
BILIRUBIN, TOTAL: 0.2
BUN/CREATININE RATIO: 20
BUN: 19
C-REACTIVE PROTEIN, QUANT: 10
CALCIUM: 8.9
CARBON DIOXIDE, TOTAL: 19
CHLORIDE: 104
CREATININE: 0.93
EGFR IF AFRICN AM: 80
EGFR IF NONAFRICN AM: 69
EOS (ABSOLUTE): 0.1
EOS: 2
GLOBULIN, TOTAL: 2.7
GLUCOSE: 99
HEMATOCRIT: 29
HEMATOLOGY COMMENTS:: (no result)
HEMOGLOBIN: 10.2
IMMATURE CELLS: (no result)
IMMATURE GRANS (ABS): 0
IMMATURE GRANULOCYTES: 1
LYMPHS (ABSOLUTE): 1.5
LYMPHS: 27
MCH: 39.7
MCHC: 35.2
MCV: 113
MONOCYTES(ABSOLUTE): 0.4
MONOCYTES: 7
NEUTROPHILS (ABSOLUTE): 3.6
NEUTROPHILS: 62
NRBC: (no result)
PLATELETS: 184
POTASSIUM: 3.7
PROTEIN, TOTAL: 6.9
RBC: 2.57
RDW: 14.4
SEDIMENTATION RATE-WESTERGREN: 31
SODIUM: 137
VEDOLIZUMAB: 31
WBC: 5.8

## 2022-02-02 ENCOUNTER — TELEPHONE ENCOUNTER (OUTPATIENT)
Dept: URBAN - METROPOLITAN AREA CLINIC 98 | Facility: CLINIC | Age: 57
End: 2022-02-02

## 2022-02-08 ENCOUNTER — TELEPHONE ENCOUNTER (OUTPATIENT)
Dept: URBAN - METROPOLITAN AREA CLINIC 98 | Facility: CLINIC | Age: 57
End: 2022-02-08

## 2022-02-08 ENCOUNTER — OFFICE VISIT (OUTPATIENT)
Dept: URBAN - METROPOLITAN AREA CLINIC 98 | Facility: CLINIC | Age: 57
End: 2022-02-08
Payer: COMMERCIAL

## 2022-02-08 DIAGNOSIS — R19.7 DIARRHEA: ICD-10-CM

## 2022-02-08 DIAGNOSIS — Z79.899 IMMUNOSUPPRESSION DUE TO DRUG THERAPY: ICD-10-CM

## 2022-02-08 DIAGNOSIS — R10.84 ABDOMINAL PAIN, GENERALIZED: ICD-10-CM

## 2022-02-08 DIAGNOSIS — K50.80 CROHN'S DISEASE OF BOTH SMALL AND LARGE INTESTINE WITHOUT COMPLICATIONS: ICD-10-CM

## 2022-02-08 DIAGNOSIS — K50.90 CROHN DISEASE: ICD-10-CM

## 2022-02-08 PROCEDURE — 99215 OFFICE O/P EST HI 40 MIN: CPT | Performed by: INTERNAL MEDICINE

## 2022-02-08 RX ORDER — PROMETHAZINE HYDROCHLORIDE 25 MG/ML
TAKE 5 ML INJECTION INTRAMUSCULAR; INTRAVENOUS
Qty: 150 ML | Refills: 2 | Status: ACTIVE | COMMUNITY
Start: 2021-12-14 | End: 2022-03-14

## 2022-02-08 RX ORDER — PROMETHAZINE HYDROCHLORIDE 25 MG/1
TAKE 1 TABLETS TABLET ORAL BID
Qty: 60 TABLETS | Refills: 5 | Status: ACTIVE | COMMUNITY
Start: 2021-02-12

## 2022-02-08 RX ORDER — METHOTREXATE 2.5 MG/1
TAKE 5 TABLETS TABLET ORAL
Qty: 60 TABLETS | Refills: 5 | Status: ACTIVE | COMMUNITY
Start: 2021-08-13

## 2022-02-08 RX ORDER — VEDOLIZUMAB 300 MG/5ML
INFUSE 300 MG OVER 30 MINUTE(S) BY INTRAVENOUS ROUTE EVERY 4 WEEKS INJECTION, POWDER, LYOPHILIZED, FOR SOLUTION INTRAVENOUS
Qty: 1 | Refills: 11 | Status: ACTIVE | COMMUNITY
Start: 1900-01-01

## 2022-02-08 RX ORDER — PREDNISONE 10 MG/1
TAKE 40 MG DAILY TABLET ORAL ONCE A DAY
Status: ACTIVE | COMMUNITY

## 2022-02-08 RX ORDER — DIPHENOXYLATE HYDROCHLORIDE AND ATROPINE SULFATE 2.5; .025 MG/1; MG/1
TAKE 2 TABLETS TABLET ORAL
Qty: 120 TABLETS | Refills: 11 | Status: ACTIVE | COMMUNITY
Start: 2021-06-02 | End: 2022-05-28

## 2022-02-08 RX ORDER — ONDANSETRON HYDROCHLORIDE 8 MG/1
1 CAPSULE TABLET, FILM COATED ORAL
Qty: 120 CAPSULES | Refills: 5 | Status: ACTIVE | COMMUNITY
Start: 2021-09-03 | End: 2022-03-01

## 2022-02-08 RX ORDER — VITAMIN A 2400 MCG
1 TABLET CAPSULE ORAL ONCE A DAY
Qty: 90 TABLET | Refills: 1 | Status: ACTIVE | COMMUNITY
Start: 2022-01-12

## 2022-02-08 RX ORDER — RABEPRAZOLE SODIUM 20 MG/1
TAKE 1 TABLET BY MOUTH TWICE DAILY TABLET, DELAYED RELEASE ORAL
Qty: 180 | Refills: 3 | Status: ACTIVE | COMMUNITY

## 2022-02-08 RX ORDER — CYANOCOBALAMIN 1000 UG/ML
INJECT 1 ML INTRAMUSCULAR INJECTION INTRAMUSCULAR; SUBCUTANEOUS
Qty: 12 VIALS | Refills: 3 | Status: ACTIVE | COMMUNITY
Start: 2018-07-02 | End: 2022-05-28

## 2022-02-08 RX ORDER — VEDOLIZUMAB 300 MG/5ML
AS DIRECTED INJECTION, POWDER, LYOPHILIZED, FOR SOLUTION INTRAVENOUS
Qty: 300 MILLIGRAMS | Refills: 0 | OUTPATIENT
Start: 2022-02-08 | End: 2022-05-09

## 2022-02-08 RX ORDER — DIPHENOXYLATE HCL/ATROPINE 2.5-.025MG
TAKE 2 TABLETS TABLET ORAL TWICE DAILY
Qty: 360 TABLETS | Refills: 1 | Status: ACTIVE | COMMUNITY

## 2022-02-08 RX ORDER — RABEPRAZOLE SODIUM 20 MG/1
TAKE 1 TABLET TABLET, DELAYED RELEASE ORAL TWICE DAILY
Qty: 180 TABLETS | Refills: 6 | Status: ACTIVE | COMMUNITY
Start: 2019-02-17

## 2022-02-08 RX ORDER — IBUPROFEN 100 MG/1
TAKE 2 TABLETS (200 MG) BY ORAL ROUTE EVERY 6 HOURS AS NEEDED WITH FOOD TABLET, COATED ORAL
Qty: 0 | Refills: 0 | Status: ACTIVE | COMMUNITY
Start: 1900-01-01

## 2022-02-08 RX ORDER — METHOTREXATE 25 MG/ML
INJECT 1 ML INTO THE MUSCLE EVERY 7 DAYS INJECTION, SOLUTION INTRA-ARTERIAL; INTRAMUSCULAR; INTRATHECAL; INTRAVENOUS
Qty: 4 VIALS | Refills: 6 | Status: ACTIVE | COMMUNITY

## 2022-02-08 RX ORDER — IBUPROFEN 200 MG/1
TAKE 1 CAPSULE (200 MG) BY ORAL ROUTE EVERY 6 HOURS AS NEEDED CAPSULE, LIQUID FILLED ORAL
Qty: 0 | Refills: 0 | Status: ACTIVE | COMMUNITY
Start: 1900-01-01

## 2022-02-08 RX ORDER — ALENDRONATE SODIUM 70 MG
TABLET ORAL
Qty: 0 | Refills: 0 | Status: ACTIVE | COMMUNITY
Start: 1900-01-01

## 2022-02-08 RX ORDER — LETROZOLE TABLETS 2.5 MG/1
TAKE 1 TABLET (2.5 MG) BY ORAL ROUTE ONCE DAILY TABLET, FILM COATED ORAL 1
Qty: 0 | Refills: 0 | Status: ACTIVE | COMMUNITY
Start: 1900-01-01

## 2022-02-08 NOTE — HPI-TODAY'S VISIT:
55 yo female returns for 5 week f/u. Stoma is fully healed. No issues with the appliances. Emptying bag 5-6 times a day and coping.  She has been having abdominal pain for the past few weeks. It felt like someone was "trying to pull the uterus out" and she calls it scar tissue. There was nothing on US, to cause the pain. MRE from 21 showed ??problable low grade partial SBO but this may be dysfunction.  She came to see me for the iron deficiency anemia. She has noticed some bleeding.  She still has urgency to move her bowels. Yesterday was scary becasue she saw blood in the rectal fluid. Passes rectal fluid 8-10 times a day. At night, it can be horrendous. Bottom of diaper is full.  On Entyvio 300 mg iv q 4 Methotrexate 1 cc q week Budesonide -- 3m 3 per day.  -Was feeling bad 6 months ago or so and Bud was started. May be worth trying to taper   ============================= 22  55 yo female with CD and ileostomy comes in for 3 month f/u visit.  Finally healing from stoma revision 10/15/21.  Stoma never stuck out enough and kept   Dr. Talamantes did a stoma revision.  Mild "complication" as skin around the stoma, stoma had a "moat" around it.  Empties bag 5-6 times. Interval check. Eating ok. Mouth is healing from the leucovorin to lessen the effect of mtx on mouth.  Getting guidance from Myerson.  ===================================== 10/6/21  55 yo female with Crohn's and avascular necrosis  and saw Dr. Myerson 2 days ago and he did blood work has some plans - f/u in 3 weeks.  Sees Dr. Gardiner tomorrow to address major ostomy issues.  Considering ostomy revision.  Retracted stoma.  Saw her mother and needed more supplies due to leaking.  Entocort now for 10 weeks, 6 mg a day, empties ostomy 6-9 times.  Has leaked but not in recent weeks.  Never on 9 mg and capri 6 mg for 10 weeks.  Try alternating 6 mg and 3 mg on alternate days.    ========================  21  55 yo female with CD now for 1 month f/u after telehealth and doing better 5 weeks into course of Entocort tx.  Does havd avascular necrosis of hips and understands there are risks of similar complications with Entocort therapy.  Was on fosamax. I just called Dr. Myerson to arrange an appoinment.  Ostomy - empties it 6 times so far today. Occasional abdominal pain.  On Entyvio every 4 weeks Entocort 9 mg a day x 5 weeks.  Asked to have CD4/CD8 checked, by sister's immunologist.   ===================== 8/3/21  55 yo female for  f/u due to beeing weak and in bed. In ER last week and they sent her home.  Meds: Entyvio 300 mg q 4 weeks Entocort  x 1 week Off prednisone - had been on prednisone- she does not think it is what was helping her. On 1 cc mtx per week.  Devin Auguste is primary MD Saw him a month ago.  Main syx now.5 days ago was throwing up and was so distended. Could not poke her stomach. went to the ER Wednesday. N/V. Aciphex 1 twice daily. Last looked  Never had an ulcer. Ileostomy in place.  Fever on Friday, up to 101. Eating now. Hungry yesterday  Not vomiting.  May neede EGD.     ====================== 21  54 yo female comes in for f/u.  She is "off" per Dr. Jaquez.  She did an MRI. Dr. Jaquez reportedly thinks it is her meds. Has not seen a neurologist in years. Recent  labs were nl mostly with Hb 10.1, nl K-4.0 and Ca. Nl ESR and CRP, but had just finished medrol dose eliza.  Wants a Mg level. Labs to be sent to [facility].  Nl quantiferon.  Currently , getting Entyvio every 4 weeks.  Getting entyvio every 4 weeks. Taking 2 weeks off mtx due to stomatitis. If resuming, would reduce from 1 cc to .5 cc..  She is not sure how much she needs mtx and how much it has helped in the past. Mtx has very likely caused the stomatis. Or the pred medrol could have helped the mouth and belly. She has avn all over.  Myerson will see her and order bone scan  Addendum ------21 Has thicker stools Has to change her bag because of high outputs She says her stoma is an outie  She is on pred 20 mg and should taper off it by going to 10 mg to 5 mg for a week and then d/c Risks of prednisone discussed.  Had dropped mtx from 25 to 12.5 and will go back up to 25 mg a week. Consider [place] referral if not improvimg at that point. ============ 21  54 yo female with CD and ostomy for 5 month f/u.  Abd is good. Ostomy is functioning. Sleep thru much of night.  Appetite and intake is better. Oral CD problematic. Has been in touch with Dr. Pepe ---- swish and spit.  Max-- 150. Current weight is 121.  Not taking supplements --  Boost and Ensure. 2-3 cans The y don't agree with her.. R Lactose free ice  She wants low dose prednisone. Lets try 2.5-5 mg a day.  ================== 20  54 yo female for 3 week TH f/u. Has upper abdominal pain that may be due to gallstones. Had referred her to Dr. Moreland but she did not call. She heard he was retiring and not. Has abdominal pain when she. Left upper quadrant or "upper rib cage" mostly left. He knows Dr. Talamantes. -  Frustrated---- blisters on lip. Torn open. Has not tried steroid rinse or paste in past. Magic  mouth wash helps. Does not reduce the blisters. Dr. Best- non steroid options for mouth   ======================== 10/23/20  10/23 54 yo female for 3 week TH f/u. Has oral Crohn's and I-C and stoma issues. Entyvio may not cover the EIM. Might try oral steroid lozenge --- pred paste and lozenges.  Entyvio 300 q 4 weeks Mtx 1 cc q week Pred 5 mg may stop. Bentyl -  Abd pain is ok No idea what triggered her admission Food induced -   Consider medrol dose elzia in the future  Extensive lab review CBC --- Hct 31.6/Hb 10.9 plt 120 CRP 22 ESR 56 Iron 33% Ferrintin good ================================================== 10/2/20  54 yo female with  Crohn's disease recently hosp 3 days at  for abd pain, N/V and got better with 3 dasy 3 days of iv steroids.  Surgery considered.  Was on 40 mg pred - intolerant anxiety  Now dropped to 30 mg a day. Now is 50-60% better than admission.  Surgery debated gallstones. They leaned toward blockage.  Has been on Stelara. Entyvio works better than Stelara. Mtx makes her tired She would like to up the dose of lomotil.  20  55 yo female with Crohnn's disease having continued active syx. Getting Entyvio every 4 weeks. Methotrexate 12.5 mg per week Dicyclomine tablets 2 po qid  == She has taken Xifaxan and it tears her up.   Then stop it. Does not like flagyl and does not help.  Pepto 1-2 doses a day, does help the diarreha. She uses Immodium every day.  Stooling ----- empties bag 6-10 times a day. Abdominal pain - when she eats and intermittent. Energy - gone.  Labs- Anemia - Hb lower CRP is higher ---34 (was 16).   ========================== 20  Crohn's disease.     55 yo female with Crohn's disease comes in for f/u.  Dr. Jaquez is considering a GYN etiology.  Needs a pouch exam.  Getting Entyvio every 4 weeks.  Stelara did not work as well as Entyvio.  Normally when she gets an infusion, she gets a bounce and has not.  [employer] Vedo level 3/25, just before 4 week dose was great_ __ _32.5 and no antibodies.  Feb 10 labs looked good CRP 29.9 and cbc looked fine.  ESR elevation.  2 liters of miralax_ __ _ Try 6-8am _ __ _- and then an exam at 2-3 pm She asked about a pilonidal cyst and I directed to Dr. Talamantes  Xifaxan  Try pepto bismol 1-2 prn on days.    ===== 20  55 yo female comes in at my request for 3 week f/u of Crohn's due to new 4 x eleation of CRP at 29.9.  19 CRP was similarly elevated 4x at 2.1/.5  Leonid is checking CT abd-pelvis due to pelvic pain which is not characteristic of her Crohn's.  She is wondering about GYN.  Or spastic bladder.  Dr. Talamantes thought stable but wants to scope to be sure.  Empties bag 10x a day.That is more than 3-6 months ago and has leakage of the ostomy bag. Maybe more volume.  Very watery.  Review of labs from 2/10 show cRP 29.i9 but others are stable. CBC looks good with trace elevation of wbc and borderline anemia. Getting iv iron from Arianna Jaquez. Vit D is normal at 39. Albumin is normal at 4.5.  MRI 10/1/19 showed gallstones , present in past, and she has mild pain that is intermittent Liver tests are nl. Can assess with HIDA, and/or with surgical consult, e.g ???relative need for choly, high vs. low.    ================ 20  55 yo female with severe Crohn's disease and h/o breast cancer _ _ Arianna Jaquez MD on Letrasol. 10th year.  Abdominal pain is confusing.  Has gotten pain in the ovary and in the uterus. GYN-Ann Laird MD and did US was fine. Sent to PT for pelvic treatment.  At her comment, I reviewed [facility] imaging 16 nl CTE 5/3/17 nl GB US Oct 2 2019 "Cholelithiasis"   o/w MRE was nl - no inflammation. BUT THE MRI DID SHOW "PERSISTENT TETHERING OF UTERUS TO RECTAL STUMP"  On Entyvio 300 mg q 4 weeks.  Next dose in 2 weeks On mtx _ _.5 cc. Monthly b12 shots  Eating ok, some weight loss. Weight stable x 6 months and down 30 lb since _ __ _- she is comfortable with current weight.  ============= 19  55 yo female with severe Crohn's and h/o breast cancer comes in for 3 month f/u.  Doing poorly.  Daily abd pain which is intermittent.  When she eats she gets pain and nausea.  Taking zofran and phenergan.  Sleep pattern is awry.  Empties bag 5 times a day and going out of rectum.  Has a loop ileostomy that may be problematic.  Allergic to tape.  Currently on Entyvio 300 mg q 8 weeks.  Stelara did not work as well.  Discussed the entyvio at shorter cycles of q 4 weeks to see if that will control syx better.  Outputs are higher and she may be volume depleted and maybe more entyvio will help.  last entyvio was last week and so a level won't be useful today.  Will just proceed with dose increase.    ============== 18 53 yo female with Crohn's disease and breast cancer and diverting ileostomy from Dr. Talamantes.  Skin issue - addressed by Dr. Best.  See Brooke Talamantes MD and enterostomal therapist.  AGWS.  No fcs.      Review of Systems  ConstitutionalDenies : body aches, fever, weight gain, weight loss  CardiovascularDenies : chest pain, heart racing/skipping, high blood pressure  RespiratoryDenies : chronic cough, shortness of breath, wheezing or asthma symptoms  GastrointestinalDenies : Abdominal Pain/discomfort, Anal/Rectal Pain or Itching, Anal Spasm, Black Stool, Bloating/belching/gaseouness, Change of Bowel Habit, Constipation, Diarrhea/Loose Stool, Difficulty in Swallowing, Heartburn/esophageal reflux, Hemorrhoids, Indigestion, Mucus in Stool, Nausea/vomiting, Rectal Bleeding, Unintentional Weight Loss    Vitals  Date Time BP Position Site L\R Cuff Size HR RR TEMP (F) WT  HT  BMI kg/m2 BSA m2 O2 Sat HC     2020 05:15 PM         133lbs 0oz 5'  8.5" 19.93 1.71          Assessment  Crohn's Disease     555.2  Diarrhea     787.91/R19.7  Cholelithiasis     574.20/K80.20  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 () - - 2020  EGD w/Biopsy if indicated (41123) - - 2020  Pouchoscopy (01097) - - 2020  CMP (comprehensive metabolic panel) (62152) - - 2020  Sed rate (20411) - - 2020  C-reactive protein (05964) - - 2020  Ferritin assay (63147) - - 2020  Iron + iron binding capacity panel ser/plas (76348, 47915) - - 2020  Vitamin B12 and folate measurement (57274, 21684) - - 2020  CBC with diff (71125) - - 2020  25-OH-Vitamin D (34956) - - 2020  GPP 22 (FilmArray) - Gastrointestinal Pathogen Panel - QDx (69210) - - 2020  MedicationsMedications have been Reconciled  Transition of Care or Provider Policy  InstructionsPatient seen today via telehealth by agreement and consent of patient in light of current COVID-19 pandemic. I used video conferencing during the visit. The patient encounter is appropriate and reasonable under the circumstances given the patient's particular presentation at this time. The patient has been advised of the followin) the potential risks and limitations of this mode of treatment (including but not limited to the absence of in-person examination); 2) the right to refuse telehealth services at any point without affecting the right to future care; 3) the right to receive in-person services, included immediately after this consultation if an urgent need arises; 4) information, including identifiable images or information from this telehealth consult, will only be shared in accordance with HIPPA regulations. Any and all of the patient's and/or patient's family member's questions on this issue have been answered. The patient has verbally consented to be treated via telehealth services. The patient has also been advised to contact this office for worsening conditions or problems, and seek emergency medical treatment and/or call 911 if the patient deems either necessary.  (For commercial payers, telehealth video only) More than half of the face-to-face time used for counseling and coordination of care.  40 min appointment (39332 EP)  I have documented a list of current medications and reviewed it with the patient.  I encouraged the patient to diet and exercise.  DispositionReturn To Clinic in 2 Months  CorrespondenceCC this document (Slade Auguste MD, Brooke Talamantes MD, Arianna Jaquez MD) - 2020    44180 exam med 32 min time  needs refill or lomotil Can't eRx lomotil.  Aciphex refill.  Needs EGD and pouchoscopy         Electronically Signed by: MD MIGUEL A Carpio

## 2022-02-14 ENCOUNTER — TELEPHONE ENCOUNTER (OUTPATIENT)
Dept: URBAN - METROPOLITAN AREA CLINIC 98 | Facility: CLINIC | Age: 57
End: 2022-02-14

## 2022-02-14 RX ORDER — DICYCLOMINE HYDROCHLORIDE 20 MG/1
TAKE 2 TABLETS TABLET ORAL
Qty: 240 TABLETS | Refills: 11 | OUTPATIENT
Start: 2022-02-14 | End: 2023-02-09

## 2022-02-17 ENCOUNTER — TELEPHONE ENCOUNTER (OUTPATIENT)
Dept: URBAN - METROPOLITAN AREA CLINIC 98 | Facility: CLINIC | Age: 57
End: 2022-02-17

## 2022-02-17 RX ORDER — DICYCLOMINE HYDROCHLORIDE 20 MG/1
TAKE 2 TABLETS TABLET ORAL
Qty: 240 TABLETS | Refills: 11
Start: 2022-02-14 | End: 2023-02-12

## 2022-03-01 ENCOUNTER — OFFICE VISIT (OUTPATIENT)
Dept: URBAN - METROPOLITAN AREA CLINIC 97 | Facility: CLINIC | Age: 57
End: 2022-03-01
Payer: COMMERCIAL

## 2022-03-01 VITALS
HEIGHT: 69 IN | HEART RATE: 80 BPM | DIASTOLIC BLOOD PRESSURE: 68 MMHG | WEIGHT: 129 LBS | BODY MASS INDEX: 19.11 KG/M2 | TEMPERATURE: 96.5 F | RESPIRATION RATE: 18 BRPM | SYSTOLIC BLOOD PRESSURE: 105 MMHG

## 2022-03-01 DIAGNOSIS — K50.80 CROHN'S COLITIS: ICD-10-CM

## 2022-03-01 PROCEDURE — 96413 CHEMO IV INFUSION 1 HR: CPT | Performed by: INTERNAL MEDICINE

## 2022-03-01 RX ORDER — VITAMIN A 2400 MCG
1 TABLET CAPSULE ORAL ONCE A DAY
Qty: 90 TABLET | Refills: 1 | Status: ACTIVE | COMMUNITY
Start: 2022-01-12

## 2022-03-01 RX ORDER — RABEPRAZOLE SODIUM 20 MG/1
TAKE 1 TABLET BY MOUTH TWICE DAILY TABLET, DELAYED RELEASE ORAL
Qty: 180 | Refills: 3 | Status: ACTIVE | COMMUNITY

## 2022-03-01 RX ORDER — VEDOLIZUMAB 300 MG/5ML
INFUSE 300 MG OVER 30 MINUTE(S) BY INTRAVENOUS ROUTE EVERY 4 WEEKS INJECTION, POWDER, LYOPHILIZED, FOR SOLUTION INTRAVENOUS
Qty: 1 | Refills: 11 | Status: ACTIVE | COMMUNITY
Start: 1900-01-01

## 2022-03-01 RX ORDER — CYANOCOBALAMIN 1000 UG/ML
INJECT 1 ML INTRAMUSCULAR INJECTION INTRAMUSCULAR; SUBCUTANEOUS
Qty: 12 VIALS | Refills: 3 | Status: ACTIVE | COMMUNITY
Start: 2018-07-02 | End: 2022-05-28

## 2022-03-01 RX ORDER — PROMETHAZINE HYDROCHLORIDE 25 MG/1
TAKE 1 TABLETS TABLET ORAL BID
Qty: 60 TABLETS | Refills: 5 | Status: ACTIVE | COMMUNITY
Start: 2021-02-12

## 2022-03-01 RX ORDER — PROMETHAZINE HYDROCHLORIDE 25 MG/ML
TAKE 5 ML INJECTION INTRAMUSCULAR; INTRAVENOUS
Qty: 150 ML | Refills: 2 | Status: ACTIVE | COMMUNITY
Start: 2021-12-14 | End: 2022-03-14

## 2022-03-01 RX ORDER — METHOTREXATE 25 MG/ML
INJECT 1 ML INTO THE MUSCLE EVERY 7 DAYS INJECTION, SOLUTION INTRA-ARTERIAL; INTRAMUSCULAR; INTRATHECAL; INTRAVENOUS
Qty: 4 VIALS | Refills: 6 | Status: ACTIVE | COMMUNITY

## 2022-03-01 RX ORDER — IBUPROFEN 100 MG/1
TAKE 2 TABLETS (200 MG) BY ORAL ROUTE EVERY 6 HOURS AS NEEDED WITH FOOD TABLET, COATED ORAL
Qty: 0 | Refills: 0 | Status: ACTIVE | COMMUNITY
Start: 1900-01-01

## 2022-03-01 RX ORDER — ONDANSETRON HYDROCHLORIDE 8 MG/1
1 CAPSULE TABLET, FILM COATED ORAL
Qty: 120 CAPSULES | Refills: 5 | Status: ACTIVE | COMMUNITY
Start: 2021-09-03 | End: 2022-03-01

## 2022-03-01 RX ORDER — IBUPROFEN 200 MG/1
TAKE 1 CAPSULE (200 MG) BY ORAL ROUTE EVERY 6 HOURS AS NEEDED CAPSULE, LIQUID FILLED ORAL
Qty: 0 | Refills: 0 | Status: ACTIVE | COMMUNITY
Start: 1900-01-01

## 2022-03-01 RX ORDER — ALENDRONATE SODIUM 70 MG
TABLET ORAL
Qty: 0 | Refills: 0 | Status: ACTIVE | COMMUNITY
Start: 1900-01-01

## 2022-03-01 RX ORDER — DIPHENOXYLATE HYDROCHLORIDE AND ATROPINE SULFATE 2.5; .025 MG/1; MG/1
TAKE 2 TABLETS TABLET ORAL
Qty: 120 TABLETS | Refills: 11 | Status: ACTIVE | COMMUNITY
Start: 2021-06-02 | End: 2022-05-28

## 2022-03-01 RX ORDER — PREDNISONE 10 MG/1
TAKE 40 MG DAILY TABLET ORAL ONCE A DAY
Status: ACTIVE | COMMUNITY

## 2022-03-01 RX ORDER — RABEPRAZOLE SODIUM 20 MG/1
TAKE 1 TABLET TABLET, DELAYED RELEASE ORAL TWICE DAILY
Qty: 180 TABLETS | Refills: 6 | Status: ACTIVE | COMMUNITY
Start: 2019-02-17

## 2022-03-01 RX ORDER — METHOTREXATE 2.5 MG/1
TAKE 5 TABLETS TABLET ORAL
Qty: 60 TABLETS | Refills: 5 | Status: ACTIVE | COMMUNITY
Start: 2021-08-13

## 2022-03-01 RX ORDER — DICYCLOMINE HYDROCHLORIDE 20 MG/1
TAKE 2 TABLETS TABLET ORAL
Qty: 240 TABLETS | Refills: 11 | Status: ACTIVE | COMMUNITY
Start: 2022-02-14 | End: 2023-02-12

## 2022-03-01 RX ORDER — LETROZOLE TABLETS 2.5 MG/1
TAKE 1 TABLET (2.5 MG) BY ORAL ROUTE ONCE DAILY TABLET, FILM COATED ORAL 1
Qty: 0 | Refills: 0 | Status: ACTIVE | COMMUNITY
Start: 1900-01-01

## 2022-03-01 RX ORDER — DIPHENOXYLATE HCL/ATROPINE 2.5-.025MG
TAKE 2 TABLETS TABLET ORAL TWICE DAILY
Qty: 360 TABLETS | Refills: 1 | Status: ACTIVE | COMMUNITY

## 2022-03-01 RX ORDER — VEDOLIZUMAB 300 MG/5ML
AS DIRECTED INJECTION, POWDER, LYOPHILIZED, FOR SOLUTION INTRAVENOUS
Qty: 300 MILLIGRAMS | Refills: 0 | Status: ACTIVE | COMMUNITY
Start: 2022-02-08 | End: 2022-05-09

## 2022-03-04 ENCOUNTER — TELEPHONE ENCOUNTER (OUTPATIENT)
Dept: URBAN - METROPOLITAN AREA CLINIC 98 | Facility: CLINIC | Age: 57
End: 2022-03-04

## 2022-03-07 ENCOUNTER — TELEPHONE ENCOUNTER (OUTPATIENT)
Dept: URBAN - METROPOLITAN AREA CLINIC 98 | Facility: CLINIC | Age: 57
End: 2022-03-07

## 2022-03-07 ENCOUNTER — LAB OUTSIDE AN ENCOUNTER (OUTPATIENT)
Dept: URBAN - METROPOLITAN AREA CLINIC 98 | Facility: CLINIC | Age: 57
End: 2022-03-07

## 2022-03-10 ENCOUNTER — TELEPHONE ENCOUNTER (OUTPATIENT)
Dept: URBAN - METROPOLITAN AREA CLINIC 98 | Facility: CLINIC | Age: 57
End: 2022-03-10

## 2022-03-18 ENCOUNTER — TELEPHONE ENCOUNTER (OUTPATIENT)
Dept: URBAN - METROPOLITAN AREA CLINIC 92 | Facility: CLINIC | Age: 57
End: 2022-03-18

## 2022-03-20 ENCOUNTER — ERX REFILL RESPONSE (OUTPATIENT)
Dept: URBAN - METROPOLITAN AREA CLINIC 98 | Facility: CLINIC | Age: 57
End: 2022-03-20

## 2022-03-20 RX ORDER — LIDOCAINE HYDROCHLORIDE 20 MG/ML
SWISH AND SPIT 15ML BY MOUTH THREE TIMES A DAY SOLUTION ORAL; TOPICAL
Qty: 800 MILLILITER | Refills: 2 | OUTPATIENT

## 2022-03-21 ENCOUNTER — TELEPHONE ENCOUNTER (OUTPATIENT)
Dept: URBAN - METROPOLITAN AREA CLINIC 98 | Facility: CLINIC | Age: 57
End: 2022-03-21

## 2022-03-21 RX ORDER — BUDESONIDE 3 MG/1
2 CAPSULES CAPSULE, COATED PELLETS ORAL ONCE A DAY
Qty: 60 CAPSULES | Refills: 5 | OUTPATIENT
Start: 2022-03-21

## 2022-03-23 LAB
A/G RATIO: 1.4
ALBUMIN: 4.2
ALKALINE PHOSPHATASE: 96
ALT (SGPT): 5
AST (SGOT): 11
BASO (ABSOLUTE): 0
BASOS: 1
BILIRUBIN, TOTAL: 0.3
BUN/CREATININE RATIO: 17
BUN: 16
CALCIUM: 9.2
CARBON DIOXIDE, TOTAL: 21
CHLORIDE: 95
CREATININE: 0.92
EGFR: 74
EOS (ABSOLUTE): 0.1
EOS: 2
FERRITIN, SERUM: 1662
FOLATE (FOLIC ACID), SERUM: >20
GLOBULIN, TOTAL: 2.9
GLUCOSE: 84
HBSAG SCREEN: NEGATIVE
HEMATOCRIT: 32.2
HEMATOLOGY COMMENTS:: (no result)
HEMOGLOBIN: 11.5
HEP B SURFACE AB, QUAL: NON REACTIVE
IMMATURE CELLS: (no result)
IMMATURE GRANS (ABS): 0
IMMATURE GRANULOCYTES: 1
IRON BIND.CAP.(TIBC): 178
IRON SATURATION: 90
IRON: 160
LYMPHS (ABSOLUTE): 1.6
LYMPHS: 24
MCH: 40.4
MCHC: 35.7
MCV: 113
MONOCYTES(ABSOLUTE): 0.6
MONOCYTES: 10
NEUTROPHILS (ABSOLUTE): 4.1
NEUTROPHILS: 62
NRBC: (no result)
PLATELETS: 179
POTASSIUM: 4.1
PROTEIN, TOTAL: 7.1
QUANTIFERON CRITERIA: (no result)
QUANTIFERON INCUBATION: (no result)
QUANTIFERON MITOGEN VALUE: >10
QUANTIFERON NIL VALUE: 0.05
QUANTIFERON TB1 AG VALUE: 0.04
QUANTIFERON TB2 AG VALUE: 0.04
QUANTIFERON-TB GOLD PLUS: NEGATIVE
RBC: 2.85
RDW: 12.7
SODIUM: 131
UIBC: 18
VITAMIN B12: 1134
WBC: 6.4

## 2022-03-24 ENCOUNTER — TELEPHONE ENCOUNTER (OUTPATIENT)
Dept: URBAN - METROPOLITAN AREA CLINIC 98 | Facility: CLINIC | Age: 57
End: 2022-03-24

## 2022-03-24 ENCOUNTER — LAB OUTSIDE AN ENCOUNTER (OUTPATIENT)
Dept: URBAN - METROPOLITAN AREA CLINIC 98 | Facility: CLINIC | Age: 57
End: 2022-03-24

## 2022-03-28 ENCOUNTER — TELEPHONE ENCOUNTER (OUTPATIENT)
Dept: URBAN - METROPOLITAN AREA CLINIC 98 | Facility: CLINIC | Age: 57
End: 2022-03-28

## 2022-03-29 ENCOUNTER — OFFICE VISIT (OUTPATIENT)
Dept: URBAN - METROPOLITAN AREA CLINIC 97 | Facility: CLINIC | Age: 57
End: 2022-03-29
Payer: COMMERCIAL

## 2022-03-29 DIAGNOSIS — K50.80 CROHN'S COLITIS: ICD-10-CM

## 2022-03-29 PROCEDURE — 96413 CHEMO IV INFUSION 1 HR: CPT | Performed by: INTERNAL MEDICINE

## 2022-03-29 RX ORDER — METHOTREXATE 25 MG/ML
INJECT 1 ML INTO THE MUSCLE EVERY 7 DAYS INJECTION, SOLUTION INTRA-ARTERIAL; INTRAMUSCULAR; INTRATHECAL; INTRAVENOUS
Qty: 4 VIALS | Refills: 6 | Status: ACTIVE | COMMUNITY

## 2022-03-29 RX ORDER — PROMETHAZINE HYDROCHLORIDE 25 MG/1
TAKE 1 TABLETS TABLET ORAL BID
Qty: 60 TABLETS | Refills: 5 | Status: ACTIVE | COMMUNITY
Start: 2021-02-12

## 2022-03-29 RX ORDER — BUDESONIDE 3 MG/1
2 CAPSULES CAPSULE, COATED PELLETS ORAL ONCE A DAY
Qty: 60 CAPSULES | Refills: 5 | Status: ACTIVE | COMMUNITY
Start: 2022-03-21

## 2022-03-29 RX ORDER — IBUPROFEN 100 MG/1
TAKE 2 TABLETS (200 MG) BY ORAL ROUTE EVERY 6 HOURS AS NEEDED WITH FOOD TABLET, COATED ORAL
Qty: 0 | Refills: 0 | Status: ACTIVE | COMMUNITY
Start: 1900-01-01

## 2022-03-29 RX ORDER — CYANOCOBALAMIN 1000 UG/ML
INJECT 1 ML INTRAMUSCULAR INJECTION INTRAMUSCULAR; SUBCUTANEOUS
Qty: 12 VIALS | Refills: 3 | Status: ACTIVE | COMMUNITY
Start: 2018-07-02 | End: 2022-05-28

## 2022-03-29 RX ORDER — PREDNISONE 10 MG/1
TAKE 40 MG DAILY TABLET ORAL ONCE A DAY
Status: ACTIVE | COMMUNITY

## 2022-03-29 RX ORDER — VITAMIN A 2400 MCG
1 TABLET CAPSULE ORAL ONCE A DAY
Qty: 90 TABLET | Refills: 1 | Status: ACTIVE | COMMUNITY
Start: 2022-01-12

## 2022-03-29 RX ORDER — METHOTREXATE 2.5 MG/1
TAKE 5 TABLETS TABLET ORAL
Qty: 60 TABLETS | Refills: 5 | Status: ACTIVE | COMMUNITY
Start: 2021-08-13

## 2022-03-29 RX ORDER — RABEPRAZOLE SODIUM 20 MG/1
TAKE 1 TABLET BY MOUTH TWICE DAILY TABLET, DELAYED RELEASE ORAL
Qty: 180 | Refills: 3 | Status: ACTIVE | COMMUNITY

## 2022-03-29 RX ORDER — LIDOCAINE HYDROCHLORIDE 20 MG/ML
SWISH AND SPIT 15ML BY MOUTH THREE TIMES A DAY SOLUTION ORAL; TOPICAL
Qty: 800 MILLILITER | Refills: 2 | Status: ACTIVE | COMMUNITY

## 2022-03-29 RX ORDER — IBUPROFEN 200 MG/1
TAKE 1 CAPSULE (200 MG) BY ORAL ROUTE EVERY 6 HOURS AS NEEDED CAPSULE, LIQUID FILLED ORAL
Qty: 0 | Refills: 0 | Status: ACTIVE | COMMUNITY
Start: 1900-01-01

## 2022-03-29 RX ORDER — VEDOLIZUMAB 300 MG/5ML
AS DIRECTED INJECTION, POWDER, LYOPHILIZED, FOR SOLUTION INTRAVENOUS
Qty: 300 MILLIGRAMS | Refills: 0 | Status: ACTIVE | COMMUNITY
Start: 2022-02-08 | End: 2022-05-09

## 2022-03-29 RX ORDER — LETROZOLE TABLETS 2.5 MG/1
TAKE 1 TABLET (2.5 MG) BY ORAL ROUTE ONCE DAILY TABLET, FILM COATED ORAL 1
Qty: 0 | Refills: 0 | Status: ACTIVE | COMMUNITY
Start: 1900-01-01

## 2022-03-29 RX ORDER — DICYCLOMINE HYDROCHLORIDE 20 MG/1
TAKE 2 TABLETS TABLET ORAL
Qty: 240 TABLETS | Refills: 11 | Status: ACTIVE | COMMUNITY
Start: 2022-02-14 | End: 2023-02-12

## 2022-03-29 RX ORDER — DIPHENOXYLATE HYDROCHLORIDE AND ATROPINE SULFATE 2.5; .025 MG/1; MG/1
TAKE 2 TABLETS TABLET ORAL
Qty: 120 TABLETS | Refills: 11 | Status: ACTIVE | COMMUNITY
Start: 2021-06-02 | End: 2022-05-28

## 2022-03-29 RX ORDER — VEDOLIZUMAB 300 MG/5ML
INFUSE 300 MG OVER 30 MINUTE(S) BY INTRAVENOUS ROUTE EVERY 4 WEEKS INJECTION, POWDER, LYOPHILIZED, FOR SOLUTION INTRAVENOUS
Qty: 1 | Refills: 11 | Status: ACTIVE | COMMUNITY
Start: 1900-01-01

## 2022-03-29 RX ORDER — DIPHENOXYLATE HCL/ATROPINE 2.5-.025MG
TAKE 2 TABLETS TABLET ORAL TWICE DAILY
Qty: 360 TABLETS | Refills: 1 | Status: ACTIVE | COMMUNITY

## 2022-03-29 RX ORDER — ALENDRONATE SODIUM 70 MG
TABLET ORAL
Qty: 0 | Refills: 0 | Status: ACTIVE | COMMUNITY
Start: 1900-01-01

## 2022-03-29 RX ORDER — RABEPRAZOLE SODIUM 20 MG/1
TAKE 1 TABLET TABLET, DELAYED RELEASE ORAL TWICE DAILY
Qty: 180 TABLETS | Refills: 6 | Status: ACTIVE | COMMUNITY
Start: 2019-02-17

## 2022-04-07 ENCOUNTER — WEB ENCOUNTER (OUTPATIENT)
Dept: URBAN - METROPOLITAN AREA CLINIC 98 | Facility: CLINIC | Age: 57
End: 2022-04-07

## 2022-04-12 ENCOUNTER — OFFICE VISIT (OUTPATIENT)
Dept: URBAN - METROPOLITAN AREA CLINIC 98 | Facility: CLINIC | Age: 57
End: 2022-04-12
Payer: COMMERCIAL

## 2022-04-12 DIAGNOSIS — K50.80 CROHN'S DISEASE OF BOTH SMALL AND LARGE INTESTINE WITHOUT COMPLICATIONS: ICD-10-CM

## 2022-04-12 DIAGNOSIS — R10.84 ABDOMINAL PAIN, GENERALIZED: ICD-10-CM

## 2022-04-12 DIAGNOSIS — R19.7 DIARRHEA: ICD-10-CM

## 2022-04-12 DIAGNOSIS — K80.20 CHOLELITHIASIS: ICD-10-CM

## 2022-04-12 PROCEDURE — 99214 OFFICE O/P EST MOD 30 MIN: CPT | Performed by: INTERNAL MEDICINE

## 2022-04-12 RX ORDER — LIDOCAINE HYDROCHLORIDE 20 MG/ML
SWISH AND SPIT 15ML BY MOUTH THREE TIMES A DAY SOLUTION ORAL; TOPICAL
Qty: 800 MILLILITER | Refills: 2 | Status: ACTIVE | COMMUNITY

## 2022-04-12 RX ORDER — ALENDRONATE SODIUM 70 MG
TABLET ORAL
Qty: 0 | Refills: 0 | Status: ACTIVE | COMMUNITY
Start: 1900-01-01

## 2022-04-12 RX ORDER — METHOTREXATE 2.5 MG/1
TAKE 5 TABLETS TABLET ORAL
Qty: 60 TABLETS | Refills: 5 | Status: ACTIVE | COMMUNITY
Start: 2021-08-13

## 2022-04-12 RX ORDER — VEDOLIZUMAB 300 MG/5ML
AS DIRECTED INJECTION, POWDER, LYOPHILIZED, FOR SOLUTION INTRAVENOUS
Qty: 300 MILLIGRAMS | Refills: 0 | Status: ACTIVE | COMMUNITY
Start: 2022-02-08 | End: 2022-05-09

## 2022-04-12 RX ORDER — METHOTREXATE 25 MG/ML
INJECT 1 ML INTO THE MUSCLE EVERY 7 DAYS INJECTION, SOLUTION INTRA-ARTERIAL; INTRAMUSCULAR; INTRATHECAL; INTRAVENOUS
Qty: 4 VIALS | Refills: 6 | Status: ACTIVE | COMMUNITY

## 2022-04-12 RX ORDER — DICYCLOMINE HYDROCHLORIDE 20 MG/1
TAKE 2 TABLETS TABLET ORAL
Qty: 240 TABLETS | Refills: 11 | Status: ACTIVE | COMMUNITY
Start: 2022-02-14 | End: 2023-02-12

## 2022-04-12 RX ORDER — RABEPRAZOLE SODIUM 20 MG/1
TAKE 1 TABLET BY MOUTH TWICE DAILY TABLET, DELAYED RELEASE ORAL
Qty: 180 | Refills: 3 | Status: ACTIVE | COMMUNITY

## 2022-04-12 RX ORDER — VITAMIN A 2400 MCG
1 TABLET CAPSULE ORAL ONCE A DAY
Qty: 90 TABLET | Refills: 1 | Status: ACTIVE | COMMUNITY
Start: 2022-01-12

## 2022-04-12 RX ORDER — CYANOCOBALAMIN 1000 UG/ML
INJECT 1 ML INTRAMUSCULAR INJECTION INTRAMUSCULAR; SUBCUTANEOUS
Qty: 12 VIALS | Refills: 3 | Status: ACTIVE | COMMUNITY
Start: 2018-07-02 | End: 2022-05-28

## 2022-04-12 RX ORDER — BUDESONIDE 3 MG/1
2 CAPSULES CAPSULE, COATED PELLETS ORAL ONCE A DAY
Qty: 60 CAPSULES | Refills: 5 | Status: ACTIVE | COMMUNITY
Start: 2022-03-21

## 2022-04-12 RX ORDER — PREDNISONE 10 MG/1
TAKE 40 MG DAILY TABLET ORAL ONCE A DAY
Status: ACTIVE | COMMUNITY

## 2022-04-12 RX ORDER — IBUPROFEN 200 MG/1
TAKE 1 CAPSULE (200 MG) BY ORAL ROUTE EVERY 6 HOURS AS NEEDED CAPSULE, LIQUID FILLED ORAL
Qty: 0 | Refills: 0 | Status: ACTIVE | COMMUNITY
Start: 1900-01-01

## 2022-04-12 RX ORDER — DIPHENOXYLATE HYDROCHLORIDE AND ATROPINE SULFATE 2.5; .025 MG/1; MG/1
TAKE 2 TABLETS TABLET ORAL
Qty: 120 TABLETS | Refills: 11 | Status: ACTIVE | COMMUNITY
Start: 2021-06-02 | End: 2022-05-28

## 2022-04-12 RX ORDER — RABEPRAZOLE SODIUM 20 MG/1
TAKE 1 TABLET TABLET, DELAYED RELEASE ORAL TWICE DAILY
Qty: 180 TABLETS | Refills: 6 | Status: ACTIVE | COMMUNITY
Start: 2019-02-17

## 2022-04-12 RX ORDER — DIPHENOXYLATE HCL/ATROPINE 2.5-.025MG
TAKE 2 TABLETS TABLET ORAL TWICE DAILY
Qty: 360 TABLETS | Refills: 1 | Status: ACTIVE | COMMUNITY

## 2022-04-12 RX ORDER — VEDOLIZUMAB 300 MG/5ML
INFUSE 300 MG OVER 30 MINUTE(S) BY INTRAVENOUS ROUTE EVERY 4 WEEKS INJECTION, POWDER, LYOPHILIZED, FOR SOLUTION INTRAVENOUS
Qty: 1 | Refills: 11 | Status: ACTIVE | COMMUNITY
Start: 1900-01-01

## 2022-04-12 RX ORDER — IBUPROFEN 100 MG/1
TAKE 2 TABLETS (200 MG) BY ORAL ROUTE EVERY 6 HOURS AS NEEDED WITH FOOD TABLET, COATED ORAL
Qty: 0 | Refills: 0 | Status: ACTIVE | COMMUNITY
Start: 1900-01-01

## 2022-04-12 RX ORDER — LETROZOLE TABLETS 2.5 MG/1
TAKE 1 TABLET (2.5 MG) BY ORAL ROUTE ONCE DAILY TABLET, FILM COATED ORAL 1
Qty: 0 | Refills: 0 | Status: ACTIVE | COMMUNITY
Start: 1900-01-01

## 2022-04-12 RX ORDER — PROMETHAZINE HYDROCHLORIDE 25 MG/1
TAKE 1 TABLETS TABLET ORAL BID
Qty: 60 TABLETS | Refills: 5 | Status: ACTIVE | COMMUNITY
Start: 2021-02-12

## 2022-04-12 NOTE — HPI-TODAY'S VISIT:
55 yo female comes in for urgent f/u re; need for Promethazine 6.25 mg/5ml solution as this works better for her when she has acute nausea and nausea unrelieved by ondansetron. I feel this is an appropriate need and should be approved by insurance even though it is non-formulary.  MRE is pending and plan per radiology is to use barium as volumen as she vomits with it.  She experiences nausea every day due to bowel dysfunction from active Crohns disease and other factors.  Meds: Entyvio 300 mg iv q 4 w Methotrexate 1 cc per week.  Wants to reduce to .5 cc per week and that is fine. Folic acid 1 mg a da\7 Budesonide 3 mg now, reduced from 9 m,g =============================== 22  55 yo female returns for 5 week f/u. Stoma is fully healed. No issues with the appliances. Emptying bag 5-6 times a day and coping.  She has been having abdominal pain for the past few weeks. It felt like someone was "trying to pull the uterus out" and she calls it scar tissue. There was nothing on US, to cause the pain. MRE from 21 showed ??problable low grade partial SBO but this may be dysfunction.  She came to see me for the iron deficiency anemia. She has noticed some bleeding.  She still has urgency to move her bowels. Yesterday was scary becasue she saw blood in the rectal fluid. Passes rectal fluid 8-10 times a day. At night, it can be horrendous. Bottom of diaper is full.  On Entyvio 300 mg iv q 4 Methotrexate 1 cc q week Budesonide -- 3m 3 per day.  -Was feeling bad 6 months ago or so and Bud was started. May be worth trying to taper   ============================= 22  55 yo female with CD and ileostomy comes in for 3 month f/u visit.  Finally healing from stoma revision 10/15/21.  Stoma never stuck out enough and kept   Dr. Talamantes did a stoma revision.  Mild "complication" as skin around the stoma, stoma had a "moat" around it.  Empties bag 5-6 times. Interval check. Eating ok. Mouth is healing from the leucovorin to lessen the effect of mtx on mouth.  Getting guidance from Myerson.  ===================================== 10/6/21  55 yo female with Crohn's and avascular necrosis  and saw Dr. Myerson 2 days ago and he did blood work has some plans - f/u in 3 weeks.  Sees Dr. Gardiner tomorrow to address major ostomy issues.  Considering ostomy revision.  Retracted stoma.  Saw her mother and needed more supplies due to leaking.  Entocort now for 10 weeks, 6 mg a day, empties ostomy 6-9 times.  Has leaked but not in recent weeks.  Never on 9 mg and capri 6 mg for 10 weeks.  Try alternating 6 mg and 3 mg on alternate days.    ========================  21  55 yo female with CD now for 1 month f/u after telehealth and doing better 5 weeks into course of Entocort tx.  Does havd avascular necrosis of hips and understands there are risks of similar complications with Entocort therapy.  Was on fosamax. I just called Dr. Myerson to arrange an appoinment.  Ostomy - empties it 6 times so far today. Occasional abdominal pain.  On Entyvio every 4 weeks Entocort 9 mg a day x 5 weeks.  Asked to have CD4/CD8 checked, by sister's immunologist.   ===================== 8/3/21  55 yo female for 'TH f/u due to beeing weak and in bed. In ER last week and they sent her home.  Meds: Entyvio 300 mg q 4 weeks Entocort  x 1 week Off prednisone - had been on prednisone- she does not think it is what was helping her. On 1 cc mtx per week.  Devin Auguste is primary MD Saw him a month ago.  Main syx now.5 days ago was throwing up and was so distended. Could not poke her stomach. went to the ER Wednesday. N/V. Aciphex 1 twice daily. Last looked  Never had an ulcer. Ileostomy in place.  Fever on Friday, up to 101. Eating now. Hungry yesterday  Not vomiting.  May neede EGD.     ====================== 21  54 yo female comes in for f/u.  She is "off" per Dr. Jaquez.  She did an MRI. Dr. Jaquez reportedly thinks it is her meds. Has not seen a neurologist in years. Recent  labs were nl mostly with Hb 10.1, nl K-4.0 and Ca. Nl ESR and CRP, but had just finished medrol dose eliza.  Wants a Mg level. Labs to be sent to [facility].  Nl quantiferon.  Currently , getting Entyvio every 4 weeks.  Getting entyvio every 4 weeks. Taking 2 weeks off mtx due to stomatitis. If resuming, would reduce from 1 cc to .5 cc..  She is not sure how much she needs mtx and how much it has helped in the past. Mtx has very likely caused the stomatis. Or the pred medrol could have helped the mouth and belly. She has avn all over.  Myerson will see her and order bone scan  Addendum ------21 Has thicker stools Has to change her bag because of high outputs She says her stoma is an outie  She is on pred 20 mg and should taper off it by going to 10 mg to 5 mg for a week and then d/c Risks of prednisone discussed.  Had dropped mtx from 25 to 12.5 and will go back up to 25 mg a week. Consider [facility] referral if not improvimg at that point. ============ 21  54 yo female with CD and ostomy for 5 month f/u.  Abd is good. Ostomy is functioning. Sleep thru much of night.  Appetite and intake is better. Oral CD problematic. Has been in touch with Dr. Pepe ---- swish and spit.  Max-- 150. Current weight is 121.  Not taking supplements --  Boost and Ensure. 2-3 cans The y don't agree with her.. R Lactose free ice  She wants low dose prednisone. Lets try 2.5-5 mg a day.  ================== 20  54 yo female for 3 week TH f/u. Has upper abdominal pain that may be due to gallstones. Had referred her to Dr. Moreland but she did not call. She heard he was retiring and not. Has abdominal pain when she. Left upper quadrant or "upper rib cage" mostly left. He knows Dr. Talamantes. -  Frustrated---- blisters on lip. Torn open. Has not tried steroid rinse or paste in past. Magic  mouth wash helps. Does not reduce the blisters. Dr. Best- non steroid options for mouth   ======================== 10/23/20  10/23 54 yo female for 3 week  f/u. Has oral Crohn's and I-C and stoma issues. Entyvio may not cover the EIM. Might try oral steroid lozenge --- pred paste and lozenges.  Entyvio 300 q 4 weeks Mtx 1 cc q week Pred 5 mg may stop. Bentyl -  Abd pain is ok No idea what triggered her admission Food induced -   Consider medrol dose eliza in the future  Extensive lab review CBC --- Hct 31.6/Hb 10.9 plt 120 CRP 22 ESR 56 Iron 33% Ferrintin good ================================================== 10/2/20  54 yo female with  Crohn's disease recently hosp 3 days at  for abd pain, N/V and got better with 3 dasy 3 days of iv steroids.  Surgery considered.  Was on 40 mg pred - intolerant anxiety  Now dropped to 30 mg a day. Now is 50-60% better than admission.  Surgery debated gallstones. They leaned toward blockage.  Has been on Stelara. Entyvio works better than Stelara. Mtx makes her tired She would like to up the dose of lomotil.  20  53 yo female with Crohnn's disease having continued active syx. Getting Entyvio every 4 weeks. Methotrexate 12.5 mg per week Dicyclomine tablets 2 po qid  == She has taken Xifaxan and it tears her up.   Then stop it. Does not like flagyl and does not help.  Pepto 1-2 doses a day, does help the diarreha. She uses Immodium every day.  Stooling ----- empties bag 6-10 times a day. Abdominal pain - when she eats and intermittent. Energy - gone.  Labs- Anemia - Hb lower CRP is higher ---34 (was 16).   ========================== 20  Crohn's disease.     53 yo female with Crohn's disease comes in for f/u.  Dr. Jaquez is considering a GYN etiology.  Needs a pouch exam.  Getting Entyvio every 4 weeks.  Stelara did not work as well as Entyvio.  Normally when she gets an infusion, she gets a bounce and has not.  Prometheus Vedo level 3/25, just before 4 week dose was great_ __ _32.5 and no antibodies.  Feb 10 labs looked good CRP 29.9 and cbc looked fine.  ESR elevation.  2 liters of miralax_ __ _ Try 6-8am _ __ _- and then an exam at 2-3 pm She asked about a pilonidal cyst and I directed to Dr. Talamantes  Xifaxan  Try pepto bismol 1-2 prn on days.    ===== 20  53 yo female comes in at my request for 3 week f/u of Crohn's due to new 4 x eleation of CRP at 29.9.  19 CRP was similarly elevated 4x at 2.1/.5  Radhaoliva is checking CT abd-pelvis due to pelvic pain which is not characteristic of her Crohn's.  She is wondering about GYN.  Or spastic bladder.  Dr. Talamantes thought stable but wants to scope to be sure.  Empties bag 10x a day.That is more than 3-6 months ago and has leakage of the ostomy bag. Maybe more volume.  Very watery.  Review of labs from 2/10 show cRP 29.i9 but others are stable. CBC looks good with trace elevation of wbc and borderline anemia. Getting iv iron from Arianna Jaquez. Vit D is normal at 39. Albumin is normal at 4.5.  MRI 10/1/19 showed gallstones , present in past, and she has mild pain that is intermittent Liver tests are nl. Can assess with HIDA, and/or with surgical consult, e.g ???relative need for choly, high vs. low.    ================ 20  53 yo female with severe Crohn's disease and h/o breast cancer _ _ Arianna Jaquez MD on Letrasol. 10th year.  Abdominal pain is confusing.  Has gotten pain in the ovary and in the uterus. GYN-Ann Laird MD and did US was fine. Sent to PT for pelvic treatment.  At her comment, I reviewed [facility] imaging 16 nl CTE 5/3/17 nl [facility] US Oct 2 2019 "Cholelithiasis"   o/w MRE was nl - no inflammation. BUT THE MRI DID SHOW "PERSISTENT TETHERING OF UTERUS TO RECTAL STUMP"  On Entyvio 300 mg q 4 weeks.  Next dose in 2 weeks On mtx _ _.5 cc. Monthly b12 shots  Eating ok, some weight loss. Weight stable x 6 months and down 30 lb since _ __ _- she is comfortable with current weight.  ============= 19  53 yo female with severe Crohn's and h/o breast cancer comes in for 3 month f/u.  Doing poorly.  Daily abd pain which is intermittent.  When she eats she gets pain and nausea.  Taking zofran and phenergan.  Sleep pattern is awry.  Empties bag 5 times a day and going out of rectum.  Has a loop ileostomy that may be problematic.  Allergic to tape.  Currently on Entyvio 300 mg q 8 weeks.  Stelara did not work as well.  Discussed the entyvio at shorter cycles of q 4 weeks to see if that will control syx better.  Outputs are higher and she may be volume depleted and maybe more entyvio will help.  last entyvio was last week and so a level won't be useful today.  Will just proceed with dose increase.    ============== 18 53 yo female with Crohn's disease and breast cancer and diverting ileostomy from Dr. Talamantes.  Skin issue - addressed by Dr. Best.  See Brooke Talamantes MD and enterostomal therapist.  AGWS.  No fcs.      Review of Systems  ConstitutionalDenies : body aches, fever, weight gain, weight loss  CardiovascularDenies : chest pain, heart racing/skipping, high blood pressure  RespiratoryDenies : chronic cough, shortness of breath, wheezing or asthma symptoms  GastrointestinalDenies : Abdominal Pain/discomfort, Anal/Rectal Pain or Itching, Anal Spasm, Black Stool, Bloating/belching/gaseouness, Change of Bowel Habit, Constipation, Diarrhea/Loose Stool, Difficulty in Swallowing, Heartburn/esophageal reflux, Hemorrhoids, Indigestion, Mucus in Stool, Nausea/vomiting, Rectal Bleeding, Unintentional Weight Loss    Vitals  Date Time BP Position Site L\R Cuff Size HR RR TEMP (F) WT  HT  BMI kg/m2 BSA m2 O2 Sat HC     2020 05:15 PM         133lbs 0oz 5'  8.5" 19.93 1.71          Assessment  Crohn's Disease     555.2  Diarrhea     787.91/R19.7  Cholelithiasis     574.20/K80.20  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 () - - 2020  EGD w/Biopsy if indicated (32693) - - 2020  Pouchoscopy (49588) - - 2020  CMP (comprehensive metabolic panel) (27617) - - 2020  Sed rate (18693) - - 2020  C-reactive protein (55872) - - 2020  Ferritin assay (72591) - - 2020  Iron + iron binding capacity panel ser/plas (29812, 91218) - - 2020  Vitamin B12 and folate measurement (92162, 97117) - - 2020  CBC with diff (06293) - - 2020  25-OH-Vitamin D (76974) - - 2020  GPP 22 (FilmArray) - Gastrointestinal Pathogen Panel - QDx (39015) - - 2020  MedicationsMedications have been Reconciled  Transition of Care or Provider Policy  InstructionsPatient seen today via telehealth by agreement and consent of patient in light of current COVID-19 pandemic. I used video conferencing during the visit. The patient encounter is appropriate and reasonable under the circumstances given the patient's particular presentation at this time. The patient has been advised of the followin) the potential risks and limitations of this mode of treatment (including but not limited to the absence of in-person examination); 2) the right to refuse telehealth services at any point without affecting the right to future care; 3) the right to receive in-person services, included immediately after this consultation if an urgent need arises; 4) information, including identifiable images or information from this telehealth consult, will only be shared in accordance with HIPPA regulations. Any and all of the patient's and/or patient's family member's questions on this issue have been answered. The patient has verbally consented to be treated via telehealth services. The patient has also been advised to contact this office for worsening conditions or problems, and seek emergency medical treatment and/or call 911 if the patient deems either necessary.  (For commercial payers, telehealth video only) More than half of the face-to-face time used for counseling and coordination of care.  40 min appointment (48720 EP)  I have documented a list of current medications and reviewed it with the patient.  I encouraged the patient to diet and exercise.  DispositionReturn To Clinic in 2 Months  CorrespondenceCC this document (Slade Auguste MD, Brooke Talamantes MD, Arianna Jaquez MD) - 2020    24286 exam med 32 min time  needs refill or lomotil Can't eRx lomotil.  Aciphex refill.  Needs EGD and pouchoscopy         Electronically Signed by: MD Cindy CarpioA

## 2022-04-27 ENCOUNTER — OFFICE VISIT (OUTPATIENT)
Dept: URBAN - METROPOLITAN AREA CLINIC 97 | Facility: CLINIC | Age: 57
End: 2022-04-27
Payer: COMMERCIAL

## 2022-04-27 VITALS
HEART RATE: 96 BPM | TEMPERATURE: 97.2 F | RESPIRATION RATE: 18 BRPM | WEIGHT: 128 LBS | DIASTOLIC BLOOD PRESSURE: 71 MMHG | SYSTOLIC BLOOD PRESSURE: 124 MMHG | BODY MASS INDEX: 18.96 KG/M2 | HEIGHT: 69 IN

## 2022-04-27 DIAGNOSIS — K50.80 CROHN'S COLITIS: ICD-10-CM

## 2022-04-27 PROCEDURE — 96365 THER/PROPH/DIAG IV INF INIT: CPT | Performed by: INTERNAL MEDICINE

## 2022-04-27 RX ORDER — PROMETHAZINE HYDROCHLORIDE 25 MG/1
TAKE 1 TABLETS TABLET ORAL BID
Qty: 60 TABLETS | Refills: 5 | Status: ACTIVE | COMMUNITY
Start: 2021-02-12

## 2022-04-27 RX ORDER — DIPHENOXYLATE HCL/ATROPINE 2.5-.025MG
TAKE 2 TABLETS TABLET ORAL TWICE DAILY
Qty: 360 TABLETS | Refills: 1 | Status: ACTIVE | COMMUNITY

## 2022-04-27 RX ORDER — VITAMIN A 2400 MCG
1 TABLET CAPSULE ORAL ONCE A DAY
Qty: 90 TABLET | Refills: 1 | Status: ACTIVE | COMMUNITY
Start: 2022-01-12

## 2022-04-27 RX ORDER — PREDNISONE 10 MG/1
TAKE 40 MG DAILY TABLET ORAL ONCE A DAY
Status: ACTIVE | COMMUNITY

## 2022-04-27 RX ORDER — LETROZOLE TABLETS 2.5 MG/1
TAKE 1 TABLET (2.5 MG) BY ORAL ROUTE ONCE DAILY TABLET, FILM COATED ORAL 1
Qty: 0 | Refills: 0 | Status: ACTIVE | COMMUNITY
Start: 1900-01-01

## 2022-04-27 RX ORDER — METHOTREXATE 25 MG/ML
INJECT 1 ML INTO THE MUSCLE EVERY 7 DAYS INJECTION, SOLUTION INTRA-ARTERIAL; INTRAMUSCULAR; INTRATHECAL; INTRAVENOUS
Qty: 4 VIALS | Refills: 6 | Status: ACTIVE | COMMUNITY

## 2022-04-27 RX ORDER — RABEPRAZOLE SODIUM 20 MG/1
TAKE 1 TABLET BY MOUTH TWICE DAILY TABLET, DELAYED RELEASE ORAL
Qty: 180 | Refills: 3 | Status: ACTIVE | COMMUNITY

## 2022-04-27 RX ORDER — RABEPRAZOLE SODIUM 20 MG/1
TAKE 1 TABLET TABLET, DELAYED RELEASE ORAL TWICE DAILY
Qty: 180 TABLETS | Refills: 6 | Status: ACTIVE | COMMUNITY
Start: 2019-02-17

## 2022-04-27 RX ORDER — DIPHENOXYLATE HYDROCHLORIDE AND ATROPINE SULFATE 2.5; .025 MG/1; MG/1
TAKE 2 TABLETS TABLET ORAL
Qty: 120 TABLETS | Refills: 11 | Status: ACTIVE | COMMUNITY
Start: 2021-06-02 | End: 2022-05-28

## 2022-04-27 RX ORDER — METHOTREXATE 2.5 MG/1
TAKE 5 TABLETS TABLET ORAL
Qty: 60 TABLETS | Refills: 5 | Status: ACTIVE | COMMUNITY
Start: 2021-08-13

## 2022-04-27 RX ORDER — ALENDRONATE SODIUM 70 MG
TABLET ORAL
Qty: 0 | Refills: 0 | Status: ACTIVE | COMMUNITY
Start: 1900-01-01

## 2022-04-27 RX ORDER — VEDOLIZUMAB 300 MG/5ML
AS DIRECTED INJECTION, POWDER, LYOPHILIZED, FOR SOLUTION INTRAVENOUS
Qty: 300 MILLIGRAMS | Refills: 0 | Status: ACTIVE | COMMUNITY
Start: 2022-02-08 | End: 2022-05-09

## 2022-04-27 RX ORDER — IBUPROFEN 200 MG/1
TAKE 1 CAPSULE (200 MG) BY ORAL ROUTE EVERY 6 HOURS AS NEEDED CAPSULE, LIQUID FILLED ORAL
Qty: 0 | Refills: 0 | Status: ACTIVE | COMMUNITY
Start: 1900-01-01

## 2022-04-27 RX ORDER — VEDOLIZUMAB 300 MG/5ML
INFUSE 300 MG OVER 30 MINUTE(S) BY INTRAVENOUS ROUTE EVERY 4 WEEKS INJECTION, POWDER, LYOPHILIZED, FOR SOLUTION INTRAVENOUS
Qty: 1 | Refills: 11 | Status: ACTIVE | COMMUNITY
Start: 1900-01-01

## 2022-04-27 RX ORDER — DICYCLOMINE HYDROCHLORIDE 20 MG/1
TAKE 2 TABLETS TABLET ORAL
Qty: 240 TABLETS | Refills: 11 | Status: ACTIVE | COMMUNITY
Start: 2022-02-14 | End: 2023-02-12

## 2022-04-27 RX ORDER — LIDOCAINE HYDROCHLORIDE 20 MG/ML
SWISH AND SPIT 15ML BY MOUTH THREE TIMES A DAY SOLUTION ORAL; TOPICAL
Qty: 800 MILLILITER | Refills: 2 | Status: ACTIVE | COMMUNITY

## 2022-04-27 RX ORDER — BUDESONIDE 3 MG/1
2 CAPSULES CAPSULE, COATED PELLETS ORAL ONCE A DAY
Qty: 60 CAPSULES | Refills: 5 | Status: ACTIVE | COMMUNITY
Start: 2022-03-21

## 2022-04-27 RX ORDER — IBUPROFEN 100 MG/1
TAKE 2 TABLETS (200 MG) BY ORAL ROUTE EVERY 6 HOURS AS NEEDED WITH FOOD TABLET, COATED ORAL
Qty: 0 | Refills: 0 | Status: ACTIVE | COMMUNITY
Start: 1900-01-01

## 2022-04-27 RX ORDER — CYANOCOBALAMIN 1000 UG/ML
INJECT 1 ML INTRAMUSCULAR INJECTION INTRAMUSCULAR; SUBCUTANEOUS
Qty: 12 VIALS | Refills: 3 | Status: ACTIVE | COMMUNITY
Start: 2018-07-02 | End: 2022-05-28

## 2022-05-05 ENCOUNTER — TELEPHONE ENCOUNTER (OUTPATIENT)
Dept: URBAN - METROPOLITAN AREA CLINIC 92 | Facility: CLINIC | Age: 57
End: 2022-05-05

## 2022-05-09 LAB
APPEARANCE: CLEAR
BACTERIA: (no result)
BILIRUBIN: NEGATIVE
CAST TYPE: (no result)
CASTS: (no result)
COMMENT: (no result)
CRYSTAL TYPE: (no result)
CRYSTALS: (no result)
EPITHELIAL CELLS (NON RENAL): (no result)
EPITHELIAL CELLS (RENAL): (no result)
GLUCOSE: NEGATIVE
KETONES: NEGATIVE
Lab: (no result)
MICROSCOPIC EXAMINATION: (no result)
MICROSCOPIC EXAMINATION: (no result)
MUCUS THREADS: (no result)
NITRITE, URINE: NEGATIVE
OCCULT BLOOD: NEGATIVE
PH: 6.5
PROTEIN: (no result)
RBC: (no result)
SPECIFIC GRAVITY: 1.02
TRICHOMONAS: (no result)
URINE CULTURE, ROUTINE: (no result)
URINE-COLOR: YELLOW
UROBILINOGEN,SEMI-QN: 0.2
WBC ESTERASE: NEGATIVE
WBC: (no result)
YEAST: (no result)

## 2022-05-10 ENCOUNTER — TELEPHONE ENCOUNTER (OUTPATIENT)
Dept: URBAN - METROPOLITAN AREA CLINIC 98 | Facility: CLINIC | Age: 57
End: 2022-05-10

## 2022-05-25 ENCOUNTER — OFFICE VISIT (OUTPATIENT)
Dept: URBAN - METROPOLITAN AREA CLINIC 97 | Facility: CLINIC | Age: 57
End: 2022-05-25
Payer: COMMERCIAL

## 2022-05-25 VITALS
RESPIRATION RATE: 18 BRPM | TEMPERATURE: 96.2 F | BODY MASS INDEX: 18.96 KG/M2 | WEIGHT: 128 LBS | HEIGHT: 69 IN | SYSTOLIC BLOOD PRESSURE: 100 MMHG | HEART RATE: 92 BPM | DIASTOLIC BLOOD PRESSURE: 60 MMHG

## 2022-05-25 DIAGNOSIS — K50.90 CROHN DISEASE: ICD-10-CM

## 2022-05-25 PROCEDURE — 96413 CHEMO IV INFUSION 1 HR: CPT | Performed by: INTERNAL MEDICINE

## 2022-05-25 RX ORDER — IBUPROFEN 200 MG/1
TAKE 1 CAPSULE (200 MG) BY ORAL ROUTE EVERY 6 HOURS AS NEEDED CAPSULE, LIQUID FILLED ORAL
Qty: 0 | Refills: 0 | Status: ACTIVE | COMMUNITY
Start: 1900-01-01

## 2022-05-25 RX ORDER — RABEPRAZOLE SODIUM 20 MG/1
TAKE 1 TABLET TABLET, DELAYED RELEASE ORAL TWICE DAILY
Qty: 180 TABLETS | Refills: 6 | Status: ACTIVE | COMMUNITY
Start: 2019-02-17

## 2022-05-25 RX ORDER — IBUPROFEN 100 MG/1
TAKE 2 TABLETS (200 MG) BY ORAL ROUTE EVERY 6 HOURS AS NEEDED WITH FOOD TABLET, COATED ORAL
Qty: 0 | Refills: 0 | Status: ACTIVE | COMMUNITY
Start: 1900-01-01

## 2022-05-25 RX ORDER — PROMETHAZINE HYDROCHLORIDE 25 MG/1
TAKE 1 TABLETS TABLET ORAL BID
Qty: 60 TABLETS | Refills: 5 | Status: ACTIVE | COMMUNITY
Start: 2021-02-12

## 2022-05-25 RX ORDER — BUDESONIDE 3 MG/1
2 CAPSULES CAPSULE, COATED PELLETS ORAL ONCE A DAY
Qty: 60 CAPSULES | Refills: 5 | Status: ACTIVE | COMMUNITY
Start: 2022-03-21

## 2022-05-25 RX ORDER — DIPHENOXYLATE HYDROCHLORIDE AND ATROPINE SULFATE 2.5; .025 MG/1; MG/1
TAKE 2 TABLETS TABLET ORAL
Qty: 120 TABLETS | Refills: 11 | Status: ACTIVE | COMMUNITY
Start: 2021-06-02 | End: 2022-05-28

## 2022-05-25 RX ORDER — RABEPRAZOLE SODIUM 20 MG/1
TAKE 1 TABLET BY MOUTH TWICE DAILY TABLET, DELAYED RELEASE ORAL
Qty: 180 | Refills: 3 | Status: ACTIVE | COMMUNITY

## 2022-05-25 RX ORDER — METHOTREXATE 25 MG/ML
INJECT 1 ML INTO THE MUSCLE EVERY 7 DAYS INJECTION, SOLUTION INTRA-ARTERIAL; INTRAMUSCULAR; INTRATHECAL; INTRAVENOUS
Qty: 4 VIALS | Refills: 6 | Status: ACTIVE | COMMUNITY

## 2022-05-25 RX ORDER — PREDNISONE 10 MG/1
TAKE 40 MG DAILY TABLET ORAL ONCE A DAY
Status: ACTIVE | COMMUNITY

## 2022-05-25 RX ORDER — DIPHENOXYLATE HCL/ATROPINE 2.5-.025MG
TAKE 2 TABLETS TABLET ORAL TWICE DAILY
Qty: 360 TABLETS | Refills: 1 | Status: ACTIVE | COMMUNITY

## 2022-05-25 RX ORDER — VITAMIN A 2400 MCG
1 TABLET CAPSULE ORAL ONCE A DAY
Qty: 90 TABLET | Refills: 1 | Status: ACTIVE | COMMUNITY
Start: 2022-01-12

## 2022-05-25 RX ORDER — DICYCLOMINE HYDROCHLORIDE 20 MG/1
TAKE 2 TABLETS TABLET ORAL
Qty: 240 TABLETS | Refills: 11 | Status: ACTIVE | COMMUNITY
Start: 2022-02-14 | End: 2023-02-12

## 2022-05-25 RX ORDER — CYANOCOBALAMIN 1000 UG/ML
INJECT 1 ML INTRAMUSCULAR INJECTION INTRAMUSCULAR; SUBCUTANEOUS
Qty: 12 VIALS | Refills: 3 | Status: ACTIVE | COMMUNITY
Start: 2018-07-02 | End: 2022-05-28

## 2022-05-25 RX ORDER — LIDOCAINE HYDROCHLORIDE 20 MG/ML
SWISH AND SPIT 15ML BY MOUTH THREE TIMES A DAY SOLUTION ORAL; TOPICAL
Qty: 800 MILLILITER | Refills: 2 | Status: ACTIVE | COMMUNITY

## 2022-05-25 RX ORDER — METHOTREXATE 2.5 MG/1
TAKE 5 TABLETS TABLET ORAL
Qty: 60 TABLETS | Refills: 5 | Status: ACTIVE | COMMUNITY
Start: 2021-08-13

## 2022-05-25 RX ORDER — LETROZOLE TABLETS 2.5 MG/1
TAKE 1 TABLET (2.5 MG) BY ORAL ROUTE ONCE DAILY TABLET, FILM COATED ORAL 1
Qty: 0 | Refills: 0 | Status: ACTIVE | COMMUNITY
Start: 1900-01-01

## 2022-05-25 RX ORDER — ALENDRONATE SODIUM 70 MG
TABLET ORAL
Qty: 0 | Refills: 0 | Status: ACTIVE | COMMUNITY
Start: 1900-01-01

## 2022-05-25 RX ORDER — VEDOLIZUMAB 300 MG/5ML
INFUSE 300 MG OVER 30 MINUTE(S) BY INTRAVENOUS ROUTE EVERY 4 WEEKS INJECTION, POWDER, LYOPHILIZED, FOR SOLUTION INTRAVENOUS
Qty: 1 | Refills: 11 | Status: ACTIVE | COMMUNITY
Start: 1900-01-01

## 2022-06-15 ENCOUNTER — TELEPHONE ENCOUNTER (OUTPATIENT)
Dept: URBAN - METROPOLITAN AREA CLINIC 98 | Facility: CLINIC | Age: 57
End: 2022-06-15

## 2022-06-15 RX ORDER — BUDESONIDE 9 MG/1
1 TABLET IN THE MORNING TABLET, FILM COATED, EXTENDED RELEASE ORAL ONCE A DAY
Qty: 30 TABLETS | Refills: 2 | OUTPATIENT
Start: 2022-06-15 | End: 2022-09-13

## 2022-06-17 ENCOUNTER — WEB ENCOUNTER (OUTPATIENT)
Dept: URBAN - METROPOLITAN AREA CLINIC 97 | Facility: CLINIC | Age: 57
End: 2022-06-17

## 2022-06-18 ENCOUNTER — ERX REFILL RESPONSE (OUTPATIENT)
Dept: URBAN - METROPOLITAN AREA CLINIC 98 | Facility: CLINIC | Age: 57
End: 2022-06-18

## 2022-06-18 RX ORDER — PROMETHAZINE HYDROCHLORIDE 6.25 MG/5ML
TAKE 5 ML BY MOUTH AT BEDTIME SOLUTION ORAL
Qty: 150 MILLILITER | Refills: 2 | OUTPATIENT

## 2022-06-18 RX ORDER — PROMETHAZINE HYDROCHLORIDE 6.25 MG/5ML
TAKE 5 ML BY MOUTH AT BEDTIME SOLUTION ORAL
Qty: 150 MILLILITER | Refills: 1 | OUTPATIENT

## 2022-06-20 ENCOUNTER — TELEPHONE ENCOUNTER (OUTPATIENT)
Dept: URBAN - METROPOLITAN AREA CLINIC 98 | Facility: CLINIC | Age: 57
End: 2022-06-20

## 2022-06-22 ENCOUNTER — OFFICE VISIT (OUTPATIENT)
Dept: URBAN - METROPOLITAN AREA CLINIC 97 | Facility: CLINIC | Age: 57
End: 2022-06-22
Payer: COMMERCIAL

## 2022-06-22 VITALS
WEIGHT: 128 LBS | SYSTOLIC BLOOD PRESSURE: 95 MMHG | BODY MASS INDEX: 18.96 KG/M2 | RESPIRATION RATE: 18 BRPM | TEMPERATURE: 96.1 F | HEART RATE: 89 BPM | DIASTOLIC BLOOD PRESSURE: 50 MMHG | HEIGHT: 69 IN

## 2022-06-22 DIAGNOSIS — K50.80 CROHN'S COLITIS: ICD-10-CM

## 2022-06-22 PROCEDURE — 96365 THER/PROPH/DIAG IV INF INIT: CPT | Performed by: INTERNAL MEDICINE

## 2022-06-22 RX ORDER — PROMETHAZINE HYDROCHLORIDE 25 MG/1
TAKE 1 TABLETS TABLET ORAL BID
Qty: 60 TABLETS | Refills: 5 | Status: ACTIVE | COMMUNITY
Start: 2021-02-12

## 2022-06-22 RX ORDER — VEDOLIZUMAB 300 MG/5ML
INFUSE 300 MG OVER 30 MINUTE(S) BY INTRAVENOUS ROUTE EVERY 4 WEEKS INJECTION, POWDER, LYOPHILIZED, FOR SOLUTION INTRAVENOUS
Qty: 1 | Refills: 11 | Status: ACTIVE | COMMUNITY
Start: 1900-01-01

## 2022-06-22 RX ORDER — RABEPRAZOLE SODIUM 20 MG/1
TAKE 1 TABLET BY MOUTH TWICE DAILY TABLET, DELAYED RELEASE ORAL
Qty: 180 | Refills: 3 | Status: ACTIVE | COMMUNITY

## 2022-06-22 RX ORDER — IBUPROFEN 200 MG/1
TAKE 1 CAPSULE (200 MG) BY ORAL ROUTE EVERY 6 HOURS AS NEEDED CAPSULE, LIQUID FILLED ORAL
Qty: 0 | Refills: 0 | Status: ACTIVE | COMMUNITY
Start: 1900-01-01

## 2022-06-22 RX ORDER — VITAMIN A 2400 MCG
1 TABLET CAPSULE ORAL ONCE A DAY
Qty: 90 TABLET | Refills: 1 | Status: ACTIVE | COMMUNITY
Start: 2022-01-12

## 2022-06-22 RX ORDER — BUDESONIDE 3 MG/1
2 CAPSULES CAPSULE, COATED PELLETS ORAL ONCE A DAY
Qty: 60 CAPSULES | Refills: 5 | Status: ACTIVE | COMMUNITY
Start: 2022-03-21

## 2022-06-22 RX ORDER — PREDNISONE 10 MG/1
TAKE 40 MG DAILY TABLET ORAL ONCE A DAY
Status: ACTIVE | COMMUNITY

## 2022-06-22 RX ORDER — BUDESONIDE 9 MG/1
1 TABLET IN THE MORNING TABLET, FILM COATED, EXTENDED RELEASE ORAL ONCE A DAY
Qty: 30 TABLETS | Refills: 2 | Status: ACTIVE | COMMUNITY
Start: 2022-06-15 | End: 2022-09-13

## 2022-06-22 RX ORDER — LIDOCAINE HYDROCHLORIDE 20 MG/ML
SWISH AND SPIT 15ML BY MOUTH THREE TIMES A DAY SOLUTION ORAL; TOPICAL
Qty: 800 MILLILITER | Refills: 2 | Status: ACTIVE | COMMUNITY

## 2022-06-22 RX ORDER — METHOTREXATE 2.5 MG/1
TAKE 5 TABLETS TABLET ORAL
Qty: 60 TABLETS | Refills: 5 | Status: ACTIVE | COMMUNITY
Start: 2021-08-13

## 2022-06-22 RX ORDER — DIPHENOXYLATE HCL/ATROPINE 2.5-.025MG
TAKE 2 TABLETS TABLET ORAL TWICE DAILY
Qty: 360 TABLETS | Refills: 1 | Status: ACTIVE | COMMUNITY

## 2022-06-22 RX ORDER — IBUPROFEN 100 MG/1
TAKE 2 TABLETS (200 MG) BY ORAL ROUTE EVERY 6 HOURS AS NEEDED WITH FOOD TABLET, COATED ORAL
Qty: 0 | Refills: 0 | Status: ACTIVE | COMMUNITY
Start: 1900-01-01

## 2022-06-22 RX ORDER — PROMETHAZINE HYDROCHLORIDE 6.25 MG/5ML
TAKE 5 ML BY MOUTH AT BEDTIME SOLUTION ORAL
Qty: 150 MILLILITER | Refills: 1 | Status: ACTIVE | COMMUNITY

## 2022-06-22 RX ORDER — RABEPRAZOLE SODIUM 20 MG/1
TAKE 1 TABLET TABLET, DELAYED RELEASE ORAL TWICE DAILY
Qty: 180 TABLETS | Refills: 6 | Status: ACTIVE | COMMUNITY
Start: 2019-02-17

## 2022-06-22 RX ORDER — LETROZOLE TABLETS 2.5 MG/1
TAKE 1 TABLET (2.5 MG) BY ORAL ROUTE ONCE DAILY TABLET, FILM COATED ORAL 1
Qty: 0 | Refills: 0 | Status: ACTIVE | COMMUNITY
Start: 1900-01-01

## 2022-06-22 RX ORDER — METHOTREXATE 25 MG/ML
INJECT 1 ML INTO THE MUSCLE EVERY 7 DAYS INJECTION, SOLUTION INTRA-ARTERIAL; INTRAMUSCULAR; INTRATHECAL; INTRAVENOUS
Qty: 4 VIALS | Refills: 6 | Status: ACTIVE | COMMUNITY

## 2022-06-22 RX ORDER — ALENDRONATE SODIUM 70 MG
TABLET ORAL
Qty: 0 | Refills: 0 | Status: ACTIVE | COMMUNITY
Start: 1900-01-01

## 2022-06-22 RX ORDER — DICYCLOMINE HYDROCHLORIDE 20 MG/1
TAKE 2 TABLETS TABLET ORAL
Qty: 240 TABLETS | Refills: 11 | Status: ACTIVE | COMMUNITY
Start: 2022-02-14 | End: 2023-02-12

## 2022-07-13 ENCOUNTER — OFFICE VISIT (OUTPATIENT)
Dept: URBAN - METROPOLITAN AREA CLINIC 98 | Facility: CLINIC | Age: 57
End: 2022-07-13
Payer: COMMERCIAL

## 2022-07-13 ENCOUNTER — WEB ENCOUNTER (OUTPATIENT)
Dept: URBAN - METROPOLITAN AREA CLINIC 98 | Facility: CLINIC | Age: 57
End: 2022-07-13

## 2022-07-13 VITALS
WEIGHT: 126.8 LBS | HEIGHT: 69 IN | BODY MASS INDEX: 18.78 KG/M2 | TEMPERATURE: 97 F | DIASTOLIC BLOOD PRESSURE: 51 MMHG | HEART RATE: 105 BPM | SYSTOLIC BLOOD PRESSURE: 88 MMHG

## 2022-07-13 DIAGNOSIS — R19.7 DIARRHEA: ICD-10-CM

## 2022-07-13 DIAGNOSIS — K80.20 CHOLELITHIASIS: ICD-10-CM

## 2022-07-13 DIAGNOSIS — R10.84 ABDOMINAL PAIN, GENERALIZED: ICD-10-CM

## 2022-07-13 DIAGNOSIS — K50.80 CROHN'S DISEASE OF BOTH SMALL AND LARGE INTESTINE WITHOUT COMPLICATIONS: ICD-10-CM

## 2022-07-13 PROCEDURE — 99215 OFFICE O/P EST HI 40 MIN: CPT | Performed by: INTERNAL MEDICINE

## 2022-07-13 RX ORDER — RABEPRAZOLE SODIUM 20 MG/1
TAKE 1 TABLET BY MOUTH TWICE DAILY TABLET, DELAYED RELEASE ORAL
Qty: 180 | Refills: 3 | Status: ACTIVE | COMMUNITY

## 2022-07-13 RX ORDER — DIPHENOXYLATE HCL/ATROPINE 2.5-.025MG
TAKE 2 TABLETS TABLET ORAL TWICE DAILY
Qty: 360 TABLETS | Refills: 1 | Status: ACTIVE | COMMUNITY

## 2022-07-13 RX ORDER — VEDOLIZUMAB 300 MG/5ML
INFUSE 300 MG OVER 30 MINUTE(S) BY INTRAVENOUS ROUTE EVERY 4 WEEKS INJECTION, POWDER, LYOPHILIZED, FOR SOLUTION INTRAVENOUS
Qty: 1 | Refills: 11 | Status: ACTIVE | COMMUNITY
Start: 1900-01-01

## 2022-07-13 RX ORDER — IBUPROFEN 200 MG/1
TAKE 1 CAPSULE (200 MG) BY ORAL ROUTE EVERY 6 HOURS AS NEEDED CAPSULE, LIQUID FILLED ORAL
Qty: 0 | Refills: 0 | Status: ACTIVE | COMMUNITY
Start: 1900-01-01

## 2022-07-13 RX ORDER — IBUPROFEN 100 MG/1
TAKE 2 TABLETS (200 MG) BY ORAL ROUTE EVERY 6 HOURS AS NEEDED WITH FOOD TABLET, COATED ORAL
Qty: 0 | Refills: 0 | Status: ACTIVE | COMMUNITY
Start: 1900-01-01

## 2022-07-13 RX ORDER — DICYCLOMINE HYDROCHLORIDE 20 MG/1
TAKE 2 TABLETS TABLET ORAL
Qty: 240 TABLETS | Refills: 11 | Status: ACTIVE | COMMUNITY
Start: 2022-02-14 | End: 2023-02-12

## 2022-07-13 RX ORDER — PROMETHAZINE HYDROCHLORIDE 25 MG/1
TAKE 1 TABLETS TABLET ORAL BID
Qty: 60 TABLETS | Refills: 5 | Status: ACTIVE | COMMUNITY
Start: 2021-02-12

## 2022-07-13 RX ORDER — VITAMIN A 2400 MCG
1 TABLET CAPSULE ORAL ONCE A DAY
Qty: 90 TABLET | Refills: 1 | Status: ACTIVE | COMMUNITY
Start: 2022-01-12

## 2022-07-13 RX ORDER — PREDNISONE 10 MG/1
TAKE 40 MG DAILY TABLET ORAL ONCE A DAY
Status: ACTIVE | COMMUNITY

## 2022-07-13 RX ORDER — RABEPRAZOLE SODIUM 20 MG/1
TAKE 1 TABLET TABLET, DELAYED RELEASE ORAL TWICE DAILY
Qty: 180 TABLETS | Refills: 6 | Status: ACTIVE | COMMUNITY
Start: 2019-02-17

## 2022-07-13 RX ORDER — ALENDRONATE SODIUM 70 MG
TABLET ORAL
Qty: 0 | Refills: 0 | Status: ACTIVE | COMMUNITY
Start: 1900-01-01

## 2022-07-13 RX ORDER — METHOTREXATE 25 MG/ML
INJECT 1 ML INTO THE MUSCLE EVERY 7 DAYS INJECTION, SOLUTION INTRA-ARTERIAL; INTRAMUSCULAR; INTRATHECAL; INTRAVENOUS
Qty: 4 VIALS | Refills: 6 | Status: ACTIVE | COMMUNITY

## 2022-07-13 RX ORDER — BUDESONIDE 9 MG/1
1 TABLET IN THE MORNING TABLET, FILM COATED, EXTENDED RELEASE ORAL ONCE A DAY
Qty: 30 TABLETS | Refills: 2 | Status: ACTIVE | COMMUNITY
Start: 2022-06-15 | End: 2022-09-13

## 2022-07-13 RX ORDER — LIDOCAINE HYDROCHLORIDE 20 MG/ML
SWISH AND SPIT 15ML BY MOUTH THREE TIMES A DAY SOLUTION ORAL; TOPICAL
Qty: 800 MILLILITER | Refills: 2 | Status: ACTIVE | COMMUNITY

## 2022-07-13 RX ORDER — LETROZOLE TABLETS 2.5 MG/1
TAKE 1 TABLET (2.5 MG) BY ORAL ROUTE ONCE DAILY TABLET, FILM COATED ORAL 1
Qty: 0 | Refills: 0 | Status: ACTIVE | COMMUNITY
Start: 1900-01-01

## 2022-07-13 NOTE — HPI-TODAY'S VISIT:
56 yo female with severe CD on Entyvio 300 mg iv q 4 w and mtx 12.5 mg sq q week Budesonide 3 mg a day and folic acid.  Main complaints are: Mouth ulcers Abdominal pain Diarrhea- empties bad 7 times Bad day more than 10/ No blood in stool. Nausea - continuous - awakens with nausea. Eats and nausea. PPI- Aciphex 20 mg bid. Trazodone. No other meds  Next infusion next week.   ======================= 22  55 yo female comes in for urgent f/u re; need for Promethazine 6.25 mg/5ml solution as this works better for her when she has acute nausea and nausea unrelieved by ondansetron. I feel this is an appropriate need and should be approved by insurance even though it is non-formulary.  MRE is pending and plan per radiology is to use barium as volumen as she vomits with it.  She experiences nausea every day due to bowel dysfunction from active Crohns disease and other factors.  Meds: Entyvio 300 mg iv q 4 w Methotrexate 1 cc per week.  Wants to reduce to .5 cc per week and that is fine. Folic acid 1 mg a da\7 Budesonide 3 mg now, reduced from 9 m,g =============================== 22  55 yo female returns for 5 week f/u. Stoma is fully healed. No issues with the appliances. Emptying bag 5-6 times a day and coping.  She has been having abdominal pain for the past few weeks. It felt like someone was "trying to pull the uterus out" and she calls it scar tissue. There was nothing on US, to cause the pain. MRE from 21 showed ??problable low grade partial SBO but this may be dysfunction.  She came to see me for the iron deficiency anemia. She has noticed some bleeding.  She still has urgency to move her bowels. Yesterday was scary becasue she saw blood in the rectal fluid. Passes rectal fluid 8-10 times a day. At night, it can be horrendous. Bottom of diaper is full.  On Entyvio 300 mg iv q 4 Methotrexate 1 cc q week Budesonide -- 3m 3 per day.  -Was feeling bad 6 months ago or so and Bud was started. May be worth trying to taper   ============================= 22  55 yo female with CD and ileostomy comes in for 3 month f/u visit.  Finally healing from stoma revision 10/15/21.  Stoma never stuck out enough and kept   Dr. Talamantes did a stoma revision.  Mild "complication" as skin around the stoma, stoma had a "moat" around it.  Empties bag 5-6 times. Interval check. Eating ok. Mouth is healing from the leucovorin to lessen the effect of mtx on mouth.  Getting guidance from Myerson.  ===================================== 10/6/21  55 yo female with Crohn's and avascular necrosis  and saw Dr. Myerson 2 days ago and he did blood work has some plans - f/u in 3 weeks.  Sees Dr. Gardiner tomorrow to address major ostomy issues.  Considering ostomy revision.  Retracted stoma.  Saw her mother and needed more supplies due to leaking.  Entocort now for 10 weeks, 6 mg a day, empties ostomy 6-9 times.  Has leaked but not in recent weeks.  Never on 9 mg and capri 6 mg for 10 weeks.  Try alternating 6 mg and 3 mg on alternate days.    ========================  21  55 yo female with CD now for 1 month f/u after telehealth and doing better 5 weeks into course of Entocort tx.  Does havd avascular necrosis of hips and understands there are risks of similar complications with Entocort therapy.  Was on fosamax. I just called Dr. Myerson to arrange an appoinment.  Ostomy - empties it 6 times so far today. Occasional abdominal pain.  On Entyvio every 4 weeks Entocort 9 mg a day x 5 weeks.  Asked to have CD4/CD8 checked, by sister's immunologist.   ===================== 8/3/21  55 yo female for  f/u due to beeing weak and in bed. In ER last week and they sent her home.  Meds: Entyvio 300 mg q 4 weeks Entocort  x 1 week Off prednisone - had been on prednisone- she does not think it is what was helping her. On 1 cc mtx per week.  Devin Auguste is primary MD Saw him a month ago.  Main syx now.5 days ago was throwing up and was so distended. Could not poke her stomach. went to the ER Wednesday. N/V. Aciphex 1 twice daily. Last looked  Never had an ulcer. Ileostomy in place.  Fever on Friday, up to 101. Eating now. Hungry yesterday  Not vomiting.  May neede EGD.     ====================== 21  56 yo female comes in for f/u.  She is "off" per Dr. Jaquez.  She did an MRI. Dr. Jaquez reportedly thinks it is her meds. Has not seen a neurologist in years. Recent  labs were nl mostly with Hb 10.1, nl K-4.0 and Ca. Nl ESR and CRP, but had just finished medrol dose eliza.  Wants a Mg level. Labs to be sent to [facility].  Nl quantiferon.  Currently , getting Entyvio every 4 weeks.  Getting entyvio every 4 weeks. Taking 2 weeks off mtx due to stomatitis. If resuming, would reduce from 1 cc to .5 cc..  She is not sure how much she needs mtx and how much it has helped in the past. Mtx has very likely caused the stomatis. Or the pred medrol could have helped the mouth and belly. She has avn all over.  Myerson will see her and order bone scan  Addendum ------21 Has thicker stools Has to change her bag because of high outputs She says her stoma is an outie  She is on pred 20 mg and should taper off it by going to 10 mg to 5 mg for a week and then d/c Risks of prednisone discussed.  Had dropped mtx from 25 to 12.5 and will go back up to 25 mg a week. Consider [facility] referral if not improvimg at that point. ============ 21  56 yo female with CD and ostomy for 5 month f/u.  Abd is good. Ostomy is functioning. Sleep thru much of night.  Appetite and intake is better. Oral CD problematic. Has been in touch with Dr. Pepe ---- swish and spit.  Max-- 150. Current weight is 121.  Not taking supplements --  Boost and Ensure. 2-3 cans The y don't agree with her.. R Lactose free ice  She wants low dose prednisone. Lets try 2.5-5 mg a day.  ================== 20  56 yo female for 3 week TH f/u. Has upper abdominal pain that may be due to gallstones. Had referred her to Dr. Moreland but she did not call. She heard he was retiring and not. Has abdominal pain when she. Left upper quadrant or "upper rib cage" mostly left. He knows Dr. Talamantes. -  Frustrated---- blisters on lip. Torn open. Has not tried steroid rinse or paste in past. Magic  mouth wash helps. Does not reduce the blisters. Dr. Best- non steroid options for mouth   ======================== 10/23/20  10/23 56 yo female for 3 week TH f/u. Has oral Crohn's and I-C and stoma issues. Entyvio may not cover the EIM. Might try oral steroid lozenge --- pred paste and lozenges.  Entyvio 300 q 4 weeks Mtx 1 cc q week Pred 5 mg may stop. Bentyl -  Abd pain is ok No idea what triggered her admission Food induced -   Consider medrol dose eliza in the future  Extensive lab review CBC --- Hct 31.6/Hb 10.9 plt 120 CRP 22 ESR 56 Iron 33% Ferrintin good ================================================== 10/2/20  56 yo female with  Crohn's disease recently hosp 3 days at  for abd pain, N/V and got better with 3 dasy 3 days of iv steroids.  Surgery considered.  Was on 40 mg pred - intolerant anxiety  Now dropped to 30 mg a day. Now is 50-60% better than admission.  Surgery debated gallstones. They leaned toward blockage.  Has been on Stelara. Entyvio works better than Stelara. Mtx makes her tired She would like to up the dose of lomotil.  20  53 yo female with Crohnn's disease having continued active syx. Getting Entyvio every 4 weeks. Methotrexate 12.5 mg per week Dicyclomine tablets 2 po qid  == She has taken Xifaxan and it tears her up.   Then stop it. Does not like flagyl and does not help.  Pepto 1-2 doses a day, does help the diarreha. She uses Immodium every day.  Stooling ----- empties bag 6-10 times a day. Abdominal pain - when she eats and intermittent. Energy - gone.  Labs- Anemia - Hb lower CRP is higher ---34 (was 16).   ========================== 20  Crohn's disease.     53 yo female with Crohn's disease comes in for f/u.  Dr. Jaquez is considering a GYN etiology.  Needs a pouch exam.  Getting Entyvio every 4 weeks.  Stelara did not work as well as Entyvio.  Normally when she gets an infusion, she gets a bounce and has not.  Prometheus Vedo level 3/25, just before 4 week dose was great_ __ _32.5 and no antibodies.  Feb 10 labs looked good CRP 29.9 and cbc looked fine.  ESR elevation.  2 liters of miralax_ __ _ Try 6-8am _ __ _- and then an exam at 2-3 pm She asked about a pilonidal cyst and I directed to Dr. Albin Owen  Try pepto bismol 1-2 prn on days.    ===== 20  53 yo female comes in at my request for 3 week f/u of Crohn's due to new 4 x eleation of CRP at 29.9.  19 CRP was similarly elevated 4x at 2.1/.5  Leonid is checking CT abd-pelvis due to pelvic pain which is not characteristic of her Crohn's.  She is wondering about GYN.  Or spastic bladder.  Dr. Talamantes thought stable but wants to scope to be sure.  Empties bag 10x a day.That is more than 3-6 months ago and has leakage of the ostomy bag. Maybe more volume.  Very watery.  Review of labs from 2/10 show cRP 29.i9 but others are stable. CBC looks good with trace elevation of wbc and borderline anemia. Getting iv iron from Arianna Jaquez. Vit D is normal at 39. Albumin is normal at 4.5.  MRI 10/1/19 showed gallstones , present in past, and she has mild pain that is intermittent Liver tests are nl. Can assess with HIDA, and/or with surgical consult, e.g ???relative need for choly, high vs. low.    ================ 20  53 yo female with severe Crohn's disease and h/o breast cancer _ _ Arianna Jaquez MD on Letrasol. 10th year.  Abdominal pain is confusing.  Has gotten pain in the ovary and in the uterus. GYN-Ann Laird MD and did US was fine. Sent to PT for pelvic treatment.  At her comment, I reviewed [facility] imaging 16 nl CTE 5/3/17 nl [facility] US Oct 2 2019 "Cholelithiasis"   o/w MRE was nl - no inflammation. BUT THE MRI DID SHOW "PERSISTENT TETHERING OF UTERUS TO RECTAL STUMP"  On Entyvio 300 mg q 4 weeks.  Next dose in 2 weeks On mtx _ _.5 cc. Monthly b12 shots  Eating ok, some weight loss. Weight stable x 6 months and down 30 lb since _ __ _- she is comfortable with current weight.  ============= 19  53 yo female with severe Crohn's and h/o breast cancer comes in for 3 month f/u.  Doing poorly.  Daily abd pain which is intermittent.  When she eats she gets pain and nausea.  Taking zofran and phenergan.  Sleep pattern is awry.  Empties bag 5 times a day and going out of rectum.  Has a loop ileostomy that may be problematic.  Allergic to tape.  Currently on Entyvio 300 mg q 8 weeks.  Stelara did not work as well.  Discussed the entyvio at shorter cycles of q 4 weeks to see if that will control syx better.  Outputs are higher and she may be volume depleted and maybe more entyvio will help.  last entyvio was last week and so a level won't be useful today.  Will just proceed with dose increase.    ============== 18 53 yo female with Crohn's disease and breast cancer and diverting ileostomy from Dr. Talamantes.  Skin issue - addressed by Dr. Best.  See Brooke Talamantes MD and enterostomal therapist.  AGWS.  No fcs.      Review of Systems  ConstitutionalDenies : body aches, fever, weight gain, weight loss  CardiovascularDenies : chest pain, heart racing/skipping, high blood pressure  RespiratoryDenies : chronic cough, shortness of breath, wheezing or asthma symptoms  GastrointestinalDenies : Abdominal Pain/discomfort, Anal/Rectal Pain or Itching, Anal Spasm, Black Stool, Bloating/belching/gaseouness, Change of Bowel Habit, Constipation, Diarrhea/Loose Stool, Difficulty in Swallowing, Heartburn/esophageal reflux, Hemorrhoids, Indigestion, Mucus in Stool, Nausea/vomiting, Rectal Bleeding, Unintentional Weight Loss    Vitals  Date Time BP Position Site L\R Cuff Size HR RR TEMP (F) WT  HT  BMI kg/m2 BSA m2 O2 Sat HC     2020 05:15 PM         133lbs 0oz 5'  8.5" 19.93 1.71          Assessment  Crohn's Disease     555.2  Diarrhea     787.91/R19.7  Cholelithiasis     574.20/K80.20  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 () - - 2020  EGD w/Biopsy if indicated (11536) - - 2020  Pouchoscopy (02301) - - 2020  CMP (comprehensive metabolic panel) (56629) - - 2020  Sed rate (71862) - - 2020  C-reactive protein (85041) - - 2020  Ferritin assay (96621) - - 2020  Iron + iron binding capacity panel ser/plas (54501, 32158) - - 2020  Vitamin B12 and folate measurement (92582, 70525) - - 2020  CBC with diff (80022) - - 2020  25-OH-Vitamin D (38648) - - 2020  GPP 22 (FilmArray) - Gastrointestinal Pathogen Panel - QDx (08545) - - 2020  MedicationsMedications have been Reconciled  Transition of Care or Provider Policy  InstructionsPatient seen today via telehealth by agreement and consent of patient in light of current COVID-19 pandemic. I used video conferencing during the visit. The patient encounter is appropriate and reasonable under the circumstances given the patient's particular presentation at this time. The patient has been advised of the followin) the potential risks and limitations of this mode of treatment (including but not limited to the absence of in-person examination); 2) the right to refuse telehealth services at any point without affecting the right to future care; 3) the right to receive in-person services, included immediately after this consultation if an urgent need arises; 4) information, including identifiable images or information from this telehealth consult, will only be shared in accordance with HIPPA regulations. Any and all of the patient's and/or patient's family member's questions on this issue have been answered. The patient has verbally consented to be treated via telehealth services. The patient has also been advised to contact this office for worsening conditions or problems, and seek emergency medical treatment and/or call 911 if the patient deems either necessary.  (For commercial payers, telehealth video only) More than half of the face-to-face time used for counseling and coordination of care.  40 min appointment (43491 EP)  I have documented a list of current medications and reviewed it with the patient.  I encouraged the patient to diet and exercise.  DispositionReturn To Clinic in 2 Months  CorrespondenceCC this document (Slade Auguste MD, Brooke Talamantes MD, Arianna Jaquez MD) - 2020    42049 exam med 32 min time  needs refill or lomotil Can't eRx lomotil.  Aciphex refill.  Needs EGD and pouchoscopy         Electronically Signed by: MD MIGUEL A Carpio

## 2022-07-20 ENCOUNTER — OFFICE VISIT (OUTPATIENT)
Dept: URBAN - METROPOLITAN AREA CLINIC 97 | Facility: CLINIC | Age: 57
End: 2022-07-20
Payer: COMMERCIAL

## 2022-07-20 VITALS
WEIGHT: 126 LBS | HEART RATE: 81 BPM | HEIGHT: 69 IN | BODY MASS INDEX: 18.66 KG/M2 | RESPIRATION RATE: 17 BRPM | SYSTOLIC BLOOD PRESSURE: 91 MMHG | DIASTOLIC BLOOD PRESSURE: 67 MMHG | TEMPERATURE: 96.2 F

## 2022-07-20 DIAGNOSIS — K50.80 CROHN'S COLITIS: ICD-10-CM

## 2022-07-20 PROCEDURE — 96365 THER/PROPH/DIAG IV INF INIT: CPT | Performed by: INTERNAL MEDICINE

## 2022-07-20 RX ORDER — DIPHENOXYLATE HCL/ATROPINE 2.5-.025MG
TAKE 2 TABLETS TABLET ORAL TWICE DAILY
Qty: 360 TABLETS | Refills: 1 | Status: ACTIVE | COMMUNITY

## 2022-07-20 RX ORDER — BUDESONIDE 9 MG/1
1 TABLET IN THE MORNING TABLET, FILM COATED, EXTENDED RELEASE ORAL ONCE A DAY
Qty: 30 TABLETS | Refills: 2 | Status: ACTIVE | COMMUNITY
Start: 2022-06-15 | End: 2022-09-13

## 2022-07-20 RX ORDER — LIDOCAINE HYDROCHLORIDE 20 MG/ML
SWISH AND SPIT 15ML BY MOUTH THREE TIMES A DAY SOLUTION ORAL; TOPICAL
Qty: 800 MILLILITER | Refills: 2 | Status: ACTIVE | COMMUNITY

## 2022-07-20 RX ORDER — ALENDRONATE SODIUM 70 MG
TABLET ORAL
Qty: 0 | Refills: 0 | Status: ACTIVE | COMMUNITY
Start: 1900-01-01

## 2022-07-20 RX ORDER — PREDNISONE 10 MG/1
TAKE 40 MG DAILY TABLET ORAL ONCE A DAY
Status: ACTIVE | COMMUNITY

## 2022-07-20 RX ORDER — RABEPRAZOLE SODIUM 20 MG/1
TAKE 1 TABLET TABLET, DELAYED RELEASE ORAL TWICE DAILY
Qty: 180 TABLETS | Refills: 6 | Status: ACTIVE | COMMUNITY
Start: 2019-02-17

## 2022-07-20 RX ORDER — RABEPRAZOLE SODIUM 20 MG/1
TAKE 1 TABLET BY MOUTH TWICE DAILY TABLET, DELAYED RELEASE ORAL
Qty: 180 | Refills: 3 | Status: ACTIVE | COMMUNITY

## 2022-07-20 RX ORDER — DICYCLOMINE HYDROCHLORIDE 20 MG/1
TAKE 2 TABLETS TABLET ORAL
Qty: 240 TABLETS | Refills: 11 | Status: ACTIVE | COMMUNITY
Start: 2022-02-14 | End: 2023-02-12

## 2022-07-20 RX ORDER — VITAMIN A 2400 MCG
1 TABLET CAPSULE ORAL ONCE A DAY
Qty: 90 TABLET | Refills: 1 | Status: ACTIVE | COMMUNITY
Start: 2022-01-12

## 2022-07-20 RX ORDER — PROMETHAZINE HYDROCHLORIDE 25 MG/1
TAKE 1 TABLETS TABLET ORAL BID
Qty: 60 TABLETS | Refills: 5 | Status: ACTIVE | COMMUNITY
Start: 2021-02-12

## 2022-07-20 RX ORDER — VEDOLIZUMAB 300 MG/5ML
INFUSE 300 MG OVER 30 MINUTE(S) BY INTRAVENOUS ROUTE EVERY 4 WEEKS INJECTION, POWDER, LYOPHILIZED, FOR SOLUTION INTRAVENOUS
Qty: 1 | Refills: 11 | Status: ACTIVE | COMMUNITY
Start: 1900-01-01

## 2022-07-20 RX ORDER — LETROZOLE TABLETS 2.5 MG/1
TAKE 1 TABLET (2.5 MG) BY ORAL ROUTE ONCE DAILY TABLET, FILM COATED ORAL 1
Qty: 0 | Refills: 0 | Status: ACTIVE | COMMUNITY
Start: 1900-01-01

## 2022-07-20 RX ORDER — IBUPROFEN 200 MG/1
TAKE 1 CAPSULE (200 MG) BY ORAL ROUTE EVERY 6 HOURS AS NEEDED CAPSULE, LIQUID FILLED ORAL
Qty: 0 | Refills: 0 | Status: ACTIVE | COMMUNITY
Start: 1900-01-01

## 2022-07-20 RX ORDER — IBUPROFEN 100 MG/1
TAKE 2 TABLETS (200 MG) BY ORAL ROUTE EVERY 6 HOURS AS NEEDED WITH FOOD TABLET, COATED ORAL
Qty: 0 | Refills: 0 | Status: ACTIVE | COMMUNITY
Start: 1900-01-01

## 2022-07-20 RX ORDER — METHOTREXATE 25 MG/ML
INJECT 1 ML INTO THE MUSCLE EVERY 7 DAYS INJECTION, SOLUTION INTRA-ARTERIAL; INTRAMUSCULAR; INTRATHECAL; INTRAVENOUS
Qty: 4 VIALS | Refills: 6 | Status: ACTIVE | COMMUNITY

## 2022-07-25 LAB
A/G RATIO: 1.7
ALBUMIN: 4.2
ALKALINE PHOSPHATASE: 92
ALT (SGPT): 5
ANTI-VEDOLIZUMAB ANTIBODY: <25
AST (SGOT): 8
BASO (ABSOLUTE): 0.1
BASOS: 1
BILIRUBIN, TOTAL: 0.2
BUN/CREATININE RATIO: 18
BUN: 14
C DIFFICILE TOXIN GENE NAA: NEGATIVE
C DIFFICILE TOXINS A+B, EIA: NEGATIVE
C-REACTIVE PROTEIN, QUANT: 12
CALCIUM: 9.2
CALPROTECTIN, FECAL: 145
CARBON DIOXIDE, TOTAL: 21
CHLORIDE: 101
CREATININE: 0.78
EGFR: 89
EOS (ABSOLUTE): 0.1
EOS: 1
GLOBULIN, TOTAL: 2.5
GLUCOSE: 99
HEMATOCRIT: 29.5
HEMATOLOGY COMMENTS:: (no result)
HEMOGLOBIN: 10.4
IMMATURE CELLS: (no result)
IMMATURE GRANS (ABS): 0
IMMATURE GRANULOCYTES: 0
LACTOFERRIN, FECAL, QUANT.: 126.98
LYMPHS (ABSOLUTE): 1.3
LYMPHS: 24
MCH: 40.2
MCHC: 35.3
MCV: 114
MONOCYTES(ABSOLUTE): 0.5
MONOCYTES: 10
NEUTROPHILS (ABSOLUTE): 3.4
NEUTROPHILS: 64
NRBC: (no result)
PLATELETS: 188
POTASSIUM: 3.9
PROTEIN, TOTAL: 6.7
RBC: 2.59
RDW: 13.8
SEDIMENTATION RATE-WESTERGREN: 43
SODIUM: 136
VEDOLIZUMAB: 25
WBC: 5.3

## 2022-08-05 ENCOUNTER — OFFICE VISIT (OUTPATIENT)
Dept: URBAN - METROPOLITAN AREA CLINIC 98 | Facility: CLINIC | Age: 57
End: 2022-08-05
Payer: COMMERCIAL

## 2022-08-05 VITALS — HEIGHT: 69 IN | BODY MASS INDEX: 18.48 KG/M2 | WEIGHT: 124.8 LBS

## 2022-08-05 DIAGNOSIS — K50.80 CROHN'S DISEASE OF BOTH SMALL AND LARGE INTESTINE WITHOUT COMPLICATIONS: ICD-10-CM

## 2022-08-05 DIAGNOSIS — E83.42 HYPOMAGNESEMIA: ICD-10-CM

## 2022-08-05 DIAGNOSIS — R19.7 DIARRHEA: ICD-10-CM

## 2022-08-05 DIAGNOSIS — K80.20 CHOLELITHIASIS: ICD-10-CM

## 2022-08-05 PROCEDURE — 99215 OFFICE O/P EST HI 40 MIN: CPT | Performed by: INTERNAL MEDICINE

## 2022-08-05 RX ORDER — IBUPROFEN 200 MG/1
TAKE 1 CAPSULE (200 MG) BY ORAL ROUTE EVERY 6 HOURS AS NEEDED CAPSULE, LIQUID FILLED ORAL
Qty: 0 | Refills: 0 | Status: ACTIVE | COMMUNITY
Start: 1900-01-01

## 2022-08-05 RX ORDER — BUDESONIDE 9 MG/1
1 TABLET IN THE MORNING TABLET, FILM COATED, EXTENDED RELEASE ORAL ONCE A DAY
Qty: 30 TABLETS | Refills: 2 | Status: ACTIVE | COMMUNITY
Start: 2022-06-15 | End: 2022-09-13

## 2022-08-05 RX ORDER — LIDOCAINE HYDROCHLORIDE 20 MG/ML
SWISH AND SPIT 15ML BY MOUTH THREE TIMES A DAY SOLUTION ORAL; TOPICAL
Qty: 800 MILLILITER | Refills: 2 | Status: ACTIVE | COMMUNITY

## 2022-08-05 RX ORDER — VEDOLIZUMAB 300 MG/5ML
INFUSE 300 MG OVER 30 MINUTE(S) BY INTRAVENOUS ROUTE EVERY 4 WEEKS INJECTION, POWDER, LYOPHILIZED, FOR SOLUTION INTRAVENOUS
Qty: 1 | Refills: 11 | Status: ACTIVE | COMMUNITY
Start: 1900-01-01

## 2022-08-05 RX ORDER — IBUPROFEN 100 MG/1
TAKE 2 TABLETS (200 MG) BY ORAL ROUTE EVERY 6 HOURS AS NEEDED WITH FOOD TABLET, COATED ORAL
Qty: 0 | Refills: 0 | Status: ACTIVE | COMMUNITY
Start: 1900-01-01

## 2022-08-05 RX ORDER — ALENDRONATE SODIUM 70 MG
TABLET ORAL
Qty: 0 | Refills: 0 | Status: ACTIVE | COMMUNITY
Start: 1900-01-01

## 2022-08-05 RX ORDER — LETROZOLE TABLETS 2.5 MG/1
TAKE 1 TABLET (2.5 MG) BY ORAL ROUTE ONCE DAILY TABLET, FILM COATED ORAL 1
Qty: 0 | Refills: 0 | Status: ACTIVE | COMMUNITY
Start: 1900-01-01

## 2022-08-05 RX ORDER — RABEPRAZOLE SODIUM 20 MG/1
TAKE 1 TABLET TABLET, DELAYED RELEASE ORAL TWICE DAILY
Qty: 180 TABLETS | Refills: 6 | Status: ACTIVE | COMMUNITY
Start: 2019-02-17

## 2022-08-05 RX ORDER — PREDNISONE 10 MG/1
TAKE 40 MG DAILY TABLET ORAL ONCE A DAY
Status: ACTIVE | COMMUNITY

## 2022-08-05 RX ORDER — DICYCLOMINE HYDROCHLORIDE 20 MG/1
TAKE 2 TABLETS TABLET ORAL
Qty: 240 TABLETS | Refills: 11 | Status: ACTIVE | COMMUNITY
Start: 2022-02-14 | End: 2023-02-12

## 2022-08-05 RX ORDER — PROMETHAZINE HYDROCHLORIDE 25 MG/1
TAKE 1 TABLETS TABLET ORAL BID
Qty: 60 TABLETS | Refills: 5 | Status: ACTIVE | COMMUNITY
Start: 2021-02-12

## 2022-08-05 RX ORDER — DIPHENOXYLATE HCL/ATROPINE 2.5-.025MG
TAKE 2 TABLETS TABLET ORAL TWICE DAILY
Qty: 360 TABLETS | Refills: 1 | Status: ACTIVE | COMMUNITY

## 2022-08-05 RX ORDER — VITAMIN A 2400 MCG
1 TABLET CAPSULE ORAL ONCE A DAY
Qty: 90 TABLET | Refills: 1 | Status: ACTIVE | COMMUNITY
Start: 2022-01-12

## 2022-08-05 RX ORDER — METHOTREXATE 25 MG/ML
INJECT 1 ML INTO THE MUSCLE EVERY 7 DAYS INJECTION, SOLUTION INTRA-ARTERIAL; INTRAMUSCULAR; INTRATHECAL; INTRAVENOUS
Qty: 4 VIALS | Refills: 6 | Status: ACTIVE | COMMUNITY

## 2022-08-05 RX ORDER — RABEPRAZOLE SODIUM 20 MG/1
TAKE 1 TABLET BY MOUTH TWICE DAILY TABLET, DELAYED RELEASE ORAL
Qty: 180 | Refills: 3 | Status: ACTIVE | COMMUNITY

## 2022-08-05 NOTE — HPI-TODAY'S VISIT:
58 yo female comes in with  Guzman for 3 week follow up.  She feels her Crohn's is better. Ileostomy outputs similar. 5-7 per day.  No blood. Mouth is much better. Less pain. Rectum is in place. Mucus and stool per rectum. Blood per rectum She feels closing her up is not an answer.  Problems with back --- spinal stenosis - Guzman Barajas MD [facility]. Hip and Knee hurt. Has an appointment with [facility].  Not eating much. Lost 6 more lbs. Been sick for 1.5 weeks - does not know. Headache and neck ache, could not lift head. Has been exhausted and dizzy. Can't walk in a straight line.  Has primary MD.    Slade Auguste MD - [place]. Less steady when whe walks. Very weak. Difficulty getting here for the appointment.  Not eating because not hung  Meds: Entyvio 300 mg iv q 4w Gabapentin 300 mg po tid started 3 weeks ago Lomotil Paxil Dicyclomine Folic acid Ibuprofen Trazodone  For upcoming back fusion with Dr Jenn Adkinsgic to tape, levoquin, sulfa   ===================== 22  58 yo female with severe CD on Entyvio 300 mg iv q 4 w and mtx 12.5 mg sq q week Budesonide 3 mg a day and folic acid.  Main complaints are: Mouth ulcers Abdominal pain Diarrhea- empties bad 7 times Bad day more than 10/ No blood in stool. Nausea - continuous - awakens with nausea. Eats and nausea. PPI- Aciphex 20 mg bid. Trazodone. No other meds  Next infusion next week.   ======================= 22  55 yo female comes in for urgent f/u re; need for Promethazine 6.25 mg/5ml solution as this works better for her when she has acute nausea and nausea unrelieved by ondansetron. I feel this is an appropriate need and should be approved by insurance even though it is non-formulary.  MRE is pending and plan per radiology is to use barium as volumen as she vomits with it.  She experiences nausea every day due to bowel dysfunction from active Crohns disease and other factors.  Meds: Entyvio 300 mg iv q 4 w Methotrexate 1 cc per week.  Wants to reduce to .5 cc per week and that is fine. Folic acid 1 mg a da\7 Budesonide 3 mg now, reduced from 9 m,g =============================== 22  55 yo female returns for 5 week f/u. Stoma is fully healed. No issues with the appliances. Emptying bag 5-6 times a day and coping.  She has been having abdominal pain for the past few weeks. It felt like someone was "trying to pull the uterus out" and she calls it scar tissue. There was nothing on US, to cause the pain. MRE from 21 showed ??problable low grade partial SBO but this may be dysfunction.  She came to see me for the iron deficiency anemia. She has noticed some bleeding.  She still has urgency to move her bowels. Yesterday was scary becasue she saw blood in the rectal fluid. Passes rectal fluid 8-10 times a day. At night, it can be horrendous. Bottom of diaper is full.  On Entyvio 300 mg iv q 4 Methotrexate 1 cc q week Budesonide -- 3m 3 per day.  -Was feeling bad 6 months ago or so and Bud was started. May be worth trying to taper   ============================= 22  55 yo female with CD and ileostomy comes in for 3 month f/u visit.  Finally healing from stoma revision 10/15/21.  Stoma never stuck out enough and kept   Dr. Talamantes did a stoma revision.  Mild "complication" as skin around the stoma, stoma had a "moat" around it.  Empties bag 5-6 times. Interval check. Eating ok. Mouth is healing from the leucovorin to lessen the effect of mtx on mouth.  Getting guidance from Myerson.  ===================================== 10/6/21  55 yo female with Crohn's and avascular necrosis  and saw Dr. Myerson 2 days ago and he did blood work has some plans - f/u in 3 weeks.  Sees Dr. Gardiner tomorrow to address major ostomy issues.  Considering ostomy revision.  Retracted stoma.  Saw her mother and needed more supplies due to leaking.  Entocort now for 10 weeks, 6 mg a day, empties ostomy 6-9 times.  Has leaked but not in recent weeks.  Never on 9 mg and capri 6 mg for 10 weeks.  Try alternating 6 mg and 3 mg on alternate days.    ========================  21  55 yo female with CD now for 1 month f/u after telehealth and doing better 5 weeks into course of Entocort tx.  Does havd avascular necrosis of hips and understands there are risks of similar complications with Entocort therapy.  Was on fosamax. I just called Dr. Myerson to arrange an appoinment.  Ostomy - empties it 6 times so far today. Occasional abdominal pain.  On Entyvio every 4 weeks Entocort 9 mg a day x 5 weeks.  Asked to have CD4/CD8 checked, by sister's immunologist.   ===================== 8/3/21  55 yo female for ' f/u due to beeing weak and in bed. In ER last week and they sent her home.  Meds: Entyvio 300 mg q 4 weeks Entocort  x 1 week Off prednisone - had been on prednisone- she does not think it is what was helping her. On 1 cc mtx per week.  Devin Auguste is primary MD Saw him a month ago.  Main syx now.5 days ago was throwing up and was so distended. Could not poke her stomach. went to the ER Wednesday. N/V. Aciphex 1 twice daily. Last looked  Never had an ulcer. Ileostomy in place.  Fever on Friday, up to 101. Eating now. Hungry yesterday  Not vomiting.  May neede EGD.     ====================== 21  54 yo female comes in for f/u.  She is "off" per Dr. Jaquez.  She did an MRI. Dr. Jaquez reportedly thinks it is her meds. Has not seen a neurologist in years. Recent  labs were nl mostly with Hb 10.1, nl K-4.0 and Ca. Nl ESR and CRP, but had just finished medrol dose eliza.  Wants a Mg level. [facility] to be [facility] to [facility].  Nl quantiferon.  Currently , getting Entyvio every 4 weeks.  Getting entyvio every 4 weeks. Taking 2 weeks off mtx due to stomatitis. If resuming, would reduce from 1 cc to .5 cc..  She is not sure how much she needs mtx and how much it has helped in the past. Mtx has very likely caused the stomatis. Or the pred medrol could have helped the mouth and belly. She has avn all over.  Myerson will see her and order bone scan  Addendum ------21 Has thicker stools Has to change her bag because of high outputs She says her stoma is an outie  She is on pred 20 mg and should taper off it by going to 10 mg to 5 mg for a week and then d/c Risks of prednisone discussed.  Had dropped mtx from 25 to 12.5 and will go back up to 25 mg a week. Consider [facility] referral if not improvimg at that point. ============ 21  54 yo female with CD and ostomy for 5 month f/u.  Abd is good. Ostomy is functioning. Sleep thru much of night.  Appetite and intake is better. Oral CD problematic. Has been in touch with Dr. Pepe ---- swish and spit.  Max-- 150. Current weight is 121.  Not taking supplements --  Boost and Ensure. 2-3 cans The y don't agree with her.. R Lactose free ice  She wants low dose prednisone. Lets try 2.5-5 mg a day.  ================== 20  54 yo female for 3 week TH f/u. Has upper abdominal pain that may be due to gallstones. Had referred her to Dr. Moreland but she did not call. She heard he was retiring and not. Has abdominal pain when she. Left upper quadrant or "upper rib cage" mostly left. He knows Dr. Talamantes. -  Frustrated---- blisters on lip. Torn open. Has not tried steroid rinse or paste in past. Magic  mouth wash helps. Does not reduce the blisters. Dr. Best- non steroid options for mouth   ======================== 10/23/20  10/23 54 yo female for 3 week TH f/u. Has oral Crohn's and I-C and stoma issues. Entyvio may not cover the EIM. Might try oral steroid lozenge --- pred paste and lozenges.  Entyvio 300 q 4 weeks Mtx 1 cc q week Pred 5 mg may stop. Bentyl -  Abd pain is ok No idea what triggered her admission Food induced -   Consider medrol dose eliza in the future  Extensive lab review CBC --- Hct 31.6/Hb 10.9 plt 120 CRP 22 ESR 56 Iron 33% Ferrintin good ================================================== 10/2/20  54 yo female with  Crohn's disease recently hosp 3 days at  for abd pain, N/V and got better with 3 dasy 3 days of iv steroids.  Surgery considered.  Was on 40 mg pred - intolerant anxiety  Now dropped to 30 mg a day. Now is 50-60% better than admission.  Surgery debated gallstones. They leaned toward blockage.  Has been on Stelara. Entyvio works better than Stelara. Mtx makes her tired She would like to up the dose of lomotil.  20  55 yo female with Crohnn's disease having continued active syx. Getting Entyvio every 4 weeks. Methotrexate 12.5 mg per week Dicyclomine tablets 2 po qid  == She has taken Xifaxan and it tears her up.   Then stop it. Does not like flagyl and does not help.  Pepto 1-2 doses a day, does help the diarreha. She uses Immodium every day.  Stooling ----- empties bag 6-10 times a day. Abdominal pain - when she eats and intermittent. Energy - gone.  Labs- Anemia - Hb lower CRP is higher ---34 (was 16).   ========================== 20  Crohn's disease.     55 yo female with Crohn's disease comes in for f/u.  Dr. Jaquez is considering a GYN etiology.  Needs a pouch exam.  Getting Entyvio every 4 weeks.  Stelara did not work as well as Entyvio.  Normally when she gets an infusion, she gets a bounce and has not.  Prometheus Vedo level 3/25, just before 4 week dose was great_ __ _32.5 and no antibodies.  Feb 10 labs looked good CRP 29.9 and cbc looked fine.  ESR elevation.  2 liters of miralax_ __ _ Try 6-8am _ __ _- and then an exam at 2-3 pm She asked about a pilonidal cyst and I directed to Dr. Talamantes  Xifaxan  Try pepto bismol 1-2 prn on days.    ===== 20  55 yo female comes in at my request for 3 week f/u of Crohn's due to new 4 x eleation of CRP at 29.9.  19 CRP was similarly elevated 4x at 2.1/.5  Leonid is checking CT abd-pelvis due to pelvic pain which is not characteristic of her Crohn's.  She is wondering about GYN.  Or spastic bladder.  Dr. Talamantes thought stable but wants to scope to be sure.  Empties bag 10x a day.That is more than 3-6 months ago and has leakage of the ostomy bag. Maybe more volume.  Very watery.  Review of labs from 2/10 show cRP 29.i9 but others are stable. CBC looks good with trace elevation of wbc and borderline anemia. Getting iv iron from Arianna Jaquez. Vit D is normal at 39. Albumin is normal at 4.5.  MRI 10/1/19 showed gallstones , present in past, and she has mild pain that is intermittent Liver tests are nl. Can assess with HIDA, and/or with surgical consult, e.g ???relative need for choly, high vs. low.    ================ 20  55 yo female with severe Crohn's disease and h/o breast cancer _ _ Arianna Jaquez MD on Letrasol. 10th year.  Abdominal pain is confusing.  Has gotten pain in the ovary and in the uterus. GYN-Ann Laird MD and did US was fine. Sent to PT for pelvic treatment.  At her comment, I reviewed [facility] imaging 16 nl CTE 5/3/17 nl [facility] US Oct 2 2019 "Cholelithiasis"   o/w MRE was nl - no inflammation. BUT THE MRI DID SHOW "PERSISTENT TETHERING OF UTERUS TO RECTAL STUMP"  On Entyvio 300 mg q 4 weeks.  Next dose in 2 weeks On mtx _ _.5 cc. Monthly b12 shots  Eating ok, some weight loss. Weight stable x 6 months and down 30 lb since _ __ _- she is comfortable with current weight.  ============= 19  55 yo female with severe Crohn's and h/o breast cancer comes in for 3 month f/u.  Doing poorly.  Daily abd pain which is intermittent.  When she eats she gets pain and nausea.  Taking zofran and phenergan.  Sleep pattern is awry.  Empties bag 5 times a day and going out of rectum.  Has a loop ileostomy that may be problematic.  Allergic to tape.  Currently on Entyvio 300 mg q 8 weeks.  Stelara did not work as well.  Discussed the entyvio at shorter cycles of q 4 weeks to see if that will control syx better.  Outputs are higher and she may be volume depleted and maybe more entyvio will help.  last entyvio was last week and so a level won't be useful today.  Will just proceed with dose increase.    ============== 18 51 yo female with Crohn's disease and breast cancer and diverting ileostomy from Dr. Talamantes.  Skin issue - addressed by Dr. Best.  See Brooke Talamantes MD and enterostomal therapist.  AGWS.  No fcs.      Review of Systems  ConstitutionalDenies : body aches, fever, weight gain, weight loss  CardiovascularDenies : chest pain, heart racing/skipping, high blood pressure  RespiratoryDenies : chronic cough, shortness of breath, wheezing or asthma symptoms  GastrointestinalDenies : Abdominal Pain/discomfort, Anal/Rectal Pain or Itching, Anal Spasm, Black Stool, Bloating/belching/gaseouness, Change of Bowel Habit, Constipation, Diarrhea/Loose Stool, Difficulty in Swallowing, Heartburn/esophageal reflux, Hemorrhoids, Indigestion, Mucus in Stool, Nausea/vomiting, Rectal Bleeding, Unintentional Weight Loss    Vitals  Date Time BP Position Site L\R Cuff Size HR RR TEMP (F) WT  HT  BMI kg/m2 BSA m2 O2 Sat HC     2020 05:15 PM         133lbs 0oz 5'  8.5" 19.93 1.71          Assessment  Crohn's Disease     555.2  Diarrhea     787.91/R19.7  Cholelithiasis     574.20/K80.20  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 () - - 2020  EGD w/Biopsy if indicated (59092) - - 2020  Pouchoscopy (45492) - - 2020  CMP (comprehensive metabolic panel) (02773) - - 2020  Sed rate (52008) - - 2020  C-reactive protein (31235) - - 2020  Ferritin assay (69438) - - 2020  Iron + iron binding capacity panel ser/plas (14745, 06955) - - 2020  Vitamin B12 and folate measurement (53593, 07724) - - 2020  CBC with diff (40047) - - 2020  25-OH-Vitamin D (71302) - - 2020  GPP 22 (FilmArray) - Gastrointestinal Pathogen Panel - QDx (59732) - - 2020  MedicationsMedications have been Reconciled  Transition of Care or Provider Policy  InstructionsPatient seen today via telehealth by agreement and consent of patient in light of current COVID-19 pandemic. I used video conferencing during the visit. The patient encounter is appropriate and reasonable under the circumstances given the patient's particular presentation at this time. The patient has been advised of the followin) the potential risks and limitations of this mode of treatment (including but not limited to the absence of in-person examination); 2) the right to refuse telehealth services at any point without affecting the right to future care; 3) the right to receive in-person services, included immediately after this consultation if an urgent need arises; 4) information, including identifiable images or information from this telehealth consult, will only be shared in accordance with HIPPA regulations. Any and all of the patient's and/or patient's family member's questions on this issue have been answered. The patient has verbally consented to be treated via telehealth services. The patient has also been advised to contact this office for worsening conditions or problems, and seek emergency medical treatment and/or call 911 if the patient deems either necessary.  (For commercial payers, telehealth video only) More than half of the face-to-face time used for counseling and coordination of care.  40 min appointment (17298 EP)  I have documented a list of current medications and reviewed it with the patient.  I encouraged the patient to diet and exercise.  DispositionReturn To Clinic in 2 Months  CorrespondenceCC this document (Slade Auguste MD, Brooke Talamantes MD, Arianna Jaquez MD) - 2020    38702 exam med 32 min time  needs refill or lomotil Can't eRx lomotil.  Aciphex refill.  Needs EGD and pouchoscopy         Electronically Signed by: MD MIGUEL A Carpio

## 2022-08-06 LAB
A/G RATIO: 1.7
ALBUMIN: 4.3
ALKALINE PHOSPHATASE: 112
ALT (SGPT): 14
AST (SGOT): 10
BASO (ABSOLUTE): 0.1
BASOS: 1
BILIRUBIN, TOTAL: <0.2
BUN/CREATININE RATIO: 17
BUN: 21
C-REACTIVE PROTEIN, QUANT: 46
CALCIUM: 9
CARBON DIOXIDE, TOTAL: 16
CHLORIDE: 97
CREATININE: 1.21
EGFR: 53
EOS (ABSOLUTE): 0.2
EOS: 3
FERRITIN, SERUM: 1792
FOLATE (FOLIC ACID), SERUM: >20
GLOBULIN, TOTAL: 2.5
GLUCOSE: 88
HEMATOCRIT: 31.8
HEMATOLOGY COMMENTS:: (no result)
HEMOGLOBIN: 11
IMMATURE CELLS: (no result)
IMMATURE GRANS (ABS): 0.1
IMMATURE GRANULOCYTES: 1
IRON BIND.CAP.(TIBC): 196
IRON SATURATION: 33
IRON: 65
LYMPHS (ABSOLUTE): 1.7
LYMPHS: 22
MAGNESIUM: 0.9
MCH: 40
MCHC: 34.6
MCV: 116
MONOCYTES(ABSOLUTE): 0.8
MONOCYTES: 11
NEUTROPHILS (ABSOLUTE): 4.7
NEUTROPHILS: 62
NRBC: (no result)
PLATELETS: 190
POTASSIUM: 3.9
PROTEIN, TOTAL: 6.8
RBC: 2.75
RDW: 13.9
SEDIMENTATION RATE-WESTERGREN: 59
SODIUM: 133
UIBC: 131
VITAMIN B12: >2000
WBC: 7.5

## 2022-08-18 ENCOUNTER — OFFICE VISIT (OUTPATIENT)
Dept: URBAN - METROPOLITAN AREA CLINIC 97 | Facility: CLINIC | Age: 57
End: 2022-08-18

## 2022-08-22 ENCOUNTER — ERX REFILL RESPONSE (OUTPATIENT)
Dept: URBAN - METROPOLITAN AREA CLINIC 98 | Facility: CLINIC | Age: 57
End: 2022-08-22

## 2022-08-22 RX ORDER — PROMETHAZINE HYDROCHLORIDE 6.25 MG/5ML
TAKE 5 ML BY MOUTH AT BEDTIME SOLUTION ORAL
Qty: 150 MILLILITER | Refills: 2 | OUTPATIENT

## 2022-08-22 RX ORDER — PROMETHAZINE HYDROCHLORIDE 6.25 MG/5ML
TAKE 5 ML BY MOUTH AT BEDTIME SOLUTION ORAL
Qty: 150 MILLILITER | Refills: 1 | OUTPATIENT

## 2022-09-23 ENCOUNTER — TELEPHONE ENCOUNTER (OUTPATIENT)
Dept: URBAN - METROPOLITAN AREA CLINIC 98 | Facility: CLINIC | Age: 57
End: 2022-09-23

## 2022-09-23 RX ORDER — PROMETHAZINE HYDROCHLORIDE 6.25 MG/5ML
TAKE 5 ML BY MOUTH AT BEDTIME SOLUTION ORAL
Qty: 150 ML | Refills: 11

## 2022-09-23 RX ORDER — BUDESONIDE 9 MG/1
1 TABLET IN THE MORNING TABLET, FILM COATED, EXTENDED RELEASE ORAL ONCE A DAY
Qty: 30 TABLETS | Refills: 11
Start: 2022-06-15 | End: 2023-09-18

## 2022-09-29 ENCOUNTER — TELEPHONE ENCOUNTER (OUTPATIENT)
Dept: URBAN - METROPOLITAN AREA CLINIC 98 | Facility: CLINIC | Age: 57
End: 2022-09-29

## 2022-09-29 RX ORDER — ONDANSETRON HYDROCHLORIDE 8 MG/1
1 CAPSULE TABLET, FILM COATED ORAL TWICE DAILY
Qty: 180 CAPSULES | Refills: 3 | OUTPATIENT
Start: 2022-09-29 | End: 2023-09-24

## 2022-10-03 ENCOUNTER — TELEPHONE ENCOUNTER (OUTPATIENT)
Dept: URBAN - METROPOLITAN AREA CLINIC 98 | Facility: CLINIC | Age: 57
End: 2022-10-03

## 2022-10-03 RX ORDER — FOLIC ACID 5 MG
1 CAPSULE CAPSULE ORAL ONCE A DAY
Qty: 90 CAPSULE | Refills: 3 | OUTPATIENT
Start: 2022-10-03 | End: 2023-09-28

## 2022-10-11 ENCOUNTER — TELEPHONE ENCOUNTER (OUTPATIENT)
Dept: URBAN - METROPOLITAN AREA CLINIC 98 | Facility: CLINIC | Age: 57
End: 2022-10-11

## 2022-10-11 RX ORDER — DIPHENOXYLATE HCL/ATROPINE 2.5-.025MG
TAKE 2 TABLETS TABLET ORAL TWICE DAILY
Qty: 360 TABLETS | Refills: 1
End: 2023-04-09

## 2022-10-19 ENCOUNTER — TELEPHONE ENCOUNTER (OUTPATIENT)
Dept: URBAN - METROPOLITAN AREA CLINIC 92 | Facility: CLINIC | Age: 57
End: 2022-10-19

## 2022-10-19 ENCOUNTER — CLAIMS CREATED FROM THE CLAIM WINDOW (OUTPATIENT)
Dept: URBAN - METROPOLITAN AREA CLINIC 98 | Facility: CLINIC | Age: 57
End: 2022-10-19
Payer: COMMERCIAL

## 2022-10-19 ENCOUNTER — TELEPHONE ENCOUNTER (OUTPATIENT)
Dept: URBAN - METROPOLITAN AREA CLINIC 98 | Facility: CLINIC | Age: 57
End: 2022-10-19

## 2022-10-19 VITALS — HEIGHT: 69 IN | WEIGHT: 126 LBS | BODY MASS INDEX: 18.66 KG/M2

## 2022-10-19 DIAGNOSIS — Z79.899 IMMUNOSUPPRESSION DUE TO DRUG THERAPY: ICD-10-CM

## 2022-10-19 DIAGNOSIS — R10.84 ABDOMINAL PAIN, GENERALIZED: ICD-10-CM

## 2022-10-19 DIAGNOSIS — K80.20 CHOLELITHIASIS: ICD-10-CM

## 2022-10-19 DIAGNOSIS — K50.80 CROHN'S DISEASE OF BOTH SMALL AND LARGE INTESTINE: ICD-10-CM

## 2022-10-19 DIAGNOSIS — K50.80 CROHN'S DISEASE OF BOTH SMALL AND LARGE INTESTINE WITHOUT COMPLICATIONS: ICD-10-CM

## 2022-10-19 DIAGNOSIS — R19.7 DIARRHEA: ICD-10-CM

## 2022-10-19 DIAGNOSIS — K50.90 CROHN DISEASE: ICD-10-CM

## 2022-10-19 DIAGNOSIS — K50.80 CROHN'S DISEASE: ICD-10-CM

## 2022-10-19 DIAGNOSIS — E83.42 HYPOMAGNESEMIA: ICD-10-CM

## 2022-10-19 PROCEDURE — 99214 OFFICE O/P EST MOD 30 MIN: CPT | Performed by: INTERNAL MEDICINE

## 2022-10-19 RX ORDER — RABEPRAZOLE SODIUM 20 MG/1
TAKE 1 TABLET TABLET, DELAYED RELEASE ORAL TWICE DAILY
Qty: 180 TABLETS | Refills: 6 | Status: ACTIVE | COMMUNITY
Start: 2019-02-17

## 2022-10-19 RX ORDER — RABEPRAZOLE SODIUM 20 MG/1
TAKE 1 TABLET BY MOUTH TWICE DAILY TABLET, DELAYED RELEASE ORAL
Qty: 180 | Refills: 3 | Status: ACTIVE | COMMUNITY

## 2022-10-19 RX ORDER — DICYCLOMINE HYDROCHLORIDE 20 MG/1
TAKE 2 TABLETS TABLET ORAL
Qty: 240 TABLETS | Refills: 11 | Status: ACTIVE | COMMUNITY
Start: 2022-02-14 | End: 2023-02-12

## 2022-10-19 RX ORDER — METHOTREXATE 25 MG/ML
INJECT 1 ML INTO THE MUSCLE EVERY 7 DAYS INJECTION, SOLUTION INTRA-ARTERIAL; INTRAMUSCULAR; INTRATHECAL; INTRAVENOUS
Qty: 4 VIALS | Refills: 6 | Status: ACTIVE | COMMUNITY

## 2022-10-19 RX ORDER — VEDOLIZUMAB 300 MG/5ML
INFUSE 300 MG OVER 30 MINUTE(S) BY INTRAVENOUS ROUTE EVERY 4 WEEKS INJECTION, POWDER, LYOPHILIZED, FOR SOLUTION INTRAVENOUS
Qty: 1 | Refills: 11 | Status: ACTIVE | COMMUNITY
Start: 1900-01-01

## 2022-10-19 RX ORDER — IBUPROFEN 200 MG/1
TAKE 1 CAPSULE (200 MG) BY ORAL ROUTE EVERY 6 HOURS AS NEEDED CAPSULE, LIQUID FILLED ORAL
Qty: 0 | Refills: 0 | Status: ACTIVE | COMMUNITY
Start: 1900-01-01

## 2022-10-19 RX ORDER — PREDNISONE 10 MG/1
TAKE 40 MG DAILY TABLET ORAL ONCE A DAY
Status: ACTIVE | COMMUNITY

## 2022-10-19 RX ORDER — VITAMIN A 2400 MCG
1 TABLET CAPSULE ORAL ONCE A DAY
Qty: 90 TABLET | Refills: 1 | Status: ACTIVE | COMMUNITY
Start: 2022-01-12

## 2022-10-19 RX ORDER — ONDANSETRON HYDROCHLORIDE 8 MG/1
1 CAPSULE TABLET, FILM COATED ORAL TWICE DAILY
Qty: 180 CAPSULES | Refills: 3 | Status: ACTIVE | COMMUNITY
Start: 2022-09-29 | End: 2023-09-24

## 2022-10-19 RX ORDER — RISANKIZUMAB-RZAA 360 MG/2.4
360 MG WEARABLE INJECTOR SUBCUTANEOUS
Qty: 1 KIT | Refills: 8
Start: 2022-10-19 | End: 2024-03-06

## 2022-10-19 RX ORDER — DIPHENOXYLATE HCL/ATROPINE 2.5-.025MG
TAKE 2 TABLETS TABLET ORAL TWICE DAILY
Qty: 360 TABLETS | Refills: 1 | Status: ACTIVE | COMMUNITY
End: 2023-04-09

## 2022-10-19 RX ORDER — LIDOCAINE HYDROCHLORIDE 20 MG/ML
SWISH AND SPIT 15ML BY MOUTH THREE TIMES A DAY SOLUTION ORAL; TOPICAL
Qty: 800 MILLILITER | Refills: 2 | Status: ACTIVE | COMMUNITY

## 2022-10-19 RX ORDER — LETROZOLE TABLETS 2.5 MG/1
TAKE 1 TABLET (2.5 MG) BY ORAL ROUTE ONCE DAILY TABLET, FILM COATED ORAL 1
Qty: 0 | Refills: 0 | Status: ACTIVE | COMMUNITY
Start: 1900-01-01

## 2022-10-19 RX ORDER — PROMETHAZINE HYDROCHLORIDE 6.25 MG/5ML
TAKE 5 ML BY MOUTH AT BEDTIME SOLUTION ORAL
Qty: 150 ML | Refills: 11 | Status: ACTIVE | COMMUNITY

## 2022-10-19 RX ORDER — PROMETHAZINE HYDROCHLORIDE 25 MG/1
TAKE 1 TABLETS TABLET ORAL BID
Qty: 60 TABLETS | Refills: 5 | Status: ACTIVE | COMMUNITY
Start: 2021-02-12

## 2022-10-19 RX ORDER — FOLIC ACID 5 MG
1 CAPSULE CAPSULE ORAL ONCE A DAY
Qty: 90 CAPSULE | Refills: 3 | Status: ACTIVE | COMMUNITY
Start: 2022-10-03 | End: 2023-09-28

## 2022-10-19 RX ORDER — BUDESONIDE 9 MG/1
1 TABLET IN THE MORNING TABLET, FILM COATED, EXTENDED RELEASE ORAL ONCE A DAY
Qty: 30 TABLETS | Refills: 11 | Status: ACTIVE | COMMUNITY
Start: 2022-06-15 | End: 2023-09-18

## 2022-10-19 RX ORDER — IBUPROFEN 100 MG/1
TAKE 2 TABLETS (200 MG) BY ORAL ROUTE EVERY 6 HOURS AS NEEDED WITH FOOD TABLET, COATED ORAL
Qty: 0 | Refills: 0 | Status: ACTIVE | COMMUNITY
Start: 1900-01-01

## 2022-10-19 RX ORDER — RISANKIZUMAB-RZAA 360 MG/2.4
360 MG WEARABLE INJECTOR SUBCUTANEOUS
Qty: 1 KIT | Refills: 8 | OUTPATIENT
Start: 2022-10-19 | End: 2024-03-05

## 2022-10-19 RX ORDER — ALENDRONATE SODIUM 70 MG
TABLET ORAL
Qty: 0 | Refills: 0 | Status: ACTIVE | COMMUNITY
Start: 1900-01-01

## 2022-10-19 NOTE — HPI-TODAY'S VISIT:
56 yo female for 2 month f/u. TH from home  Decision made to switch to Skyrizi.  Started Skyrizi in [place] about 6 weeks ago. Next appt is  - iv Dose 3 --- Likellt start 9;/15-Dose 1 Delay in getting Entyvio change to Skyrizi. She is not sure if the Skyrizi is working.   It was 4 weeks--- not excessive    between last Entyvio and first Skyrizi.  Stooling is the same as 4 weeks ago. More abd pain Has heaviness and upper abdominal pain.   ====================================================== 22  56 yo female comes in with  Guzman for 3 week follow up.  She feels her Crohn's is better. Ileostomy outputs similar. 5-7 per day.  No blood. Mouth is much better. Less pain. Rectum is in place. Mucus and stool per rectum. Blood per rectum She feels closing her up is not an answer.  Problems with back --- spinal stenosis - Guzman Barajas MD [facility]. Hip and Knee hurt. Has an appointment with [facility].  Not eating much. Lost 6 more lbs. Been sick for 1.5 weeks - does not know. Headache and neck ache, could not lift head. Has been exhausted and dizzy. Can't walk in a straight line.  Has primary MD.    Slade Auguste MD - [place]. Less steady when whe walks. Very weak. Difficulty getting here for the appointment.  Not eating because not hung  Meds: Entyvio 300 mg iv q 4w Gabapentin 300 mg po tid started 3 weeks ago Lomotil Paxil Dicyclomine Folic acid Ibuprofen Trazodone  For upcoming back fusion with Dr Jenn Adkinsgic to tape, levoquin, sulfa   ===================== 22  56 yo female with severe CD on Entyvio 300 mg iv q 4 w and mtx 12.5 mg sq q week Budesonide 3 mg a day and folic acid.  Main complaints are: Mouth ulcers Abdominal pain Diarrhea- empties bad 7 times Bad day more than 10/ No blood in stool. Nausea - continuous - awakens with nausea. Eats and nausea. PPI- Aciphex 20 mg bid. Trazodone. No other meds  Next infusion next week.   ======================= 22  55 yo female comes in for urgent f/u re; need for Promethazine 6.25 mg/5ml solution as this works better for her when she has acute nausea and nausea unrelieved by ondansetron. I feel this is an appropriate need and should be approved by insurance even though it is non-formulary.  MRE is pending and plan per radiology is to use barium as volumen as she vomits with it.  She experiences nausea every day due to bowel dysfunction from active Crohns disease and other factors.  Meds: Entyvio 300 mg iv q 4 w Methotrexate 1 cc per week.  Wants to reduce to .5 cc per week and that is fine. Folic acid 1 mg a da\7 Budesonide 3 mg now, reduced from 9 m,g =============================== 22  55 yo female returns for 5 week f/u. Stoma is fully healed. No issues with the appliances. Emptying bag 5-6 times a day and coping.  She has been having abdominal pain for the past few weeks. It felt like someone was "trying to pull the uterus out" and she calls it scar tissue. There was nothing on US, to cause the pain. MRE from 21 showed ??problable low grade partial SBO but this may be dysfunction.  She came to see me for the iron deficiency anemia. She has noticed some bleeding.  She still has urgency to move her bowels. Yesterday was scary becasue she saw blood in the rectal fluid. Passes rectal fluid 8-10 times a day. At night, it can be horrendous. Bottom of diaper is full.  On Entyvio 300 mg iv q 4 Methotrexate 1 cc q week Budesonide -- 3m 3 per day.  -Was feeling bad 6 months ago or so and Bud was started. May be worth trying to taper   ============================= 22  55 yo female with CD and ileostomy comes in for 3 month f/u visit.  Finally healing from stoma revision 10/15/21.  Stoma never stuck out enough and kept   Dr. Talamantes did a stoma revision.  Mild "complication" as skin around the stoma, stoma had a "moat" around it.  Empties bag 5-6 times. Interval check. Eating ok. Mouth is healing from the leucovorin to lessen the effect of mtx on mouth.  Getting guidance from Myerson.  ===================================== 10/6/21  55 yo female with Crohn's and avascular necrosis  and saw Dr. Myerson 2 days ago and he did blood work has some plans - f/u in 3 weeks.  Sees Dr. Gardiner tomorrow to address major ostomy issues.  Considering ostomy revision.  Retracted stoma.  Saw her mother and needed more supplies due to leaking.  Entocort now for 10 weeks, 6 mg a day, empties ostomy 6-9 times.  Has leaked but not in recent weeks.  Never on 9 mg and capri 6 mg for 10 weeks.  Try alternating 6 mg and 3 mg on alternate days.    ========================  21  55 yo female with CD now for 1 month f/u after telehealth and doing better 5 weeks into course of Entocort tx.  Does havd avascular necrosis of hips and understands there are risks of similar complications with Entocort therapy.  Was on fosamax. I just called Dr. Myerson to arrange an appoinment.  Ostomy - empties it 6 times so far today. Occasional abdominal pain.  On Entyvio every 4 weeks Entocort 9 mg a day x 5 weeks.  Asked to have CD4/CD8 checked, by sister's immunologist.   ===================== 8/3/21  55 yo female for 'TH f/u due to beeing weak and in bed. In ER last week and they sent her home.  Meds: Entyvio 300 mg q 4 weeks Entocort  x 1 week Off prednisone - had been on prednisone- she does not think it is what was helping her. On 1 cc mtx per week.  Devin Auguste is primary MD Saw him a month ago.  Main syx now.5 days ago was throwing up and was so distended. Could not poke her stomach. went to the ER Wednesday. N/V. Aciphex 1 twice daily. Last looked  Never had an ulcer. Ileostomy in place.  Fever on Friday, up to 101. Eating now. Hungry yesterday  Not vomiting.  May neede EGD.     ====================== 21  56 yo female comes in for f/u.  She is "off" per Dr. Jaquez.  She did an MRI. Dr. Jaquez reportedly thinks it is her meds. Has not seen a neurologist in years. Recent  labs were nl mostly with Hb 10.1, nl K-4.0 and Ca. Nl ESR and CRP, but had just finished medrol dose eliza.  Wants a Mg level. Labs to be [facility].  Nl quantiferon.  Currently , getting Entyvio every 4 weeks.  Getting entyvio every 4 weeks. Taking 2 weeks off mtx due to stomatitis. If resuming, would reduce from 1 cc to .5 cc..  She is not sure how much she needs mtx and how much it has helped in the past. Mtx has very likely caused the stomatis. Or the pred medrol could have helped the mouth and belly. She has avn all over.  Myerson will see her and order bone scan  Addendum ------21 Has thicker stools Has to change her bag because of high outputs She says her stoma is an outie  She is on pred 20 mg and should taper off it by going to 10 mg to 5 mg for a week and then d/c Risks of prednisone discussed.  Had dropped mtx from 25 to 12.5 and will go back up to 25 mg a week. Consider [facility] referral if not improvimg at that point. ============ 21  56 yo female with CD and ostomy for 5 month f/u.  Abd is good. Ostomy is functioning. Sleep thru much of night.  Appetite and intake is better. Oral CD problematic. Has been in touch with Dr. Pepe ---- swish and spit.  Max-- 150. Current weight is 121.  Not taking supplements --  Boost and Ensure. 2-3 cans The y don't agree with her.. R Lactose free ice  She wants low dose prednisone. Lets try 2.5-5 mg a day.  ================== 20  56 yo female for 3 week TH f/u. Has upper abdominal pain that may be due to gallstones. Had referred her to Dr. Moreland but she did not call. She heard he was retiring and not. Has abdominal pain when she. Left upper quadrant or "upper rib cage" mostly left. He knows Dr. Talamantes. -  Frustrated---- blisters on lip. Torn open. Has not tried steroid rinse or paste in past. Magic  mouth wash helps. Does not reduce the blisters. Dr. Best- non steroid options for mouth   ======================== 10/23/20  10/23 56 yo female for 3 week  f/u. Has oral Crohn's and I-C and stoma issues. Entyvio may not cover the EIM. Might try oral steroid lozenge --- pred paste and lozenges.  Entyvio 300 q 4 weeks Mtx 1 cc q week Pred 5 mg may stop. Bentyl -  Abd pain is ok No idea what triggered her admission Food induced -   Consider medrol dose eliza in the future  Extensive lab review CBC --- Hct 31.6/Hb 10.9 plt 120 CRP 22 ESR 56 Iron 33% Ferrintin good ================================================== 10/2/20  56 yo female with  Crohn's disease recently hosp 3 days at  for abd pain, N/V and got better with 3 dasy 3 days of iv steroids.  Surgery considered.  Was on 40 mg pred - intolerant anxiety  Now dropped to 30 mg a day. Now is 50-60% better than admission.  Surgery debated gallstones. They leaned toward blockage.  Has been on Stelara. Entyvio works better than Stelara. Mtx makes her tired She would like to up the dose of lomotil.  20  53 yo female with Crohnn's disease having continued active syx. Getting Entyvio every 4 weeks. Methotrexate 12.5 mg per week Dicyclomine tablets 2 po qid  == She has taken Xifaxan and it tears her up.   Then stop it. Does not like flagyl and does not help.  Pepto 1-2 doses a day, does help the diarreha. She uses Immodium every day.  Stooling ----- empties bag 6-10 times a day. Abdominal pain - when she eats and intermittent. Energy - gone.  Labs- Anemia - Hb lower CRP is higher ---34 (was 16).   ========================== 20  Crohn's disease.     53 yo female with Crohn's disease comes in for f/u.  Dr. Jaquez is considering a GYN etiology.  Needs a pouch exam.  Getting Entyvio every 4 weeks.  Stelara did not work as well as Entyvio.  Normally when she gets an infusion, she gets a bounce and has not.  Prometheus Vedo level 3/25, just before 4 week dose was great_ __ _32.5 and no antibodies.  Feb 10 labs looked good CRP 29.9 and cbc looked fine.  ESR elevation.  2 liters of miralax_ __ _ Try 6-8am _ __ _- and then an exam at 2-3 pm She asked about a pilonidal cyst and I directed to Dr. Albin Owen  Try pepto bismol 1-2 prn on days.    ===== 20  53 yo female comes in at my request for 3 week f/u of Crohn's due to new 4 x eleation of CRP at 29.9.  19 CRP was similarly elevated 4x at 2.1/.5  Leonid is checking CT abd-pelvis due to pelvic pain which is not characteristic of her Crohn's.  She is wondering about GYN.  Or spastic bladder.  Dr. Talamantes thought stable but wants to scope to be sure.  Empties bag 10x a day.That is more than 3-6 months ago and has leakage of the ostomy bag. Maybe more volume.  Very watery.  Review of labs from 2/10 show cRP 29.i9 but others are stable. CBC looks good with trace elevation of wbc and borderline anemia. Getting iv iron from Arianna Jaquez. Vit D is normal at 39. Albumin is normal at 4.5.  MRI 10/1/19 showed gallstones , present in past, and she has mild pain that is intermittent Liver tests are nl. Can assess with HIDA, and/or with surgical consult, e.g ???relative need for choly, high vs. low.    ================ 20  53 yo female with severe Crohn's disease and h/o breast cancer _ _ Arianna Jaquez MD on Letrasol. 10th year.  Abdominal pain is confusing.  Has gotten pain in the ovary and in the uterus. GYN-Ann Laird MD and did US was fine. Sent to PT for pelvic treatment.  At her comment, I reviewed [facility] imaging 16 nl CTE 5/3/17 nl GB US Oct 2 2019 "Cholelithiasis"   o/w MRE was nl - no inflammation. BUT THE MRI DID SHOW "PERSISTENT TETHERING OF UTERUS TO RECTAL STUMP"  On Entyvio 300 mg q 4 weeks.  Next dose in 2 weeks On mtx _ _.5 cc. Monthly b12 shots  Eating ok, some weight loss. Weight stable x 6 months and down 30 lb since _ __ _- she is comfortable with current weight.  ============= 19  53 yo female with severe Crohn's and h/o breast cancer comes in for 3 month f/u.  Doing poorly.  Daily abd pain which is intermittent.  When she eats she gets pain and nausea.  Taking zofran and phenergan.  Sleep pattern is awry.  Empties bag 5 times a day and going out of rectum.  Has a loop ileostomy that may be problematic.  Allergic to tape.  Currently on Entyvio 300 mg q 8 weeks.  Stelara did not work as well.  Discussed the entyvio at shorter cycles of q 4 weeks to see if that will control syx better.  Outputs are higher and she may be volume depleted and maybe more entyvio will help.  last entyvio was last week and so a level won't be useful today.  Will just proceed with dose increase.    ============== 18 53 yo female with Crohn's disease and breast cancer and diverting ileostomy from Dr. Talamantes.  Skin issue - addressed by Dr. Best.  See Brooke Talamantes MD and enterostomal therapist.  AGWS.  No fcs.      Review of Systems  ConstitutionalDenies : body aches, fever, weight gain, weight loss  CardiovascularDenies : chest pain, heart racing/skipping, high blood pressure  RespiratoryDenies : chronic cough, shortness of breath, wheezing or asthma symptoms  GastrointestinalDenies : Abdominal Pain/discomfort, Anal/Rectal Pain or Itching, Anal Spasm, Black Stool, Bloating/belching/gaseouness, Change of Bowel Habit, Constipation, Diarrhea/Loose Stool, Difficulty in Swallowing, Heartburn/esophageal reflux, Hemorrhoids, Indigestion, Mucus in Stool, Nausea/vomiting, Rectal Bleeding, Unintentional Weight Loss    Vitals  Date Time BP Position Site L\R Cuff Size HR RR TEMP (F) WT  HT  BMI kg/m2 BSA m2 O2 Sat HC     2020 05:15 PM         133lbs 0oz 5'  8.5" 19.93 1.71          Assessment  Crohn's Disease     555.2  Diarrhea     787.91/R19.7  Cholelithiasis     574.20/K80.20  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 () - - 2020  EGD w/Biopsy if indicated (92423) - - 2020  Pouchoscopy (72480) - - 2020  CMP (comprehensive metabolic panel) (42333) - - 2020  Sed rate (96698) - - 2020  C-reactive protein (11763) - - 2020  Ferritin assay (24094) - - 2020  Iron + iron binding capacity panel ser/plas (98982, 86106) - - 2020  Vitamin B12 and folate measurement (33477, 57317) - - 2020  CBC with diff (04169) - - 2020  25-OH-Vitamin D (56801) - - 2020  GPP 22 (FilmArray) - Gastrointestinal Pathogen Panel - QDx (39445) - - 2020  MedicationsMedications have been Reconciled  Transition of Care or Provider Policy  InstructionsPatient seen today via telehealth by agreement and consent of patient in light of current COVID-19 pandemic. I used video conferencing during the visit. The patient encounter is appropriate and reasonable under the circumstances given the patient's particular presentation at this time. The patient has been advised of the followin) the potential risks and limitations of this mode of treatment (including but not limited to the absence of in-person examination); 2) the right to refuse telehealth services at any point without affecting the right to future care; 3) the right to receive in-person services, included immediately after this consultation if an urgent need arises; 4) information, including identifiable images or information from this telehealth consult, will only be shared in accordance with HIPPA regulations. Any and all of the patient's and/or patient's family member's questions on this issue have been answered. The patient has verbally consented to be treated via telehealth services. The patient has also been advised to contact this office for worsening conditions or problems, and seek emergency medical treatment and/or call 911 if the patient deems either necessary.  (For commercial payers, telehealth video only) More than half of the face-to-face time used for counseling and coordination of care.  40 min appointment (58457 EP)  I have documented a list of current medications and reviewed it with the patient.  I encouraged the patient to diet and exercise.  DispositionReturn To Clinic in 2 Months  CorrespondenceCC this document (Slade Auguste MD, Brooke Talamantes MD, Arianna Jaquez MD) - 2020    24717 exam med 32 min time  needs refill or lomotil Can't eRx lomotil.  Aciphex refill.  Needs EGD and pouchoscopy         Electronically Signed by: MD Cindy CarpioA

## 2022-10-19 NOTE — PHYSICAL EXAM GASTROINTESTINAL
Abdomen,  soft, tender, nondistended,  normal bowel sounds,  Liver and Spleen,  no hepatomegaly present

## 2022-11-04 ENCOUNTER — LAB OUTSIDE AN ENCOUNTER (OUTPATIENT)
Dept: URBAN - METROPOLITAN AREA CLINIC 98 | Facility: CLINIC | Age: 57
End: 2022-11-04

## 2022-11-30 ENCOUNTER — LAB OUTSIDE AN ENCOUNTER (OUTPATIENT)
Dept: URBAN - METROPOLITAN AREA CLINIC 98 | Facility: CLINIC | Age: 57
End: 2022-11-30

## 2022-12-01 LAB
A/G RATIO: 1.6
ALBUMIN: 4.4
ALKALINE PHOSPHATASE: 98
ALT (SGPT): 4
AST (SGOT): 8
BASO (ABSOLUTE): 0.1
BASOS: 1
BILIRUBIN, TOTAL: <0.2
BUN/CREATININE RATIO: 16
BUN: 12
C-REACTIVE PROTEIN, QUANT: 9
CALCIUM: 9.3
CARBON DIOXIDE, TOTAL: 19
CHLORIDE: 105
CREATININE: 0.77
EGFR: 90
EOS (ABSOLUTE): 0.1
EOS: 2
GLOBULIN, TOTAL: 2.8
GLUCOSE: 89
HEMATOCRIT: 34.7
HEMATOLOGY COMMENTS:: (no result)
HEMOGLOBIN: 12.6
IMMATURE CELLS: (no result)
IMMATURE GRANS (ABS): 0
IMMATURE GRANULOCYTES: 0
LYMPHS (ABSOLUTE): 1.7
LYMPHS: 30
MCH: 42
MCHC: 36.3
MCV: 116
MONOCYTES(ABSOLUTE): 0.5
MONOCYTES: 8
NEUTROPHILS (ABSOLUTE): 3.2
NEUTROPHILS: 59
NRBC: (no result)
PLATELETS: 164
POTASSIUM: 4
PROTEIN, TOTAL: 7.2
RBC: 3
RDW: 12.4
SEDIMENTATION RATE-WESTERGREN: 51
SODIUM: 140
WBC: 5.5

## 2022-12-09 ENCOUNTER — LAB OUTSIDE AN ENCOUNTER (OUTPATIENT)
Dept: URBAN - METROPOLITAN AREA CLINIC 98 | Facility: CLINIC | Age: 57
End: 2022-12-09

## 2023-01-27 ENCOUNTER — TELEPHONE ENCOUNTER (OUTPATIENT)
Dept: URBAN - METROPOLITAN AREA CLINIC 98 | Facility: CLINIC | Age: 58
End: 2023-01-27

## 2023-02-01 ENCOUNTER — OFFICE VISIT (OUTPATIENT)
Dept: URBAN - METROPOLITAN AREA CLINIC 98 | Facility: CLINIC | Age: 58
End: 2023-02-01
Payer: COMMERCIAL

## 2023-02-01 VITALS — WEIGHT: 126 LBS | BODY MASS INDEX: 18.66 KG/M2 | HEIGHT: 69 IN

## 2023-02-01 DIAGNOSIS — K50.80 CROHN'S DISEASE OF BOTH SMALL AND LARGE INTESTINE WITHOUT COMPLICATIONS: ICD-10-CM

## 2023-02-01 DIAGNOSIS — E83.42 HYPOMAGNESEMIA: ICD-10-CM

## 2023-02-01 DIAGNOSIS — R19.7 DIARRHEA: ICD-10-CM

## 2023-02-01 DIAGNOSIS — K80.20 CHOLELITHIASIS: ICD-10-CM

## 2023-02-01 DIAGNOSIS — K50.90 CROHN DISEASE: ICD-10-CM

## 2023-02-01 DIAGNOSIS — R10.84 ABDOMINAL PAIN, GENERALIZED: ICD-10-CM

## 2023-02-01 DIAGNOSIS — Z79.899 IMMUNOSUPPRESSION DUE TO DRUG THERAPY: ICD-10-CM

## 2023-02-01 PROBLEM — 71833008 CROHN'S DISEASE OF SMALL AND LARGE INTESTINES: Status: ACTIVE | Noted: 2022-06-20

## 2023-02-01 PROCEDURE — 99215 OFFICE O/P EST HI 40 MIN: CPT | Performed by: INTERNAL MEDICINE

## 2023-02-01 RX ORDER — ALENDRONATE SODIUM 70 MG
TABLET ORAL
Qty: 0 | Refills: 0 | Status: ACTIVE | COMMUNITY
Start: 1900-01-01

## 2023-02-01 RX ORDER — PROMETHAZINE HYDROCHLORIDE 25 MG/1
TAKE 1 TABLETS TABLET ORAL BID
Qty: 60 TABLETS | Refills: 5 | Status: ACTIVE | COMMUNITY
Start: 2021-02-12

## 2023-02-01 RX ORDER — RABEPRAZOLE SODIUM 20 MG/1
TAKE 1 TABLET BY MOUTH TWICE DAILY TABLET, DELAYED RELEASE ORAL
Qty: 180 | Refills: 3 | Status: ACTIVE | COMMUNITY

## 2023-02-01 RX ORDER — VEDOLIZUMAB 300 MG/5ML
INFUSE 300 MG OVER 30 MINUTE(S) BY INTRAVENOUS ROUTE EVERY 4 WEEKS INJECTION, POWDER, LYOPHILIZED, FOR SOLUTION INTRAVENOUS
Qty: 1 | Refills: 11 | Status: ACTIVE | COMMUNITY
Start: 1900-01-01

## 2023-02-01 RX ORDER — IBUPROFEN 100 MG/1
TAKE 2 TABLETS (200 MG) BY ORAL ROUTE EVERY 6 HOURS AS NEEDED WITH FOOD TABLET, COATED ORAL
Qty: 0 | Refills: 0 | Status: ACTIVE | COMMUNITY
Start: 1900-01-01

## 2023-02-01 RX ORDER — PREDNISONE 10 MG/1
TAKE 40 MG DAILY TABLET ORAL ONCE A DAY
Status: ACTIVE | COMMUNITY

## 2023-02-01 RX ORDER — DICYCLOMINE HYDROCHLORIDE 20 MG/1
TAKE 2 TABLETS TABLET ORAL
Qty: 240 TABLETS | Refills: 11 | Status: ACTIVE | COMMUNITY
Start: 2022-02-14 | End: 2023-02-12

## 2023-02-01 RX ORDER — IBUPROFEN 200 MG/1
TAKE 1 CAPSULE (200 MG) BY ORAL ROUTE EVERY 6 HOURS AS NEEDED CAPSULE, LIQUID FILLED ORAL
Qty: 0 | Refills: 0 | Status: ACTIVE | COMMUNITY
Start: 1900-01-01

## 2023-02-01 RX ORDER — ONDANSETRON HYDROCHLORIDE 8 MG/1
1 CAPSULE TABLET, FILM COATED ORAL TWICE DAILY
Qty: 180 CAPSULES | Refills: 3 | Status: ACTIVE | COMMUNITY
Start: 2022-09-29 | End: 2023-09-24

## 2023-02-01 RX ORDER — FOLIC ACID 5 MG
1 CAPSULE CAPSULE ORAL ONCE A DAY
Qty: 90 CAPSULE | Refills: 3 | Status: ACTIVE | COMMUNITY
Start: 2022-10-03 | End: 2023-09-28

## 2023-02-01 RX ORDER — BUDESONIDE 9 MG/1
1 TABLET IN THE MORNING TABLET, FILM COATED, EXTENDED RELEASE ORAL ONCE A DAY
Qty: 30 TABLETS | Refills: 11 | Status: ACTIVE | COMMUNITY
Start: 2022-06-15 | End: 2023-09-18

## 2023-02-01 RX ORDER — VITAMIN A 2400 MCG
1 TABLET CAPSULE ORAL ONCE A DAY
Qty: 90 TABLET | Refills: 1 | Status: ACTIVE | COMMUNITY
Start: 2022-01-12

## 2023-02-01 RX ORDER — DIPHENOXYLATE HCL/ATROPINE 2.5-.025MG
TAKE 2 TABLETS TABLET ORAL TWICE DAILY
Qty: 360 TABLETS | Refills: 1 | Status: ACTIVE | COMMUNITY
End: 2023-04-09

## 2023-02-01 RX ORDER — PROMETHAZINE HYDROCHLORIDE 6.25 MG/5ML
TAKE 5 ML BY MOUTH AT BEDTIME SOLUTION ORAL
Qty: 150 ML | Refills: 11 | Status: ACTIVE | COMMUNITY

## 2023-02-01 RX ORDER — RABEPRAZOLE SODIUM 20 MG/1
TAKE 1 TABLET TABLET, DELAYED RELEASE ORAL TWICE DAILY
Qty: 180 TABLETS | Refills: 6 | Status: ACTIVE | COMMUNITY
Start: 2019-02-17

## 2023-02-01 RX ORDER — RISANKIZUMAB-RZAA 360 MG/2.4
360 MG WEARABLE INJECTOR SUBCUTANEOUS
Qty: 1 KIT | Refills: 8 | Status: ACTIVE | COMMUNITY
Start: 2022-10-19 | End: 2024-03-06

## 2023-02-01 RX ORDER — LIDOCAINE HYDROCHLORIDE 20 MG/ML
SWISH AND SPIT 15ML BY MOUTH THREE TIMES A DAY SOLUTION ORAL; TOPICAL
Qty: 800 MILLILITER | Refills: 2 | Status: ACTIVE | COMMUNITY

## 2023-02-01 RX ORDER — LETROZOLE TABLETS 2.5 MG/1
TAKE 1 TABLET (2.5 MG) BY ORAL ROUTE ONCE DAILY TABLET, FILM COATED ORAL 1
Qty: 0 | Refills: 0 | Status: ACTIVE | COMMUNITY
Start: 1900-01-01

## 2023-02-01 RX ORDER — METHOTREXATE 25 MG/ML
INJECT 1 ML INTO THE MUSCLE EVERY 7 DAYS INJECTION, SOLUTION INTRA-ARTERIAL; INTRAMUSCULAR; INTRATHECAL; INTRAVENOUS
Qty: 4 VIALS | Refills: 6 | Status: ACTIVE | COMMUNITY

## 2023-02-01 NOTE — PHYSICAL EXAM GASTROINTESTINAL
Abdomen, soft, moderate abdominal tender, nondistended, normal bowel sounds, Liver and Spleen, no hepatomegaly present

## 2023-02-01 NOTE — PHYSICAL EXAM RECTAL:
normal tone, no external hemorrhoids, no masses palpable, no red blood, Tenderness on RADHIKA, Internal hemorrhoids present

## 2023-02-01 NOTE — PHYSICAL EXAM PSYCH:
normal mood with appropriate affect, cognitive function intact, speech clear Deep Sutures: 4-0 Monocryl

## 2023-02-01 NOTE — PHYSICAL EXAM NECK/THYROID:
normal appearance, no deformities, trachea midline, thyroid nontender Admit for left reverse total shoulder replacement    Cleared by For revision left reverse total shoulder replacement, removal of hardware, possible bone allograft   Admit to orthopedics

## 2023-02-01 NOTE — HPI-TODAY'S VISIT:
56 yo female with severe Crohn's on Skyrizi and budesonide 3mg one per day, methotrexate .5 cc qow --due to mouth ulcers, decreased from 1 cc per week, .5 cc per week and then the currnet .5 cc qow lomotil 2 bid Dicyclomine 4 (20)bid ----- should consider 2 qid Aciphex 1 bid  ESR has stayed elevated but CRP normalized on 22  Called our office Friday afternoon and spoke with Dr. Vuong. Called at 3:30p Friday and then called on call at 5p. c/o waking up with distension and hardness and gassy She thought it could be a partial blockage and  so she took colace It sounded like a running faucet but has had regular output. Dr. Talamantes wants a CT stat --- to expedite. Problems: Severe Crohn's disease h/o Breast cancer High ostomy outputs abdominal pain Anorexia Weight loss - 3 lbs Immunosuppression Abdominal distension  ============================ 10/19/22  56 yo female for 2 month f/u. TH from home  Decision made to switch to Skyrizi.  Started Skyrizi in [place] about 6 weeks ago. Next appt is  - iv Dose 3 --- Likellt start 9;/15-Dose 1 Delay in getting Entyvio change to Skyrizi. She is not sure if the Skyrizi is working.   It was 4 weeks--- not excessive    between last Entyvio and first Skyrizi.  Stooling is the same as 4 weeks ago. More abd pain Has heaviness and upper abdominal pain.   ====================================================== 22  56 yo female comes in with  Guzman for 3 week follow up.  She feels her Crohn's is better. Ileostomy outputs similar. 5-7 per day.  No blood. Mouth is much better. Less pain. Rectum is in place. Mucus and stool per rectum. Blood per rectum She feels closing her up is not an answer.  Problems with back --- spinal stenosis - Guzman Barajas MD [facility]. Hip and Knee hurt. Has an appointment with [facility].  Not eating much. Lost 6 more lbs. Been sick for 1.5 weeks - does not know. Headache and neck ache, could not lift head. Has been exhausted and dizzy. Can't walk in a straight line.  Has primary MD.    Slade Auguste MD - [place]. Less steady when whe walks. Very weak. Difficulty getting here for the appointment.  Not eating because not hung  Meds: Entyvio 300 mg iv q 4w Gabapentin 300 mg po tid started 3 weeks ago Lomotil Paxil Dicyclomine Folic acid Ibuprofen Trazodone  For upcoming back fusion with Dr Jenn Adkinsgic to tape, levoquin, sulfa   ===================== 22  56 yo female with severe CD on Entyvio 300 mg iv q 4 w and mtx 12.5 mg sq q week Budesonide 3 mg a day and folic acid.  Main complaints are: Mouth ulcers Abdominal pain Diarrhea- empties bad 7 times Bad day more than 10/ No blood in stool. Nausea - continuous - awakens with nausea. Eats and nausea. PPI- Aciphex 20 mg bid. Trazodone. No other meds  Next infusion next week.   ======================= 22  55 yo female comes in for urgent f/u re; need for Promethazine 6.25 mg/5ml solution as this works better for her when she has acute nausea and nausea unrelieved by ondansetron. I feel this is an appropriate need and should be approved by insurance even though it is non-formulary.  MRE is pending and plan per radiology is to use barium as volumen as she vomits with it.  She experiences nausea every day due to bowel dysfunction from active Crohns disease and other factors.  Meds: Entyvio 300 mg iv q 4 w Methotrexate 1 cc per week.  Wants to reduce to .5 cc per week and that is fine. Folic acid 1 mg a da\7 Budesonide 3 mg now, reduced from 9 m,g =============================== 22  55 yo female returns for 5 week f/u. Stoma is fully healed. No issues with the appliances. Emptying bag 5-6 times a day and coping.  She has been having abdominal pain for the past few weeks. It felt like someone was "trying to pull the uterus out" and she calls it scar tissue. There was nothing on US, to cause the pain. MRE from 21 showed ??problable low grade partial SBO but this may be dysfunction.  She came to see me for the iron deficiency anemia. She has noticed some bleeding.  She still has urgency to move her bowels. Yesterday was scary becasue she saw blood in the rectal fluid. Passes rectal fluid 8-10 times a day. At night, it can be horrendous. Bottom of diaper is full.  On Entyvio 300 mg iv q 4 Methotrexate 1 cc q week Budesonide -- 3m 3 per day.  -Was feeling bad 6 months ago or so and Bud was started. May be worth trying to taper   ============================= 22  55 yo female with CD and ileostomy comes in for 3 month f/u visit.  Finally healing from stoma revision 10/15/21.  Stoma never stuck out enough and kept   Dr. Talamantes did a stoma revision.  Mild "complication" as skin around the stoma, stoma had a "moat" around it.  Empties bag 5-6 times. Interval check. Eating ok. Mouth is healing from the leucovorin to lessen the effect of mtx on mouth.  Getting guidance from Myerson.  ===================================== 10/6/21  55 yo female with Crohn's and avascular necrosis  and saw Dr. Myerson 2 days ago and he did blood work has some plans - f/u in 3 weeks.  Sees Dr. Gardiner tomorrow to address major ostomy issues.  Considering ostomy revision.  Retracted stoma.  Saw her mother and needed more supplies due to leaking.  Entocort now for 10 weeks, 6 mg a day, empties ostomy 6-9 times.  Has leaked but not in recent weeks.  Never on 9 mg and capri 6 mg for 10 weeks.  Try alternating 6 mg and 3 mg on alternate days.    ========================  21  55 yo female with CD now for 1 month f/u after telehealth and doing better 5 weeks into course of Entocort tx.  Does havd avascular necrosis of hips and understands there are risks of similar complications with Entocort therapy.  Was on fosamax. I just called Dr. Myerson to arrange an appoinment.  Ostomy - empties it 6 times so far today. Occasional abdominal pain.  On Entyvio every 4 weeks Entocort 9 mg a day x 5 weeks.  Asked to have CD4/CD8 checked, by sister's immunologist.   ===================== 8/3/21  55 yo female for  f/u due to beeing weak and in bed. In ER last week and they sent her home.  Meds: Entyvio 300 mg q 4 weeks Entocort  x 1 week Off prednisone - had been on prednisone- she does not think it is what was helping her. On 1 cc mtx per week.  Devin Auguste is primary MD Saw him a month ago.  Main syx now.5 days ago was throwing up and was so distended. Could not poke her stomach. went to the ER Wednesday. N/V. Aciphex 1 twice daily. Last looked  Never had an ulcer. Ileostomy in place.  Fever on Friday, up to 101. Eating now. Hungry yesterday  Not vomiting.  May neede EGD.     ====================== 21  56 yo female comes in for f/u.  She is "off" per Dr. Jaquez.  She did an MRI. Dr. Jaquez reportedly thinks it is her meds. Has not seen a neurologist in years. Recent  labs were nl mostly with Hb 10.1, nl K-4.0 and Ca. Nl ESR and CRP, but had just finished medrol dose eliza.  Wants a Mg level. Labs to be sent to [facility].  Nl quantiferon.  Currently , getting Entyvio every 4 weeks.  Getting entyvio every 4 weeks. Taking 2 weeks off mtx due to stomatitis. If resuming, would reduce from 1 cc to .5 cc..  She is not sure how much she needs mtx and how much it has helped in the past. Mtx has very likely caused the stomatis. Or the pred medrol could have helped the mouth and belly. She has avn all over.  Myerson will see her and order bone scan  Addendum ------21 Has thicker stools Has to change her bag because of high outputs She says her stoma is an outie  She is on pred 20 mg and should taper off it by going to 10 mg to 5 mg for a week and then d/c Risks of prednisone discussed.  Had dropped mtx from 25 to 12.5 and will go back up to 25 mg a week. Consider [facility] referral if not improvimg at that point. ============ 21  56 yo female with CD and ostomy for 5 month f/u.  Abd is good. Ostomy is functioning. Sleep thru much of night.  Appetite and intake is better. Oral CD problematic. Has been in touch with Dr. Pepe ---- swish and spit.  Max-- 150. Current weight is 121.  Not taking supplements --  Boost and Ensure. 2-3 cans The y don't agree with her.. R Lactose free ice  She wants low dose prednisone. Lets try 2.5-5 mg a day.  ================== 20  56 yo female for 3 week TH f/u. Has upper abdominal pain that may be due to gallstones. Had referred her to Dr. Moreland but she did not call. She heard he was retiring and not. Has abdominal pain when she. Left upper quadrant or "upper rib cage" mostly left. He knows Dr. Talamantes. -  Frustrated---- blisters on lip. Torn open. Has not tried steroid rinse or paste in past. Magic  mouth wash helps. Does not reduce the blisters. Dr. Best- non steroid options for mouth   ======================== 10/23/20  10/23 56 yo female for 3 week TH f/u. Has oral Crohn's and I-C and stoma issues. Entyvio may not cover the EIM. Might try oral steroid lozenge --- pred paste and lozenges.  Entyvio 300 q 4 weeks Mtx 1 cc q week Pred 5 mg may stop. Bentyl -  Abd pain is ok No idea what triggered her admission Food induced -   Consider medrol dose eliza in the future  Extensive lab review CBC --- Hct 31.6/Hb 10.9 plt 120 CRP 22 ESR 56 Iron 33% Ferrintin good ================================================== 10/2/20  56 yo female with  Crohn's disease recently hosp 3 days at  for abd pain, N/V and got better with 3 dasy 3 days of iv steroids.  Surgery considered.  Was on 40 mg pred - intolerant anxiety  Now dropped to 30 mg a day. Now is 50-60% better than admission.  Surgery debated gallstones. They leaned toward blockage.  Has been on Stelara. Entyvio works better than Stelara. Mtx makes her tired She would like to up the dose of lomotil.  20  55 yo female with Crohnn's disease having continued active syx. Getting Entyvio every 4 weeks. Methotrexate 12.5 mg per week Dicyclomine tablets 2 po qid  == She has taken Xifaxan and it tears her up.   Then stop it. Does not like flagyl and does not help.  Pepto 1-2 doses a day, does help the diarreha. She uses Immodium every day.  Stooling ----- empties bag 6-10 times a day. Abdominal pain - when she eats and intermittent. Energy - gone.  Labs- Anemia - Hb lower CRP is higher ---34 (was 16).   ========================== 20  Crohn's disease.     55 yo female with Crohn's disease comes in for f/u.  Dr. Jaquez is considering a GYN etiology.  Needs a pouch exam.  Getting Entyvio every 4 weeks.  Stelara did not work as well as Entyvio.  Normally when she gets an infusion, she gets a bounce and has not.  Prometheus Vedo level 3/25, just before 4 week dose was great_ __ _32.5 and no antibodies.  Feb 10 labs looked good CRP 29.9 and cbc looked fine.  ESR elevation.  2 liters of miralax_ __ _ Try 6-8am _ __ _- and then an exam at 2-3 pm She asked about a pilonidal cyst and I directed to Dr. Talamantes  Xifaxan  Try pepto bismol 1-2 prn on days.    ===== 20  55 yo female comes in at my request for 3 week f/u of Crohn's due to new 4 x eleation of CRP at 29.9.  19 CRP was similarly elevated 4x at 2.1/.5  Leonid is checking CT abd-pelvis due to pelvic pain which is not characteristic of her Crohn's.  She is wondering about GYN.  Or spastic bladder.  Dr. Talamantes thought stable but wants to scope to be sure.  Empties bag 10x a day.That is more than 3-6 months ago and has leakage of the ostomy bag. Maybe more volume.  Very watery.  Review of labs from 2/10 show cRP 29.i9 but others are stable. CBC looks good with trace elevation of wbc and borderline anemia. Getting iv iron from Arianna Jaquez. Vit D is normal at 39. Albumin is normal at 4.5.  MRI 10/1/19 showed gallstones , present in past, and she has mild pain that is intermittent Liver tests are nl. Can assess with HIDA, and/or with surgical consult, e.g ???relative need for choly, high vs. low.    ================ 20  55 yo female with severe Crohn's disease and h/o breast cancer _ _ Arianna Jaquez MD on Letrasol. 10th year.  Abdominal pain is confusing.  Has gotten pain in the ovary and in the uterus. GYN-Ann Laird MD and did US was fine. Sent to PT for pelvic treatment.  At her comment, I reviewed [facility] imaging 16 nl CTE 5/3/17 nl GB US Oct 2 2019 "Cholelithiasis"   o/w MRE was nl - no inflammation. BUT THE MRI DID SHOW "PERSISTENT TETHERING OF UTERUS TO RECTAL STUMP"  On Entyvio 300 mg q 4 weeks.  Next dose in 2 weeks On mtx _ _.5 cc. Monthly b12 shots  Eating ok, some weight loss. Weight stable x 6 months and down 30 lb since _ __ _- she is comfortable with current weight.  ============= 19  55 yo female with severe Crohn's and h/o breast cancer comes in for 3 month f/u.  Doing poorly.  Daily abd pain which is intermittent.  When she eats she gets pain and nausea.  Taking zofran and phenergan.  Sleep pattern is awry.  Empties bag 5 times a day and going out of rectum.  Has a loop ileostomy that may be problematic.  Allergic to tape.  Currently on Entyvio 300 mg q 8 weeks.  Stelara did not work as well.  Discussed the entyvio at shorter cycles of q 4 weeks to see if that will control syx better.  Outputs are higher and she may be volume depleted and maybe more entyvio will help.  last entyvio was last week and so a level won't be useful today.  Will just proceed with dose increase.    ============== 18 53 yo female with Crohn's disease and breast cancer and diverting ileostomy from Dr. Talamantes.  Skin issue - addressed by Dr. Best.  See Brooke Talamantes MD and enterostomal therapist.  AGWS.  No fcs.      Review of Systems  ConstitutionalDenies : body aches, fever, weight gain, weight loss  CardiovascularDenies : chest pain, heart racing/skipping, high blood pressure  RespiratoryDenies : chronic cough, shortness of breath, wheezing or asthma symptoms  GastrointestinalDenies : Abdominal Pain/discomfort, Anal/Rectal Pain or Itching, Anal Spasm, Black Stool, Bloating/belching/gaseouness, Change of Bowel Habit, Constipation, Diarrhea/Loose Stool, Difficulty in Swallowing, Heartburn/esophageal reflux, Hemorrhoids, Indigestion, Mucus in Stool, Nausea/vomiting, Rectal Bleeding, Unintentional Weight Loss    Vitals  Date Time BP Position Site L\R Cuff Size HR RR TEMP (F) WT  HT  BMI kg/m2 BSA m2 O2 Sat HC     2020 05:15 PM         133lbs 0oz 5'  8.5" 19.93 1.71          Assessment  Crohn's Disease     555.2  Diarrhea     787.91/R19.7  Cholelithiasis     574.20/K80.20  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 () - - 2020  EGD w/Biopsy if indicated (01683) - - 2020  Pouchoscopy (06581) - - 2020  CMP (comprehensive metabolic panel) (44564) - - 2020  Sed rate (34294) - - 2020  C-reactive protein (95520) - - 2020  Ferritin assay (48449) - - 2020  Iron + iron binding capacity panel ser/plas (38105, 82976) - - 2020  Vitamin B12 and folate measurement (10310, 52964) - - 2020  CBC with diff (45791) - - 2020  25-OH-Vitamin D (94020) - - 2020  GPP 22 (FilmArray) - Gastrointestinal Pathogen Panel - QDx (84611) - - 2020  MedicationsMedications have been Reconciled  Transition of Care or Provider Policy  InstructionsPatient seen today via telehealth by agreement and consent of patient in light of current COVID-19 pandemic. I used video conferencing during the visit. The patient encounter is appropriate and reasonable under the circumstances given the patient's particular presentation at this time. The patient has been advised of the followin) the potential risks and limitations of this mode of treatment (including but not limited to the absence of in-person examination); 2) the right to refuse telehealth services at any point without affecting the right to future care; 3) the right to receive in-person services, included immediately after this consultation if an urgent need arises; 4) information, including identifiable images or information from this telehealth consult, will only be shared in accordance with HIPPA regulations. Any and all of the patient's and/or patient's family member's questions on this issue have been answered. The patient has verbally consented to be treated via telehealth services. The patient has also been advised to contact this office for worsening conditions or problems, and seek emergency medical treatment and/or call 911 if the patient deems either necessary.  (For commercial payers, telehealth video only) More than half of the face-to-face time used for counseling and coordination of care.  40 min appointment (75495 EP)  I have documented a list of current medications and reviewed it with the patient.  I encouraged the patient to diet and exercise.  DispositionReturn To Clinic in 2 Months  CorrespondenceCC this document (Slade Auguste MD, Brooke Talamantes MD, Arianna Jaquez MD) - 2020    63024 exam med 32 min time  needs refill or lomotil Can't eRx lomotil.  Aciphex refill.  Needs EGD and pouchoscopy         Electronically Signed by: Sánchez Perez MD -A  cc: note to Dr. Brooke Talamantes 23

## 2023-02-02 LAB
A/G RATIO: 1.5
ALBUMIN: 4.6
ALKALINE PHOSPHATASE: 79
ALT (SGPT): 7
AMYLASE: 53
AST (SGOT): 11
BASO (ABSOLUTE): 0
BASOS: 1
BILIRUBIN, TOTAL: 0.3
BUN/CREATININE RATIO: 11
BUN: 10
C-REACTIVE PROTEIN, QUANT: 66
CALCIUM: 9
CARBON DIOXIDE, TOTAL: 20
CHLORIDE: 100
CREATININE: 0.9
EGFR: 75
EOS (ABSOLUTE): 0.1
EOS: 1
FERRITIN, SERUM: 1262
GLOBULIN, TOTAL: 3.1
GLUCOSE: 86
HEMATOCRIT: 32.9
HEMATOLOGY COMMENTS:: (no result)
HEMOGLOBIN: 11.6
IMMATURE CELLS: (no result)
IMMATURE GRANS (ABS): 0.1
IMMATURE GRANULOCYTES: 1
IRON BIND.CAP.(TIBC): 197
IRON SATURATION: 26
IRON: 51
LIPASE: 18
LYMPHS (ABSOLUTE): 1.7
LYMPHS: 21
MCH: 40.4
MCHC: 35.3
MCV: 115
MONOCYTES(ABSOLUTE): 0.6
MONOCYTES: 8
NEUTROPHILS (ABSOLUTE): 5.2
NEUTROPHILS: 68
NRBC: (no result)
PLATELETS: 250
POTASSIUM: 3.4
PROTEIN, TOTAL: 7.7
RBC: 2.87
RDW: 11.5
SEDIMENTATION RATE-WESTERGREN: 73
SODIUM: 137
UIBC: 146
WBC: 7.7

## 2023-02-15 ENCOUNTER — ERX REFILL RESPONSE (OUTPATIENT)
Dept: URBAN - METROPOLITAN AREA CLINIC 98 | Facility: CLINIC | Age: 58
End: 2023-02-15

## 2023-02-15 RX ORDER — DICYCLOMINE HYDROCHLORIDE 20 MG/1
TAKE TWO TABLETS BY MOUTH FOUR TIMES A DAY TABLET ORAL
Qty: 240 TABLET | Refills: 11 | OUTPATIENT

## 2023-02-20 ENCOUNTER — OFFICE VISIT (OUTPATIENT)
Dept: URBAN - METROPOLITAN AREA CLINIC 98 | Facility: CLINIC | Age: 58
End: 2023-02-20

## 2023-02-24 ENCOUNTER — TELEPHONE ENCOUNTER (OUTPATIENT)
Dept: URBAN - METROPOLITAN AREA CLINIC 98 | Facility: CLINIC | Age: 58
End: 2023-02-24

## 2023-04-05 ENCOUNTER — TELEPHONE ENCOUNTER (OUTPATIENT)
Dept: URBAN - METROPOLITAN AREA CLINIC 98 | Facility: CLINIC | Age: 58
End: 2023-04-05

## 2023-04-05 ENCOUNTER — TELEPHONE ENCOUNTER (OUTPATIENT)
Dept: URBAN - METROPOLITAN AREA CLINIC 92 | Facility: CLINIC | Age: 58
End: 2023-04-05

## 2023-04-05 RX ORDER — ERGOCALCIFEROL CAPSULES, 1.25 MG/1
1 CAPSULE CAPSULE ORAL
Qty: 12 CAPSULES | Refills: 3 | OUTPATIENT
Start: 2023-04-05 | End: 2024-03-30

## 2023-04-05 RX ORDER — ERGOCALCIFEROL CAPSULES, 1.25 MG/1
1 CAPSULE CAPSULE ORAL
Qty: 12 CAPSULES | Refills: 3
Start: 2023-04-05 | End: 2024-03-30

## 2023-04-05 RX ORDER — OMEPRAZOLE 40 MG/1
1 CAPSULE 30 MINUTES BEFORE MORNING MEAL CAPSULE, DELAYED RELEASE ORAL ONCE A DAY
Qty: 90 CAPSULES | Refills: 3 | OUTPATIENT
Start: 2023-04-05

## 2023-04-12 ENCOUNTER — WEB ENCOUNTER (OUTPATIENT)
Dept: URBAN - METROPOLITAN AREA CLINIC 98 | Facility: CLINIC | Age: 58
End: 2023-04-12

## 2023-04-18 ENCOUNTER — OFFICE VISIT (OUTPATIENT)
Dept: URBAN - METROPOLITAN AREA CLINIC 98 | Facility: CLINIC | Age: 58
End: 2023-04-18
Payer: COMMERCIAL

## 2023-04-18 VITALS — HEIGHT: 69 IN | WEIGHT: 126 LBS | BODY MASS INDEX: 18.66 KG/M2

## 2023-04-18 DIAGNOSIS — Z79.899 IMMUNOSUPPRESSION DUE TO DRUG THERAPY: ICD-10-CM

## 2023-04-18 DIAGNOSIS — R19.7 DIARRHEA: ICD-10-CM

## 2023-04-18 DIAGNOSIS — E83.42 HYPOMAGNESEMIA: ICD-10-CM

## 2023-04-18 DIAGNOSIS — K50.80 CROHN'S DISEASE OF BOTH SMALL AND LARGE INTESTINE: ICD-10-CM

## 2023-04-18 PROCEDURE — 99215 OFFICE O/P EST HI 40 MIN: CPT | Performed by: INTERNAL MEDICINE

## 2023-04-18 RX ORDER — IBUPROFEN 100 MG/1
TAKE 2 TABLETS (200 MG) BY ORAL ROUTE EVERY 6 HOURS AS NEEDED WITH FOOD TABLET, COATED ORAL
Qty: 0 | Refills: 0 | Status: ACTIVE | COMMUNITY
Start: 1900-01-01

## 2023-04-18 RX ORDER — ONDANSETRON HYDROCHLORIDE 8 MG/1
1 CAPSULE TABLET, FILM COATED ORAL TWICE DAILY
Qty: 180 CAPSULES | Refills: 3 | Status: ACTIVE | COMMUNITY
Start: 2022-09-29 | End: 2023-09-24

## 2023-04-18 RX ORDER — METHOTREXATE 25 MG/ML
INJECT 1 ML INTO THE MUSCLE EVERY 7 DAYS INJECTION, SOLUTION INTRA-ARTERIAL; INTRAMUSCULAR; INTRATHECAL; INTRAVENOUS
Qty: 4 VIALS | Refills: 6 | Status: ACTIVE | COMMUNITY

## 2023-04-18 RX ORDER — RISANKIZUMAB-RZAA 360 MG/2.4
360 MG WEARABLE INJECTOR SUBCUTANEOUS
Qty: 1 KIT | Refills: 8 | Status: ACTIVE | COMMUNITY
Start: 2022-10-19 | End: 2024-03-06

## 2023-04-18 RX ORDER — FOLIC ACID 5 MG
1 CAPSULE CAPSULE ORAL ONCE A DAY
Qty: 90 CAPSULE | Refills: 3 | Status: ACTIVE | COMMUNITY
Start: 2022-10-03 | End: 2023-09-28

## 2023-04-18 RX ORDER — LIDOCAINE HYDROCHLORIDE 20 MG/ML
SWISH AND SPIT 15ML BY MOUTH THREE TIMES A DAY SOLUTION ORAL; TOPICAL
Qty: 800 MILLILITER | Refills: 2 | Status: ACTIVE | COMMUNITY

## 2023-04-18 RX ORDER — PREDNISONE 10 MG/1
TAKE 40 MG DAILY TABLET ORAL ONCE A DAY
Status: ACTIVE | COMMUNITY

## 2023-04-18 RX ORDER — PROMETHAZINE HYDROCHLORIDE 6.25 MG/5ML
TAKE 5 ML BY MOUTH AT BEDTIME SOLUTION ORAL
Qty: 150 ML | Refills: 11 | Status: ACTIVE | COMMUNITY

## 2023-04-18 RX ORDER — ERGOCALCIFEROL CAPSULES, 1.25 MG/1
1 CAPSULE CAPSULE ORAL
Qty: 12 CAPSULES | Refills: 3 | Status: ACTIVE | COMMUNITY
Start: 2023-04-05 | End: 2024-03-30

## 2023-04-18 RX ORDER — OMEPRAZOLE 40 MG/1
1 CAPSULE 30 MINUTES BEFORE MORNING MEAL CAPSULE, DELAYED RELEASE ORAL ONCE A DAY
Qty: 90 CAPSULES | Refills: 3 | Status: ACTIVE | COMMUNITY
Start: 2023-04-05

## 2023-04-18 RX ORDER — VEDOLIZUMAB 300 MG/5ML
INFUSE 300 MG OVER 30 MINUTE(S) BY INTRAVENOUS ROUTE EVERY 4 WEEKS INJECTION, POWDER, LYOPHILIZED, FOR SOLUTION INTRAVENOUS
Qty: 1 | Refills: 11 | Status: ACTIVE | COMMUNITY
Start: 1900-01-01

## 2023-04-18 RX ORDER — ALENDRONATE SODIUM 70 MG
TABLET ORAL
Qty: 0 | Refills: 0 | Status: ACTIVE | COMMUNITY
Start: 1900-01-01

## 2023-04-18 RX ORDER — IBUPROFEN 200 MG/1
TAKE 1 CAPSULE (200 MG) BY ORAL ROUTE EVERY 6 HOURS AS NEEDED CAPSULE, LIQUID FILLED ORAL
Qty: 0 | Refills: 0 | Status: ACTIVE | COMMUNITY
Start: 1900-01-01

## 2023-04-18 RX ORDER — RABEPRAZOLE SODIUM 20 MG/1
TAKE 1 TABLET TABLET, DELAYED RELEASE ORAL TWICE DAILY
Qty: 180 TABLETS | Refills: 6 | Status: ACTIVE | COMMUNITY
Start: 2019-02-17

## 2023-04-18 RX ORDER — DICYCLOMINE HYDROCHLORIDE 20 MG/1
TAKE TWO TABLETS BY MOUTH FOUR TIMES A DAY TABLET ORAL
Qty: 240 TABLET | Refills: 11 | Status: ACTIVE | COMMUNITY

## 2023-04-18 RX ORDER — BUDESONIDE 9 MG/1
1 TABLET IN THE MORNING TABLET, FILM COATED, EXTENDED RELEASE ORAL ONCE A DAY
Qty: 30 TABLETS | Refills: 11 | Status: ACTIVE | COMMUNITY
Start: 2022-06-15 | End: 2023-09-18

## 2023-04-18 RX ORDER — RABEPRAZOLE SODIUM 20 MG/1
TAKE 1 TABLET BY MOUTH TWICE DAILY TABLET, DELAYED RELEASE ORAL
Qty: 180 | Refills: 3 | Status: ACTIVE | COMMUNITY

## 2023-04-18 RX ORDER — LETROZOLE TABLETS 2.5 MG/1
TAKE 1 TABLET (2.5 MG) BY ORAL ROUTE ONCE DAILY TABLET, FILM COATED ORAL 1
Qty: 0 | Refills: 0 | Status: ACTIVE | COMMUNITY
Start: 1900-01-01

## 2023-04-18 RX ORDER — PROMETHAZINE HYDROCHLORIDE 25 MG/1
TAKE 1 TABLETS TABLET ORAL BID
Qty: 60 TABLETS | Refills: 5 | Status: ACTIVE | COMMUNITY
Start: 2021-02-12

## 2023-04-18 RX ORDER — VITAMIN A 2400 MCG
1 TABLET CAPSULE ORAL ONCE A DAY
Qty: 90 TABLET | Refills: 1 | Status: ACTIVE | COMMUNITY
Start: 2022-01-12

## 2023-04-18 NOTE — HPI-TODAY'S VISIT:
56 yo female with severe CD. Had pain and vomiting and was admitted to [facility] 3/3 and had ileostomy revision and resection of 6 feet of intestin on 3/6/23 under Dr. Talamantes's care. Hosp 2.5 weeks.  Feeling so much better. Less pain.  still on oxycodone 7.5 - GP gives it to her. Hope is to get off pain. No steroids. Off budesonide Off mtx. Just got 2nd injection of Skyrizi  23===========  56 yo female with severe Crohn's on Skyrizi and budesonide 3mg one per day, methotrexate .5 cc qow --due to mouth ulcers, decreased from 1 cc per week, .5 cc per week and then the currnet .5 cc qow lomotil 2 bid Dicyclomine 4 (20)bid ----- should consider 2 qid Aciphex 1 bid  ESR has stayed elevated but CRP normalized on 22  Called our office Friday afternoon and spoke with Dr. Vuong. Called at 3:30p Friday and then called on call at 5p. c/o waking up with distension and hardness and gassy She thought it could be a partial blockage and  so she took colace It sounded like a running faucet but has had regular output. Dr. Talamantes wants a CT stat --- to expedite. Problems: Severe Crohn's disease h/o Breast cancer High ostomy outputs abdominal pain Anorexia Weight loss - 3 lbs Immunosuppression Abdominal distension  ============================ 10/19/22  56 yo female for 2 month f/u. TH from home  Decision made to switch to Skyrizi.  Started Skyrizi in [place] about 6 weeks ago. Next appt is  - iv Dose 3 --- Likellt start 9;/15-Dose 1 Delay in getting Entyvio change to Skyrizi. She is not sure if the Skyrizi is working.   It was 4 weeks--- not excessive    between last Entyvio and first Skyrizi.  Stooling is the same as 4 weeks ago. More abd pain Has heaviness and upper abdominal pain.   ====================================================== 22  56 yo female comes in with  Guzman for 3 week follow up.  She feels her Crohn's is better. Ileostomy outputs similar. 5-7 per day.  No blood. Mouth is much better. Less pain. Rectum is in place. Mucus and stool per rectum. Blood per rectum She feels closing her up is not an answer.  Problems with back --- spinal stenosis - Guzman Barajas MD [facility]. Hip and Knee hurt. Has an appointment with [facility].  Not eating much. Lost 6 more lbs. Been sick for 1.5 weeks - does not know. Headache and neck ache, could not lift head. Has been exhausted and dizzy. Can't walk in a straight line.  Has primary MD.    Slade Auguste MD - [place]. Less steady when whe walks. Very weak. Difficulty getting here for the appointment.  Not eating because not hung  Meds: Entyvio 300 mg iv q 4w Gabapentin 300 mg po tid started 3 weeks ago Lomotil Paxil Dicyclomine Folic acid Ibuprofen Trazodone  For upcoming back fusion with Dr Jenn Adkinsgic to tape, levoquin, sulfa   ===================== 22  56 yo female with severe CD on Entyvio 300 mg iv q 4 w and mtx 12.5 mg sq q week Budesonide 3 mg a day and folic acid.  Main complaints are: Mouth ulcers Abdominal pain Diarrhea- empties bad 7 times Bad day more than 10/ No blood in stool. Nausea - continuous - awakens with nausea. Eats and nausea. PPI- Aciphex 20 mg bid. Trazodone. No other meds  Next infusion next week.   ======================= 22  57 yo female comes in for urgent f/u re; need for Promethazine 6.25 mg/5ml solution as this works better for her when she has acute nausea and nausea unrelieved by ondansetron. I feel this is an appropriate need and should be approved by insurance even though it is non-formulary.  MRE is pending and plan per radiology is to use barium as volumen as she vomits with it.  She experiences nausea every day due to bowel dysfunction from active Crohns disease and other factors.  Meds: Entyvio 300 mg iv q 4 w Methotrexate 1 cc per week.  Wants to reduce to .5 cc per week and that is fine. Folic acid 1 mg a da\7 Budesonide 3 mg now, reduced from 9 m,g =============================== 22  57 yo female returns for 5 week f/u. Stoma is fully healed. No issues with the appliances. Emptying bag 5-6 times a day and coping.  She has been having abdominal pain for the past few weeks. It felt like someone was "trying to pull the uterus out" and she calls it scar tissue. There was nothing on US, to cause the pain. MRE from 21 showed ??problable low grade partial SBO but this may be dysfunction.  She came to see me for the iron deficiency anemia. She has noticed some bleeding.  She still has urgency to move her bowels. Yesterday was scary becasue she saw blood in the rectal fluid. Passes rectal fluid 8-10 times a day. At night, it can be horrendous. Bottom of diaper is full.  On Entyvio 300 mg iv q 4 Methotrexate 1 cc q week Budesonide -- 3m 3 per day.  -Was feeling bad 6 months ago or so and Bud was started. May be worth trying to taper   ============================= 22  57 yo female with CD and ileostomy comes in for 3 month f/u visit.  Finally healing from stoma revision 10/15/21.  Stoma never stuck out enough and kept   Dr. Talamantes did a stoma revision.  Mild "complication" as skin around the stoma, stoma had a "moat" around it.  Empties bag 5-6 times. Interval check. Eating ok. Mouth is healing from the leucovorin to lessen the effect of mtx on mouth.  Getting guidance from Myerson.  ===================================== 10/6/21  57 yo female with Crohn's and avascular necrosis  and saw Dr. Myerson 2 days ago and he did blood work has some plans - f/u in 3 weeks.  Sees Dr. Gardiner tomorrow to address major ostomy issues.  Considering ostomy revision.  Retracted stoma.  Saw her mother and needed more supplies due to leaking.  Entocort now for 10 weeks, 6 mg a day, empties ostomy 6-9 times.  Has leaked but not in recent weeks.  Never on 9 mg and capri 6 mg for 10 weeks.  Try alternating 6 mg and 3 mg on alternate days.    ========================  21  57 yo female with CD now for 1 month f/u after telehealth and doing better 5 weeks into course of Entocort tx.  Does havd avascular necrosis of hips and understands there are risks of similar complications with Entocort therapy.  Was on fosamax. I just called Dr. Myerson to arrange an appoinment.  Ostomy - empties it 6 times so far today. Occasional abdominal pain.  On Entyvio every 4 weeks Entocort 9 mg a day x 5 weeks.  Asked to have CD4/CD8 checked, by sister's immunologist.   ===================== 8/3/21  57 yo female for  f/u due to beeing weak and in bed. In ER last week and they sent her home.  Meds: Entyvio 300 mg q 4 weeks Entocort  x 1 week Off prednisone - had been on prednisone- she does not think it is what was helping her. On 1 cc mtx per week.  Devin Auguste is primary MD Saw him a month ago.  Main syx now.5 days ago was throwing up and was so distended. Could not poke her stomach. went to the ER Wednesday. N/V. Aciphex 1 twice daily. Last looked  Never had an ulcer. Ileostomy in place.  Fever on Friday, up to 101. Eating now. Hungry yesterday  Not vomiting.  May neede EGD.     ====================== 21  56 yo female comes in for f/u.  She is "off" per Dr. Jaquez.  She did an MRI. Dr. Jaquez reportedly thinks it is her meds. Has not seen a neurologist in years. Recent  labs were nl mostly with Hb 10.1, nl K-4.0 and Ca. Nl ESR and CRP, but had just finished medrol dose eliza.  Wants a Mg level. Labs to be sent to [facility].  Nl quantiferon.  Currently , getting Entyvio every 4 weeks.  Getting entyvio every 4 weeks. Taking 2 weeks off mtx due to stomatitis. If resuming, would reduce from 1 cc to .5 cc..  She is not sure how much she needs mtx and how much it has helped in the past. Mtx has very likely caused the stomatis. Or the pred medrol could have helped the mouth and belly. She has avn all over.  Myerson will see her and order bone scan  Addendum ------21 Has thicker stools Has to change her bag because of high outputs She says her stoma is an outie  She is on pred 20 mg and should taper off it by going to 10 mg to 5 mg for a week and then d/c Risks of prednisone discussed.  Had dropped mtx from 25 to 12.5 and will go back up to 25 mg a week. Consider [place] referral if not improvimg at that point. ============ 21  56 yo female with CD and ostomy for 5 month f/u.  Abd is good. Ostomy is functioning. Sleep thru much of night.  Appetite and intake is better. Oral CD problematic. Has been in touch with Dr. Pepe ---- swish and spit.  Max-- 150. Current weight is 121.  Not taking supplements --  Boost and Ensure. 2-3 cans The y don't agree with her.. R Lactose free ice  She wants low dose prednisone. Lets try 2.5-5 mg a day.  ================== 20  56 yo female for 3 week TH f/u. Has upper abdominal pain that may be due to gallstones. Had referred her to Dr. Moreland but she did not call. She heard he was retiring and not. Has abdominal pain when she. Left upper quadrant or "upper rib cage" mostly left. He knows Dr. Talamantes. -  Frustrated---- blisters on lip. Torn open. Has not tried steroid rinse or paste in past. Magic  mouth wash helps. Does not reduce the blisters. Dr. Best- non steroid options for mouth   ======================== 10/23/20  10/23 56 yo female for 3 week TH f/u. Has oral Crohn's and I-C and stoma issues. Entyvio may not cover the EIM. Might try oral steroid lozenge --- pred paste and lozenges.  Entyvio 300 q 4 weeks Mtx 1 cc q week Pred 5 mg may stop. Bentyl -  Abd pain is ok No idea what triggered her admission Food induced -   Consider medrol dose eliza in the future  Extensive lab review CBC --- Hct 31.6/Hb 10.9 plt 120 CRP 22 ESR 56 Iron 33% Ferrintin good ================================================== 10/2/20  56 yo female with  Crohn's disease recently hosp 3 days at  for abd pain, N/V and got better with 3 dasy 3 days of iv steroids.  Surgery considered.  Was on 40 mg pred - intolerant anxiety  Now dropped to 30 mg a day. Now is 50-60% better than admission.  Surgery debated gallstones. They leaned toward blockage.  Has been on Stelara. Entyvio works better than Stelara. Mtx makes her tired She would like to up the dose of lomotil.  20  53 yo female with Crohnn's disease having continued active syx. Getting Entyvio every 4 weeks. Methotrexate 12.5 mg per week Dicyclomine tablets 2 po qid  == She has taken Xifaxan and it tears her up.   Then stop it. Does not like flagyl and does not help.  Pepto 1-2 doses a day, does help the diarreha. She uses Immodium every day.  Stooling ----- empties bag 6-10 times a day. Abdominal pain - when she eats and intermittent. Energy - gone.  Labs- Anemia - Hb lower CRP is higher ---34 (was 16).   ========================== 20  Crohn's disease.     53 yo female with Crohn's disease comes in for f/u.  Dr. Jaquez is considering a GYN etiology.  Needs a pouch exam.  Getting Entyvio every 4 weeks.  Stelara did not work as well as Entyvio.  Normally when she gets an infusion, she gets a bounce and has not.  Prometheus Vedo level 3/25, just before 4 week dose was great_ __ _32.5 and no antibodies.  Feb 10 labs looked good CRP 29.9 and cbc looked fine.  ESR elevation.  2 liters of miralax_ __ _ Try 6-8am _ __ _- and then an exam at 2-3 pm She asked about a pilonidal cyst and I directed to Dr. Talamantes  Xifaxan  Try pepto bismol 1-2 prn on days.    ===== 20  53 yo female comes in at my request for 3 week f/u of Crohn's due to new 4 x eleation of CRP at 29.9.  19 CRP was similarly elevated 4x at 2.1/.5  Leonid is checking CT abd-pelvis due to pelvic pain which is not characteristic of her Crohn's.  She is wondering about GYN.  Or spastic bladder.  Dr. Talamantes thought stable but wants to scope to be sure.  Empties bag 10x a day.That is more than 3-6 months ago and has leakage of the ostomy bag. Maybe more volume.  Very watery.  Review of labs from 2/10 show cRP 29.i9 but others are stable. CBC looks good with trace elevation of wbc and borderline anemia. Getting iv iron from Arianna Jaquez. Vit D is normal at 39. Albumin is normal at 4.5.  MRI 10/1/19 showed gallstones , present in past, and she has mild pain that is intermittent Liver tests are nl. Can assess with HIDA, and/or with surgical consult, e.g ???relative need for choly, high vs. low.    ================ 20  53 yo female with severe Crohn's disease and h/o breast cancer _ _ Arianna Jaquez MD on Letrasol. 10th year.  Abdominal pain is confusing.  Has gotten pain in the ovary and in the uterus. GYN-Ann Laird MD and did US was fine. Sent to PT for pelvic treatment.  At her comment, I reviewed [facility] imaging 16 nl CTE 5/3/17 nl GB US Oct 2 2019 "Cholelithiasis"   o/w MRE was nl - no inflammation. BUT THE MRI DID SHOW "PERSISTENT TETHERING OF UTERUS TO RECTAL STUMP"  On Entyvio 300 mg q 4 weeks.  Next dose in 2 weeks On mtx _ _.5 cc. Monthly b12 shots  Eating ok, some weight loss. Weight stable x 6 months and down 30 lb since _ __ _- she is comfortable with current weight.  ============= 19  53 yo female with severe Crohn's and h/o breast cancer comes in for 3 month f/u.  Doing poorly.  Daily abd pain which is intermittent.  When she eats she gets pain and nausea.  Taking zofran and phenergan.  Sleep pattern is awry.  Empties bag 5 times a day and going out of rectum.  Has a loop ileostomy that may be problematic.  Allergic to tape.  Currently on Entyvio 300 mg q 8 weeks.  Stelara did not work as well.  Discussed the entyvio at shorter cycles of q 4 weeks to see if that will control syx better.  Outputs are higher and she may be volume depleted and maybe more entyvio will help.  last entyvio was last week and so a level won't be useful today.  Will just proceed with dose increase.    ============== 18 53 yo female with Crohn's disease and breast cancer and diverting ileostomy from Dr. Talamantes.  Skin issue - addressed by Dr. Best.  See Brooke Talamantes MD and enterostomal therapist.  AGWS.  No fcs.      Review of Systems  ConstitutionalDenies : body aches, fever, weight gain, weight loss  CardiovascularDenies : chest pain, heart racing/skipping, high blood pressure  RespiratoryDenies : chronic cough, shortness of breath, wheezing or asthma symptoms  GastrointestinalDenies : Abdominal Pain/discomfort, Anal/Rectal Pain or Itching, Anal Spasm, Black Stool, Bloating/belching/gaseouness, Change of Bowel Habit, Constipation, Diarrhea/Loose Stool, Difficulty in Swallowing, Heartburn/esophageal reflux, Hemorrhoids, Indigestion, Mucus in Stool, Nausea/vomiting, Rectal Bleeding, Unintentional Weight Loss    Vitals  Date Time BP Position Site L\R Cuff Size HR RR TEMP (F) WT  HT  BMI kg/m2 BSA m2 O2 Sat HC     2020 05:15 PM         133lbs 0oz 5'  8.5" 19.93 1.71          Assessment  Crohn's Disease     555.2  Diarrhea     787.91/R19.7  Cholelithiasis     574.20/K80.20  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 () - - 2020  EGD w/Biopsy if indicated (79022) - - 2020  Pouchoscopy (42103) - - 2020  CMP (comprehensive metabolic panel) (01808) - - 2020  Sed rate (23811) - - 2020  C-reactive protein (44393) - - 2020  Ferritin assay (85494) - - 2020  Iron + iron binding capacity panel ser/plas (78040, 75975) - - 2020  Vitamin B12 and folate measurement (51232, 19837) - - 2020  CBC with diff (92642) - - 2020  25-OH-Vitamin D (07340) - - 2020  GPP 22 (FilmArray) - Gastrointestinal Pathogen Panel - QDx (69437) - - 2020  MedicationsMedications have been Reconciled  Transition of Care or Provider Policy  InstructionsPatient seen today via telehealth by agreement and consent of patient in light of current COVID-19 pandemic. I used video conferencing during the visit. The patient encounter is appropriate and reasonable under the circumstances given the patient's particular presentation at this time. The patient has been advised of the followin) the potential risks and limitations of this mode of treatment (including but not limited to the absence of in-person examination); 2) the right to refuse telehealth services at any point without affecting the right to future care; 3) the right to receive in-person services, included immediately after this consultation if an urgent need arises; 4) information, including identifiable images or information from this telehealth consult, will only be shared in accordance with HIPPA regulations. Any and all of the patient's and/or patient's family member's questions on this issue have been answered. The patient has verbally consented to be treated via telehealth services. The patient has also been advised to contact this office for worsening conditions or problems, and seek emergency medical treatment and/or call 911 if the patient deems either necessary.  (For commercial payers, telehealth video only) More than half of the face-to-face time used for counseling and coordination of care.  40 min appointment (87094 EP)  I have documented a list of current medications and reviewed it with the patient.  I encouraged the patient to diet and exercise.  DispositionReturn To Clinic in 2 Months  CorrespondenceCC this document (Slade Auguste MD, Brooke Talamantes MD, Arianna Jaquez MD) - 2020    16691 exam med 32 min time  needs refill or lomotil Can't eRx lomotil.  Aciphex refill.  Needs EGD and pouchoscopy         Electronically Signed by: MD MIGUEL A Carpio  cc: note to Dr. Brooke Talamantes 23

## 2023-04-19 LAB
FERRITIN, SERUM: 2454
FOLATE (FOLIC ACID), SERUM: >20
IRON BIND.CAP.(TIBC): <221
IRON SATURATION: >92
IRON: 204
UIBC: <17
VITAMIN B12: 400

## 2023-05-01 ENCOUNTER — OFFICE VISIT (OUTPATIENT)
Dept: URBAN - METROPOLITAN AREA CLINIC 98 | Facility: CLINIC | Age: 58
End: 2023-05-01

## 2023-06-09 ENCOUNTER — TELEPHONE ENCOUNTER (OUTPATIENT)
Dept: URBAN - METROPOLITAN AREA CLINIC 98 | Facility: CLINIC | Age: 58
End: 2023-06-09

## 2023-06-09 RX ORDER — RABEPRAZOLE SODIUM 20 MG/1
1 TABLET TABLET, DELAYED RELEASE ORAL ONCE A DAY
Qty: 90 TABLET | Refills: 3 | OUTPATIENT
Start: 2023-06-09

## 2023-07-10 ENCOUNTER — TELEPHONE ENCOUNTER (OUTPATIENT)
Dept: URBAN - METROPOLITAN AREA CLINIC 98 | Facility: CLINIC | Age: 58
End: 2023-07-10

## 2023-07-10 ENCOUNTER — OFFICE VISIT (OUTPATIENT)
Dept: URBAN - METROPOLITAN AREA CLINIC 98 | Facility: CLINIC | Age: 58
End: 2023-07-10
Payer: COMMERCIAL

## 2023-07-10 VITALS — HEIGHT: 69 IN | WEIGHT: 131 LBS | BODY MASS INDEX: 19.4 KG/M2

## 2023-07-10 DIAGNOSIS — K50.80 CROHN'S DISEASE OF BOTH SMALL AND LARGE INTESTINE: ICD-10-CM

## 2023-07-10 DIAGNOSIS — R19.7 DIARRHEA: ICD-10-CM

## 2023-07-10 DIAGNOSIS — R10.84 ABDOMINAL CRAMPING, GENERALIZED: ICD-10-CM

## 2023-07-10 DIAGNOSIS — K80.20 CHOLELITHIASIS: ICD-10-CM

## 2023-07-10 PROCEDURE — 99215 OFFICE O/P EST HI 40 MIN: CPT | Performed by: INTERNAL MEDICINE

## 2023-07-10 RX ORDER — RABEPRAZOLE SODIUM 20 MG/1
1 TABLET TABLET, DELAYED RELEASE ORAL ONCE A DAY
Qty: 90 TABLET | Refills: 3 | Status: ACTIVE | COMMUNITY
Start: 2023-06-09

## 2023-07-10 RX ORDER — IBUPROFEN 200 MG/1
TAKE 1 CAPSULE (200 MG) BY ORAL ROUTE EVERY 6 HOURS AS NEEDED CAPSULE, LIQUID FILLED ORAL
Qty: 0 | Refills: 0 | Status: ACTIVE | COMMUNITY
Start: 1900-01-01

## 2023-07-10 RX ORDER — PREDNISONE 10 MG/1
TAKE 40 MG DAILY TABLET ORAL ONCE A DAY
Status: ACTIVE | COMMUNITY

## 2023-07-10 RX ORDER — PROMETHAZINE HYDROCHLORIDE 25 MG/1
TAKE 1 TABLETS TABLET ORAL BID
Qty: 60 TABLETS | Refills: 5 | Status: ACTIVE | COMMUNITY
Start: 2021-02-12

## 2023-07-10 RX ORDER — RISANKIZUMAB-RZAA 360 MG/2.4
360 MG WEARABLE INJECTOR SUBCUTANEOUS
Qty: 1 KIT | Refills: 8 | Status: ACTIVE | COMMUNITY
Start: 2022-10-19 | End: 2024-03-06

## 2023-07-10 RX ORDER — METHOTREXATE 25 MG/ML
INJECT 1 ML INTO THE MUSCLE EVERY 7 DAYS INJECTION, SOLUTION INTRA-ARTERIAL; INTRAMUSCULAR; INTRATHECAL; INTRAVENOUS
Qty: 4 VIALS | Refills: 6 | Status: ACTIVE | COMMUNITY

## 2023-07-10 RX ORDER — RABEPRAZOLE SODIUM 20 MG/1
TAKE 1 TABLET BY MOUTH TWICE DAILY TABLET, DELAYED RELEASE ORAL
Qty: 180 | Refills: 3 | Status: ACTIVE | COMMUNITY

## 2023-07-10 RX ORDER — OMEPRAZOLE 40 MG/1
1 CAPSULE 30 MINUTES BEFORE MORNING MEAL CAPSULE, DELAYED RELEASE ORAL ONCE A DAY
Qty: 90 CAPSULES | Refills: 3 | Status: ACTIVE | COMMUNITY
Start: 2023-04-05

## 2023-07-10 RX ORDER — LETROZOLE TABLETS 2.5 MG/1
TAKE 1 TABLET (2.5 MG) BY ORAL ROUTE ONCE DAILY TABLET, FILM COATED ORAL 1
Qty: 0 | Refills: 0 | Status: ACTIVE | COMMUNITY
Start: 1900-01-01

## 2023-07-10 RX ORDER — ALENDRONATE SODIUM 70 MG
TABLET ORAL
Qty: 0 | Refills: 0 | Status: ACTIVE | COMMUNITY
Start: 1900-01-01

## 2023-07-10 RX ORDER — PROMETHAZINE HYDROCHLORIDE 6.25 MG/5ML
TAKE 5 ML BY MOUTH AT BEDTIME SOLUTION ORAL
Qty: 150 ML | Refills: 11 | Status: ACTIVE | COMMUNITY

## 2023-07-10 RX ORDER — DICYCLOMINE HYDROCHLORIDE 20 MG/1
TAKE TWO TABLETS BY MOUTH FOUR TIMES A DAY TABLET ORAL
Qty: 240 TABLET | Refills: 11 | Status: ACTIVE | COMMUNITY

## 2023-07-10 RX ORDER — BUDESONIDE 9 MG/1
1 TABLET IN THE MORNING TABLET, FILM COATED, EXTENDED RELEASE ORAL ONCE A DAY
Qty: 30 TABLETS | Refills: 11 | Status: ACTIVE | COMMUNITY
Start: 2022-06-15 | End: 2023-09-18

## 2023-07-10 RX ORDER — RABEPRAZOLE SODIUM 20 MG/1
TAKE 1 TABLET TABLET, DELAYED RELEASE ORAL TWICE DAILY
Qty: 180 TABLETS | Refills: 6 | Status: ACTIVE | COMMUNITY
Start: 2019-02-17

## 2023-07-10 RX ORDER — ONDANSETRON HYDROCHLORIDE 8 MG/1
1 CAPSULE TABLET, FILM COATED ORAL TWICE DAILY
Qty: 180 CAPSULES | Refills: 3 | Status: ACTIVE | COMMUNITY
Start: 2022-09-29 | End: 2023-09-24

## 2023-07-10 RX ORDER — VITAMIN A 2400 MCG
1 TABLET CAPSULE ORAL ONCE A DAY
Qty: 90 TABLET | Refills: 1 | Status: ACTIVE | COMMUNITY
Start: 2022-01-12

## 2023-07-10 RX ORDER — LIDOCAINE HYDROCHLORIDE 20 MG/ML
SWISH AND SPIT 15ML BY MOUTH THREE TIMES A DAY SOLUTION ORAL; TOPICAL
Qty: 800 MILLILITER | Refills: 2 | Status: ACTIVE | COMMUNITY

## 2023-07-10 RX ORDER — VEDOLIZUMAB 300 MG/5ML
INFUSE 300 MG OVER 30 MINUTE(S) BY INTRAVENOUS ROUTE EVERY 4 WEEKS INJECTION, POWDER, LYOPHILIZED, FOR SOLUTION INTRAVENOUS
Qty: 1 | Refills: 11 | Status: ACTIVE | COMMUNITY
Start: 1900-01-01

## 2023-07-10 RX ORDER — ERGOCALCIFEROL CAPSULES, 1.25 MG/1
1 CAPSULE CAPSULE ORAL
Qty: 12 CAPSULES | Refills: 3 | Status: ACTIVE | COMMUNITY
Start: 2023-04-05 | End: 2024-03-30

## 2023-07-10 RX ORDER — IBUPROFEN 100 MG/1
TAKE 2 TABLETS (200 MG) BY ORAL ROUTE EVERY 6 HOURS AS NEEDED WITH FOOD TABLET, COATED ORAL
Qty: 0 | Refills: 0 | Status: ACTIVE | COMMUNITY
Start: 1900-01-01

## 2023-07-10 RX ORDER — FOLIC ACID 5 MG
1 CAPSULE CAPSULE ORAL ONCE A DAY
Qty: 90 CAPSULE | Refills: 3 | Status: ACTIVE | COMMUNITY
Start: 2022-10-03 | End: 2023-09-28

## 2023-07-10 NOTE — HPI-TODAY'S VISIT:
59 yo female with h/o Breast cancer and Crohn's disease which is severe.  Doing well on Skyrizi. Diarrhea is better as result of Skyrizi and surgery. Arthritis is better.  Issues: 1) Ostomy outputs are increased.  taking more lomotil, increased lomotil from 2 to 3 per day and 3 immodium tid.  Consider tincture of opium.  Multiple checks for c diff ---- never been positive.  Eating better. Blood pressure is low.  Offered a diecitian - declines.  Will use tincture of opium 5 drop qid in lieu of lomotil.    ============================ 23  56 yo female with severe CD. Had pain and vomiting and was admitted to [facility] 3/3 and had ileostomy revision and resection of 6 feet of intestin on 3/6/23 under Dr. Talamantes's care. Hosp 2.5 weeks.  Feeling so much better. Less pain.  still on oxycodone 7.5 - GP gives it to her. Hope is to get off pain. No steroids. Off budesonide Off mtx. Just got 2nd injection of Skyrizi  23===========  56 yo female with severe Crohn's on Skyrizi and budesonide 3mg one per day, methotrexate .5 cc qow --due to mouth ulcers, decreased from 1 cc per week, .5 cc per week and then the currnet .5 cc qow lomotil 2 bid Dicyclomine 4 (20)bid ----- should consider 2 qid Aciphex 1 bid  ESR has stayed elevated but CRP normalized on 22  Called our office Friday afternoon and spoke with Dr. Vuong. Called at 3:30p Friday and then called on call at 5p. c/o waking up with distension and hardness and gassy She thought it could be a partial blockage and  so she took colace It sounded like a running faucet but has had regular output. Dr. Talamantes wants a CT stat --- to expedite. Problems: Severe Crohn's disease h/o Breast cancer High ostomy outputs abdominal pain Anorexia Weight loss - 3 lbs Immunosuppression Abdominal distension  ============================ 10/19/22  56 yo female for 2 month f/u. TH from home  Decision made to switch to Skyrizi.  Started Skyrizi in [place] about 6 weeks ago. Next appt is  - iv Dose 3 --- Likellt start 9;/15-Dose 1 Delay in getting Entyvio change to Skyrizi. She is not sure if the Skyrizi is working.   It was 4 weeks--- not excessive    between last Entyvio and first Skyrizi.  Stooling is the same as 4 weeks ago. More abd pain Has heaviness and upper abdominal pain.   ====================================================== 22  56 yo female comes in with  Guzman for 3 week follow up.  She feels her Crohn's is better. Ileostomy outputs similar. 5-7 per day.  No blood. Mouth is much better. Less pain. Rectum is in place. Mucus and stool per rectum. Blood per rectum She feels closing her up is not an answer.  Problems with back --- spinal stenosis - Guzman Barajas MD [facility]. Hip and Knee hurt. Has an appointment with [facility].  Not eating much. Lost 6 more lbs. Been sick for 1.5 weeks - does not know. Headache and neck ache, could not lift head. Has been exhausted and dizzy. Can't walk in a straight line.  Has primary MD.    Slade Auguste MD - [place]. Less steady when whe walks. Very weak. Difficulty getting here for the appointment.  Not eating because not hung  Meds: Entyvio 300 mg iv q 4w Gabapentin 300 mg po tid started 3 weeks ago Lomotil Paxil Dicyclomine Folic acid Ibuprofen Trazodone  For upcoming back fusion with Dr Jenn Adkinsgic to tape, levoquin, sulfa   ===================== 22  56 yo female with severe CD on Entyvio 300 mg iv q 4 w and mtx 12.5 mg sq q week Budesonide 3 mg a day and folic acid.  Main complaints are: Mouth ulcers Abdominal pain Diarrhea- empties bad 7 times Bad day more than 10/ No blood in stool. Nausea - continuous - awakens with nausea. Eats and nausea. PPI- Aciphex 20 mg bid. Trazodone. No other meds  Next infusion next week.   ======================= 22  57 yo female comes in for urgent f/u re; need for Promethazine 6.25 mg/5ml solution as this works better for her when she has acute nausea and nausea unrelieved by ondansetron. I feel this is an appropriate need and should be approved by insurance even though it is non-formulary.  MRE is pending and plan per radiology is to use barium as volumen as she vomits with it.  She experiences nausea every day due to bowel dysfunction from active Crohns disease and other factors.  Meds: Entyvio 300 mg iv q 4 w Methotrexate 1 cc per week.  Wants to reduce to .5 cc per week and that is fine. Folic acid 1 mg a da\7 Budesonide 3 mg now, reduced from 9 m,g =============================== 22  57 yo female returns for 5 week f/u. Stoma is fully healed. No issues with the appliances. Emptying bag 5-6 times a day and coping.  She has been having abdominal pain for the past few weeks. It felt like someone was "trying to pull the uterus out" and she calls it scar tissue. There was nothing on US, to cause the pain. MRE from 21 showed ??problable low grade partial SBO but this may be dysfunction.  She came to see me for the iron deficiency anemia. She has noticed some bleeding.  She still has urgency to move her bowels. Yesterday was scary becasue she saw blood in the rectal fluid. Passes rectal fluid 8-10 times a day. At night, it can be horrendous. Bottom of diaper is full.  On Entyvio 300 mg iv q 4 Methotrexate 1 cc q week Budesonide -- 3m 3 per day.  -Was feeling bad 6 months ago or so and Bud was started. May be worth trying to taper   ============================= 22  57 yo female with CD and ileostomy comes in for 3 month f/u visit.  Finally healing from stoma revision 10/15/21.  Stoma never stuck out enough and kept   Dr. Talamantes did a stoma revision.  Mild "complication" as skin around the stoma, stoma had a "moat" around it.  Empties bag 5-6 times. Interval check. Eating ok. Mouth is healing from the leucovorin to lessen the effect of mtx on mouth.  Getting guidance from Myerson.  ===================================== 10/6/21  57 yo female with Crohn's and avascular necrosis  and saw Dr. Myerson 2 days ago and he did blood work has some plans - f/u in 3 weeks.  Sees Dr. Gardiner tomorrow to address major ostomy issues.  Considering ostomy revision.  Retracted stoma.  Saw her mother and needed more supplies due to leaking.  Entocort now for 10 weeks, 6 mg a day, empties ostomy 6-9 times.  Has leaked but not in recent weeks.  Never on 9 mg and capri 6 mg for 10 weeks.  Try alternating 6 mg and 3 mg on alternate days.    ========================  21  57 yo female with CD now for 1 month f/u after telehealth and doing better 5 weeks into course of Entocort tx.  Does havd avascular necrosis of hips and understands there are risks of similar complications with Entocort therapy.  Was on fosamax. I just called Dr. Myerson to arrange an appoinment.  Ostomy - empties it 6 times so far today. Occasional abdominal pain.  On Entyvio every 4 weeks Entocort 9 mg a day x 5 weeks.  Asked to have CD4/CD8 checked, by sister's immunologist.   ===================== 8/3/21  57 yo female for  f/u due to beeing weak and in bed. In ER last week and they sent her home.  Meds: Entyvio 300 mg q 4 weeks Entocort  x 1 week Off prednisone - had been on prednisone- she does not think it is what was helping her. On 1 cc mtx per week.  Devin Auguste is primary MD Saw him a month ago.  Main syx now.5 days ago was throwing up and was so distended. Could not poke her stomach. went to the ER Wednesday. N/V. Aciphex 1 twice daily. Last looked  Never had an ulcer. Ileostomy in place.  Fever on Friday, up to 101. Eating now. Hungry yesterday  Not vomiting.  May neede EGD.     ====================== 21  54 yo female comes in for f/u.  She is "off" per Dr. Jaquez.  She did an MRI. Dr. Jaquez reportedly thinks it is her meds. Has not seen a neurologist in years. Recent  labs were nl mostly with Hb 10.1, nl K-4.0 and Ca. Nl ESR and CRP, but had just finished medrol dose eliza.  Wants a Mg level. Labs to be sent to [facility].  Nl quantiferon.  Currently , getting Entyvio every 4 weeks.  Getting entyvio every 4 weeks. Taking 2 weeks off mtx due to stomatitis. If resuming, would reduce from 1 cc to .5 cc..  She is not sure how much she needs mtx and how much it has helped in the past. Mtx has very likely caused the stomatis. Or the pred medrol could have helped the mouth and belly. She has avn all over.  Myerson will see her and order bone scan  Addendum ------21 Has thicker stools Has to change her bag because of high outputs She says her stoma is an outie  She is on pred 20 mg and should taper off it by going to 10 mg to 5 mg for a week and then d/c Risks of prednisone discussed.  Had dropped mtx from 25 to 12.5 and will go back up to 25 mg a week. Consider [place] referral if not improvimg at that point. ============ 21  54 yo female with CD and ostomy for 5 month f/u.  Abd is good. Ostomy is functioning. Sleep thru much of night.  Appetite and intake is better. Oral CD problematic. Has been in touch with Dr. Pepe ---- swish and spit.  Max-- 150. Current weight is 121.  Not taking supplements --  Boost and Ensure. 2-3 cans The y don't agree with her.. R Lactose free ice  She wants low dose prednisone. Lets try 2.5-5 mg a day.  ================== 20  54 yo female for 3 week TH f/u. Has upper abdominal pain that may be due to gallstones. Had referred her to Dr. Moreland but she did not call. She heard he was retiring and not. Has abdominal pain when she. Left upper quadrant or "upper rib cage" mostly left. He knows Dr. Talamantes. -  Frustrated---- blisters on lip. Torn open. Has not tried steroid rinse or paste in past. Magic  mouth wash helps. Does not reduce the blisters. Dr. Best- non steroid options for mouth   ======================== 10/23/20  10/23 54 yo female for 3 week TH f/u. Has oral Crohn's and I-C and stoma issues. Entyvio may not cover the EIM. Might try oral steroid lozenge --- pred paste and lozenges.  Entyvio 300 q 4 weeks Mtx 1 cc q week Pred 5 mg may stop. Bentyl -  Abd pain is ok No idea what triggered her admission Food induced -   Consider medrol dose eliza in the future  Extensive lab review CBC --- Hct 31.6/Hb 10.9 plt 120 CRP 22 ESR 56 Iron 33% Ferrintin good ================================================== 10/2/20  54 yo female with  Crohn's disease recently hosp 3 days at  for abd pain, N/V and got better with 3 dasy 3 days of iv steroids.  Surgery considered.  Was on 40 mg pred - intolerant anxiety  Now dropped to 30 mg a day. Now is 50-60% better than admission.  Surgery debated gallstones. They leaned toward blockage.  Has been on Stelara. Entyvio works better than Stelara. Mtx makes her tired She would like to up the dose of lomotil.  20  53 yo female with Crohnn's disease having continued active syx. Getting Entyvio every 4 weeks. Methotrexate 12.5 mg per week Dicyclomine tablets 2 po qid  == She has taken Xifaxan and it tears her up.   Then stop it. Does not like flagyl and does not help.  Pepto 1-2 doses a day, does help the diarreha. She uses Immodium every day.  Stooling ----- empties bag 6-10 times a day. Abdominal pain - when she eats and intermittent. Energy - gone.  Labs- Anemia - Hb lower CRP is higher ---34 (was 16).   ========================== 20  Crohn's disease.     53 yo female with Crohn's disease comes in for f/u.  Dr. Jaquez is considering a GYN etiology.  Needs a pouch exam.  Getting Entyvio every 4 weeks.  Stelara did not work as well as Entyvio.  Normally when she gets an infusion, she gets a bounce and has not.  Prometheus Vedo level 3/25, just before 4 week dose was great_ __ _32.5 and no antibodies.  Feb 10 labs looked good CRP 29.9 and cbc looked fine.  ESR elevation.  2 liters of miralax_ __ _ Try 6-8am _ __ _- and then an exam at 2-3 pm She asked about a pilonidal cyst and I directed to Dr. Albin Owen  Try pepto bismol 1-2 prn on days.    ===== 20  53 yo female comes in at my request for 3 week f/u of Crohn's due to new 4 x eleation of CRP at 29.9.  19 CRP was similarly elevated 4x at 2.1/.5  Leonid is checking CT abd-pelvis due to pelvic pain which is not characteristic of her Crohn's.  She is wondering about GYN.  Or spastic bladder.  Dr. Talamantes thought stable but wants to scope to be sure.  Empties bag 10x a day.That is more than 3-6 months ago and has leakage of the ostomy bag. Maybe more volume.  Very watery.  Review of labs from 2/10 show cRP 29.i9 but others are stable. CBC looks good with trace elevation of wbc and borderline anemia. Getting iv iron from Arianna Jaquez. Vit D is normal at 39. Albumin is normal at 4.5.  MRI 10/1/19 showed gallstones , present in past, and she has mild pain that is intermittent Liver tests are nl. Can assess with HIDA, and/or with surgical consult, e.g ???relative need for choly, high vs. low.    ================ 20  53 yo female with severe Crohn's disease and h/o breast cancer _ _ Arianna Jaquez MD on Letrasol. 10th year.  Abdominal pain is confusing.  Has gotten pain in the ovary and in the uterus. GYN-Ann Laird MD and did US was fine. Sent to PT for pelvic treatment.  At her comment, I reviewed [facility] imaging 16 nl CTE 5/3/17 nl GB US Oct 2 2019 "Cholelithiasis"   o/w MRE was nl - no inflammation. BUT THE MRI DID SHOW "PERSISTENT TETHERING OF UTERUS TO RECTAL STUMP"  On Entyvio 300 mg q 4 weeks.  Next dose in 2 weeks On mtx _ _.5 cc. Monthly b12 shots  Eating ok, some weight loss. Weight stable x 6 months and down 30 lb since _ __ _- she is comfortable with current weight.  ============= 19  53 yo female with severe Crohn's and h/o breast cancer comes in for 3 month f/u.  Doing poorly.  Daily abd pain which is intermittent.  When she eats she gets pain and nausea.  Taking zofran and phenergan.  Sleep pattern is awry.  Empties bag 5 times a day and going out of rectum.  Has a loop ileostomy that may be problematic.  Allergic to tape.  Currently on Entyvio 300 mg q 8 weeks.  Stelara did not work as well.  Discussed the entyvio at shorter cycles of q 4 weeks to see if that will control syx better.  Outputs are higher and she may be volume depleted and maybe more entyvio will help.  last entyvio was last week and so a level won't be useful today.  Will just proceed with dose increase.    ============== 18 53 yo female with Crohn's disease and breast cancer and diverting ileostomy from Dr. Talamantes.  Skin issue - addressed by Dr. Best.  See Brooke Talamantes MD and enterostomal therapist.  AGWS.  No fcs.      Review of Systems  ConstitutionalDenies : body aches, fever, weight gain, weight loss  CardiovascularDenies : chest pain, heart racing/skipping, high blood pressure  RespiratoryDenies : chronic cough, shortness of breath, wheezing or asthma symptoms  GastrointestinalDenies : Abdominal Pain/discomfort, Anal/Rectal Pain or Itching, Anal Spasm, Black Stool, Bloating/belching/gaseouness, Change of Bowel Habit, Constipation, Diarrhea/Loose Stool, Difficulty in Swallowing, Heartburn/esophageal reflux, Hemorrhoids, Indigestion, Mucus in Stool, Nausea/vomiting, Rectal Bleeding, Unintentional Weight Loss    Vitals  Date Time BP Position Site L\R Cuff Size HR RR TEMP (F) WT  HT  BMI kg/m2 BSA m2 O2 Sat HC     2020 05:15 PM         133lbs 0oz 5'  8.5" 19.93 1.71          Assessment  Crohn's Disease     555.2  Diarrhea     787.91/R19.7  Cholelithiasis     574.20/K80.20  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 () - - 2020  EGD w/Biopsy if indicated (59671) - - 2020  Pouchoscopy (20563) - - 2020  CMP (comprehensive metabolic panel) (99811) - - 2020  Sed rate (21044) - - 2020  C-reactive protein (03963) - - 2020  Ferritin assay (67970) - - 2020  Iron + iron binding capacity panel ser/plas (32623, 73652) - - 2020  Vitamin B12 and folate measurement (61229, 04743) - - 2020  CBC with diff (86549) - - 2020  25-OH-Vitamin D (66123) - - 2020  GPP 22 (FilmArray) - Gastrointestinal Pathogen Panel - QDx (51821) - - 2020  MedicationsMedications have been Reconciled  Transition of Care or Provider Policy  InstructionsPatient seen today via telehealth by agreement and consent of patient in light of current COVID-19 pandemic. I used video conferencing during the visit. The patient encounter is appropriate and reasonable under the circumstances given the patient's particular presentation at this time. The patient has been advised of the followin) the potential risks and limitations of this mode of treatment (including but not limited to the absence of in-person examination); 2) the right to refuse telehealth services at any point without affecting the right to future care; 3) the right to receive in-person services, included immediately after this consultation if an urgent need arises; 4) information, including identifiable images or information from this telehealth consult, will only be shared in accordance with HIPPA regulations. Any and all of the patient's and/or patient's family member's questions on this issue have been answered. The patient has verbally consented to be treated via telehealth services. The patient has also been advised to contact this office for worsening conditions or problems, and seek emergency medical treatment and/or call 911 if the patient deems either necessary.  (For commercial payers, telehealth video only) More than half of the face-to-face time used for counseling and coordination of care.  40 min appointment (97860 EP)  I have documented a list of current medications and reviewed it with the patient.  I encouraged the patient to diet and exercise.  DispositionReturn To Clinic in 2 Months  CorrespondenceCC this document (Slade Auguste MD, Brooke Talamantes MD, Arianna Jaquez MD) - 2020    39987 exam med 32 min time  needs refill or lomotil Can't eRx lomotil.  Aciphex refill.  Needs EGD and pouchoscopy         Electronically Signed by: Sánchez Perez MD -A  cc: note to Dr. Brooke Talamantes 23

## 2023-07-11 LAB
A/G RATIO: 1.7
ALBUMIN: 5
ALKALINE PHOSPHATASE: 84
ALT (SGPT): 7
AST (SGOT): 12
BASO (ABSOLUTE): 0
BASOS: 0
BILIRUBIN, TOTAL: <0.2
BUN/CREATININE RATIO: 21
BUN: 33
C-REACTIVE PROTEIN, QUANT: 4
CALCIUM: 9.4
CARBON DIOXIDE, TOTAL: 15
CHLORIDE: 99
CREATININE: 1.55
EGFR: 39
EOS (ABSOLUTE): 0.2
EOS: 2
GLOBULIN, TOTAL: 2.9
GLUCOSE: 93
HEMATOCRIT: 33.9
HEMATOLOGY COMMENTS:: (no result)
HEMOGLOBIN: 12.1
IMMATURE CELLS: (no result)
IMMATURE GRANS (ABS): 0
IMMATURE GRANULOCYTES: 0
LYMPHS (ABSOLUTE): 2.5
LYMPHS: 27
MAGNESIUM: 1
MCH: 38.8
MCHC: 35.7
MCV: 109
MONOCYTES(ABSOLUTE): 0.6
MONOCYTES: 6
NEUTROPHILS (ABSOLUTE): 6
NEUTROPHILS: 65
NRBC: (no result)
PLATELETS: 196
POTASSIUM: 4.5
PROTEIN, TOTAL: 7.9
RBC: 3.12
RDW: 12.8
SEDIMENTATION RATE-WESTERGREN: 16
SODIUM: 135
WBC: 9.4

## 2023-07-27 ENCOUNTER — TELEPHONE ENCOUNTER (OUTPATIENT)
Dept: URBAN - METROPOLITAN AREA CLINIC 98 | Facility: CLINIC | Age: 58
End: 2023-07-27

## 2023-07-27 RX ORDER — IBUPROFEN 200 MG/1
TAKE 1 CAPSULE (200 MG) BY ORAL ROUTE EVERY 6 HOURS AS NEEDED CAPSULE, LIQUID FILLED ORAL
Qty: 0 | Refills: 0 | Status: ACTIVE | COMMUNITY
Start: 1900-01-01

## 2023-07-27 RX ORDER — LETROZOLE TABLETS 2.5 MG/1
TAKE 1 TABLET (2.5 MG) BY ORAL ROUTE ONCE DAILY TABLET, FILM COATED ORAL 1
Qty: 0 | Refills: 0 | Status: ACTIVE | COMMUNITY
Start: 1900-01-01

## 2023-07-27 RX ORDER — PROMETHAZINE HYDROCHLORIDE 25 MG/1
TAKE 1 TABLETS TABLET ORAL BID
Qty: 60 TABLETS | Refills: 5 | Status: ACTIVE | COMMUNITY
Start: 2021-02-12

## 2023-07-27 RX ORDER — DICYCLOMINE HYDROCHLORIDE 20 MG/1
TAKE TWO TABLETS BY MOUTH FOUR TIMES A DAY TABLET ORAL
Qty: 240 TABLET | Refills: 11 | Status: ACTIVE | COMMUNITY

## 2023-07-27 RX ORDER — LIDOCAINE HYDROCHLORIDE 20 MG/ML
SWISH AND SPIT 15ML BY MOUTH THREE TIMES A DAY SOLUTION ORAL; TOPICAL
Qty: 800 MILLILITER | Refills: 2 | Status: ACTIVE | COMMUNITY

## 2023-07-27 RX ORDER — ATOGEPANT 10 MG/1
1 TABLET TABLET ORAL ONCE A DAY
Status: ACTIVE | COMMUNITY

## 2023-07-27 RX ORDER — BUDESONIDE 9 MG/1
1 TABLET IN THE MORNING TABLET, FILM COATED, EXTENDED RELEASE ORAL ONCE A DAY
Qty: 30 TABLETS | Refills: 11 | Status: ACTIVE | COMMUNITY
Start: 2022-06-15 | End: 2023-09-18

## 2023-07-27 RX ORDER — RABEPRAZOLE SODIUM 20 MG/1
TAKE 1 TABLET BY MOUTH TWICE DAILY TABLET, DELAYED RELEASE ORAL
Qty: 180 | Refills: 3 | Status: ACTIVE | COMMUNITY

## 2023-07-27 RX ORDER — ERGOCALCIFEROL CAPSULES, 1.25 MG/1
1 CAPSULE CAPSULE ORAL
Qty: 12 CAPSULES | Refills: 3 | Status: ACTIVE | COMMUNITY
Start: 2023-04-05 | End: 2024-03-30

## 2023-07-27 RX ORDER — FOLIC ACID 5 MG
1 CAPSULE CAPSULE ORAL ONCE A DAY
Qty: 90 CAPSULE | Refills: 3 | Status: ACTIVE | COMMUNITY
Start: 2022-10-03 | End: 2023-09-28

## 2023-07-27 RX ORDER — ALENDRONATE SODIUM 70 MG
TABLET ORAL
Qty: 0 | Refills: 0 | Status: ACTIVE | COMMUNITY
Start: 1900-01-01

## 2023-07-27 RX ORDER — METHOTREXATE 25 MG/ML
INJECT 1 ML INTO THE MUSCLE EVERY 7 DAYS INJECTION, SOLUTION INTRA-ARTERIAL; INTRAMUSCULAR; INTRATHECAL; INTRAVENOUS
Qty: 4 VIALS | Refills: 6 | Status: ACTIVE | COMMUNITY

## 2023-07-27 RX ORDER — RABEPRAZOLE SODIUM 20 MG/1
TAKE 1 TABLET TABLET, DELAYED RELEASE ORAL TWICE DAILY
Qty: 180 TABLETS | Refills: 6 | Status: ACTIVE | COMMUNITY
Start: 2019-02-17

## 2023-07-27 RX ORDER — OMEPRAZOLE 40 MG/1
1 CAPSULE 30 MINUTES BEFORE MORNING MEAL CAPSULE, DELAYED RELEASE ORAL ONCE A DAY
Qty: 90 CAPSULES | Refills: 3 | Status: ACTIVE | COMMUNITY
Start: 2023-04-05

## 2023-07-27 RX ORDER — IBUPROFEN 100 MG/1
TAKE 2 TABLETS (200 MG) BY ORAL ROUTE EVERY 6 HOURS AS NEEDED WITH FOOD TABLET, COATED ORAL
Qty: 0 | Refills: 0 | Status: ACTIVE | COMMUNITY
Start: 1900-01-01

## 2023-07-27 RX ORDER — PROMETHAZINE HYDROCHLORIDE 6.25 MG/5ML
TAKE 5 ML BY MOUTH AT BEDTIME SOLUTION ORAL
Qty: 150 ML | Refills: 11 | Status: ACTIVE | COMMUNITY

## 2023-07-27 RX ORDER — VEDOLIZUMAB 300 MG/5ML
INFUSE 300 MG OVER 30 MINUTE(S) BY INTRAVENOUS ROUTE EVERY 4 WEEKS INJECTION, POWDER, LYOPHILIZED, FOR SOLUTION INTRAVENOUS
Qty: 1 | Refills: 11 | Status: ACTIVE | COMMUNITY
Start: 1900-01-01

## 2023-07-27 RX ORDER — RISANKIZUMAB-RZAA 360 MG/2.4
360 MG WEARABLE INJECTOR SUBCUTANEOUS
Qty: 1 KIT | Refills: 8 | Status: ACTIVE | COMMUNITY
Start: 2022-10-19 | End: 2024-03-06

## 2023-07-27 RX ORDER — PREDNISONE 10 MG/1
TAKE 40 MG DAILY TABLET ORAL ONCE A DAY
Status: ACTIVE | COMMUNITY

## 2023-07-27 RX ORDER — VITAMIN A 2400 MCG
1 TABLET CAPSULE ORAL ONCE A DAY
Qty: 90 TABLET | Refills: 1 | Status: ACTIVE | COMMUNITY
Start: 2022-01-12

## 2023-07-27 RX ORDER — ONDANSETRON HYDROCHLORIDE 8 MG/1
1 CAPSULE TABLET, FILM COATED ORAL TWICE DAILY
Qty: 180 CAPSULES | Refills: 3 | Status: ACTIVE | COMMUNITY
Start: 2022-09-29 | End: 2023-09-24

## 2023-07-27 RX ORDER — RABEPRAZOLE SODIUM 20 MG/1
1 TABLET TABLET, DELAYED RELEASE ORAL ONCE A DAY
Qty: 90 TABLET | Refills: 3 | Status: ACTIVE | COMMUNITY
Start: 2023-06-09

## 2023-08-08 ENCOUNTER — TELEPHONE ENCOUNTER (OUTPATIENT)
Dept: URBAN - METROPOLITAN AREA CLINIC 98 | Facility: CLINIC | Age: 58
End: 2023-08-08

## 2023-08-08 RX ORDER — DIPHENOXYLATE HYDROCHLORIDE AND ATROPINE SULFATE 2.5; .025 MG/1; MG/1
TAKE 2 TABLETS TABLET ORAL TWICE DAILY
Qty: 120 TABLETS | Refills: 5 | OUTPATIENT
Start: 2023-08-08 | End: 2024-02-03

## 2023-08-09 ENCOUNTER — TELEPHONE ENCOUNTER (OUTPATIENT)
Dept: URBAN - METROPOLITAN AREA CLINIC 98 | Facility: CLINIC | Age: 58
End: 2023-08-09

## 2023-08-09 ENCOUNTER — LAB OUTSIDE AN ENCOUNTER (OUTPATIENT)
Dept: URBAN - METROPOLITAN AREA CLINIC 98 | Facility: CLINIC | Age: 58
End: 2023-08-09

## 2023-08-09 ENCOUNTER — P2P PATIENT RECORD (OUTPATIENT)
Age: 58
End: 2023-08-09

## 2023-08-14 ENCOUNTER — P2P PATIENT RECORD (OUTPATIENT)
Age: 58
End: 2023-08-14

## 2023-08-14 ENCOUNTER — OFFICE VISIT (OUTPATIENT)
Dept: URBAN - METROPOLITAN AREA TELEHEALTH 2 | Facility: TELEHEALTH | Age: 58
End: 2023-08-14
Payer: COMMERCIAL

## 2023-08-14 VITALS — WEIGHT: 130 LBS | BODY MASS INDEX: 19.26 KG/M2 | HEIGHT: 69 IN

## 2023-08-14 DIAGNOSIS — E46 MALNUTRITION, UNSPECIFIED TYPE: ICD-10-CM

## 2023-08-14 DIAGNOSIS — K50.80 CROHN'S COLITIS: ICD-10-CM

## 2023-08-14 PROCEDURE — 97802 MEDICAL NUTRITION INDIV IN: CPT | Performed by: DIETITIAN, REGISTERED

## 2023-08-14 RX ORDER — VEDOLIZUMAB 300 MG/5ML
INFUSE 300 MG OVER 30 MINUTE(S) BY INTRAVENOUS ROUTE EVERY 4 WEEKS INJECTION, POWDER, LYOPHILIZED, FOR SOLUTION INTRAVENOUS
Qty: 1 | Refills: 11 | Status: ACTIVE | COMMUNITY
Start: 1900-01-01

## 2023-08-14 RX ORDER — IBUPROFEN 200 MG/1
TAKE 1 CAPSULE (200 MG) BY ORAL ROUTE EVERY 6 HOURS AS NEEDED CAPSULE, LIQUID FILLED ORAL
Qty: 0 | Refills: 0 | Status: ACTIVE | COMMUNITY
Start: 1900-01-01

## 2023-08-14 RX ORDER — DICYCLOMINE HYDROCHLORIDE 20 MG/1
TAKE TWO TABLETS BY MOUTH FOUR TIMES A DAY TABLET ORAL
Qty: 240 TABLET | Refills: 11 | Status: ACTIVE | COMMUNITY

## 2023-08-14 RX ORDER — PREDNISONE 10 MG/1
TAKE 40 MG DAILY TABLET ORAL ONCE A DAY
Status: ACTIVE | COMMUNITY

## 2023-08-14 RX ORDER — IBUPROFEN 100 MG/1
TAKE 2 TABLETS (200 MG) BY ORAL ROUTE EVERY 6 HOURS AS NEEDED WITH FOOD TABLET, COATED ORAL
Qty: 0 | Refills: 0 | Status: ACTIVE | COMMUNITY
Start: 1900-01-01

## 2023-08-14 RX ORDER — FOLIC ACID 5 MG
1 CAPSULE CAPSULE ORAL ONCE A DAY
Qty: 90 CAPSULE | Refills: 3 | Status: ACTIVE | COMMUNITY
Start: 2022-10-03 | End: 2023-09-28

## 2023-08-14 RX ORDER — LETROZOLE TABLETS 2.5 MG/1
TAKE 1 TABLET (2.5 MG) BY ORAL ROUTE ONCE DAILY TABLET, FILM COATED ORAL 1
Qty: 0 | Refills: 0 | Status: ACTIVE | COMMUNITY
Start: 1900-01-01

## 2023-08-14 RX ORDER — RISANKIZUMAB-RZAA 360 MG/2.4
360 MG WEARABLE INJECTOR SUBCUTANEOUS
Qty: 1 KIT | Refills: 8 | Status: ACTIVE | COMMUNITY
Start: 2022-10-19 | End: 2024-03-06

## 2023-08-14 RX ORDER — BUDESONIDE 9 MG/1
1 TABLET IN THE MORNING TABLET, FILM COATED, EXTENDED RELEASE ORAL ONCE A DAY
Qty: 30 TABLETS | Refills: 11 | Status: ACTIVE | COMMUNITY
Start: 2022-06-15 | End: 2023-09-18

## 2023-08-14 RX ORDER — RABEPRAZOLE SODIUM 20 MG/1
1 TABLET TABLET, DELAYED RELEASE ORAL ONCE A DAY
Qty: 90 TABLET | Refills: 3 | Status: ACTIVE | COMMUNITY
Start: 2023-06-09

## 2023-08-14 RX ORDER — PROMETHAZINE HYDROCHLORIDE 6.25 MG/5ML
TAKE 5 ML BY MOUTH AT BEDTIME SOLUTION ORAL
Qty: 150 ML | Refills: 11 | Status: ACTIVE | COMMUNITY

## 2023-08-14 RX ORDER — RABEPRAZOLE SODIUM 20 MG/1
TAKE 1 TABLET BY MOUTH TWICE DAILY TABLET, DELAYED RELEASE ORAL
Qty: 180 | Refills: 3 | Status: ACTIVE | COMMUNITY

## 2023-08-14 RX ORDER — VITAMIN A 2400 MCG
1 TABLET CAPSULE ORAL ONCE A DAY
Qty: 90 TABLET | Refills: 1 | Status: ACTIVE | COMMUNITY
Start: 2022-01-12

## 2023-08-14 RX ORDER — PROMETHAZINE HYDROCHLORIDE 25 MG/1
TAKE 1 TABLETS TABLET ORAL BID
Qty: 60 TABLETS | Refills: 5 | Status: ACTIVE | COMMUNITY
Start: 2021-02-12

## 2023-08-14 RX ORDER — ERGOCALCIFEROL CAPSULES, 1.25 MG/1
1 CAPSULE CAPSULE ORAL
Qty: 12 CAPSULES | Refills: 3 | Status: ACTIVE | COMMUNITY
Start: 2023-04-05 | End: 2024-03-30

## 2023-08-14 RX ORDER — ATOGEPANT 10 MG/1
1 TABLET TABLET ORAL ONCE A DAY
Status: ACTIVE | COMMUNITY

## 2023-08-14 RX ORDER — RABEPRAZOLE SODIUM 20 MG/1
TAKE 1 TABLET TABLET, DELAYED RELEASE ORAL TWICE DAILY
Qty: 180 TABLETS | Refills: 6 | Status: ACTIVE | COMMUNITY
Start: 2019-02-17

## 2023-08-14 RX ORDER — DIPHENOXYLATE HYDROCHLORIDE AND ATROPINE SULFATE 2.5; .025 MG/1; MG/1
TAKE 2 TABLETS TABLET ORAL TWICE DAILY
Qty: 120 TABLETS | Refills: 5 | Status: ACTIVE | COMMUNITY
Start: 2023-08-08 | End: 2024-02-03

## 2023-08-14 RX ORDER — LIDOCAINE HYDROCHLORIDE 20 MG/ML
SWISH AND SPIT 15ML BY MOUTH THREE TIMES A DAY SOLUTION ORAL; TOPICAL
Qty: 800 MILLILITER | Refills: 2 | Status: ACTIVE | COMMUNITY

## 2023-08-14 RX ORDER — METHOTREXATE 25 MG/ML
INJECT 1 ML INTO THE MUSCLE EVERY 7 DAYS INJECTION, SOLUTION INTRA-ARTERIAL; INTRAMUSCULAR; INTRATHECAL; INTRAVENOUS
Qty: 4 VIALS | Refills: 6 | Status: ACTIVE | COMMUNITY

## 2023-08-14 RX ORDER — ONDANSETRON HYDROCHLORIDE 8 MG/1
1 CAPSULE TABLET, FILM COATED ORAL TWICE DAILY
Qty: 180 CAPSULES | Refills: 3 | Status: ACTIVE | COMMUNITY
Start: 2022-09-29 | End: 2023-09-24

## 2023-08-14 RX ORDER — OMEPRAZOLE 40 MG/1
1 CAPSULE 30 MINUTES BEFORE MORNING MEAL CAPSULE, DELAYED RELEASE ORAL ONCE A DAY
Qty: 90 CAPSULES | Refills: 3 | Status: ACTIVE | COMMUNITY
Start: 2023-04-05

## 2023-08-14 RX ORDER — ALENDRONATE SODIUM 70 MG
TABLET ORAL
Qty: 0 | Refills: 0 | Status: ACTIVE | COMMUNITY
Start: 1900-01-01

## 2023-08-31 ENCOUNTER — P2P PATIENT RECORD (OUTPATIENT)
Age: 58
End: 2023-08-31

## 2023-09-04 ENCOUNTER — OUT OF OFFICE VISIT (OUTPATIENT)
Dept: URBAN - METROPOLITAN AREA MEDICAL CENTER 39 | Facility: MEDICAL CENTER | Age: 58
End: 2023-09-04
Payer: COMMERCIAL

## 2023-09-04 DIAGNOSIS — I95.9 ACUTE HYPOTENSION: ICD-10-CM

## 2023-09-04 DIAGNOSIS — R19.7 ACUTE DIARRHEA: ICD-10-CM

## 2023-09-04 DIAGNOSIS — K50.818 CROHN'S DISEASE OF BOTH SMALL AND LARGE INTESTINE WITH OTHER COMPLICATION: ICD-10-CM

## 2023-09-04 DIAGNOSIS — D64.89 ANEMIA DUE TO OTHER CAUSE: ICD-10-CM

## 2023-09-04 PROCEDURE — 99233 SBSQ HOSP IP/OBS HIGH 50: CPT | Performed by: INTERNAL MEDICINE

## 2023-09-04 PROCEDURE — G8427 DOCREV CUR MEDS BY ELIG CLIN: HCPCS | Performed by: INTERNAL MEDICINE

## 2023-09-04 PROCEDURE — 99223 1ST HOSP IP/OBS HIGH 75: CPT | Performed by: INTERNAL MEDICINE

## 2023-09-09 ENCOUNTER — P2P PATIENT RECORD (OUTPATIENT)
Age: 58
End: 2023-09-09

## 2023-09-11 ENCOUNTER — TELEPHONE ENCOUNTER (OUTPATIENT)
Dept: URBAN - METROPOLITAN AREA CLINIC 98 | Facility: CLINIC | Age: 58
End: 2023-09-11

## 2023-09-11 RX ORDER — DIPHENOXYLATE HYDROCHLORIDE AND ATROPINE SULFATE 2.5; .025 MG/1; MG/1
TAKE 2 TABLETS TABLET ORAL TWICE DAILY
Qty: 120 TABLETS | Refills: 5
Start: 2023-08-08 | End: 2024-03-09

## 2023-09-11 RX ORDER — RABEPRAZOLE SODIUM 20 MG/1
TAKE 1 TABLET BY MOUTH TWICE DAILY TABLET, DELAYED RELEASE ORAL
Qty: 180 | Refills: 3

## 2023-09-12 ENCOUNTER — TELEPHONE ENCOUNTER (OUTPATIENT)
Dept: URBAN - METROPOLITAN AREA CLINIC 98 | Facility: CLINIC | Age: 58
End: 2023-09-12

## 2023-09-13 ENCOUNTER — TELEPHONE ENCOUNTER (OUTPATIENT)
Dept: URBAN - METROPOLITAN AREA CLINIC 98 | Facility: CLINIC | Age: 58
End: 2023-09-13

## 2023-09-17 PROBLEM — 190855004: Status: ACTIVE | Noted: 2023-09-17

## 2023-09-17 PROBLEM — 302111002: Status: ACTIVE | Noted: 2023-09-17

## 2023-09-18 ENCOUNTER — OFFICE VISIT (OUTPATIENT)
Dept: URBAN - METROPOLITAN AREA CLINIC 98 | Facility: CLINIC | Age: 58
End: 2023-09-18
Payer: COMMERCIAL

## 2023-09-18 VITALS
BODY MASS INDEX: 17.48 KG/M2 | SYSTOLIC BLOOD PRESSURE: 88 MMHG | WEIGHT: 118 LBS | HEIGHT: 69 IN | DIASTOLIC BLOOD PRESSURE: 64 MMHG | TEMPERATURE: 97.3 F | HEART RATE: 91 BPM

## 2023-09-18 DIAGNOSIS — E83.42 HYPOMAGNESEMIA: ICD-10-CM

## 2023-09-18 DIAGNOSIS — Z79.899 IMMUNOSUPPRESSION DUE TO DRUG THERAPY: ICD-10-CM

## 2023-09-18 DIAGNOSIS — K50.80 CROHN'S DISEASE OF BOTH SMALL AND LARGE INTESTINE: ICD-10-CM

## 2023-09-18 DIAGNOSIS — R19.7 DIARRHEA: ICD-10-CM

## 2023-09-18 PROCEDURE — 99215 OFFICE O/P EST HI 40 MIN: CPT | Performed by: INTERNAL MEDICINE

## 2023-09-18 RX ORDER — RABEPRAZOLE SODIUM 20 MG/1
TAKE 1 TABLET TABLET, DELAYED RELEASE ORAL TWICE DAILY
Qty: 180 TABLETS | Refills: 6 | Status: ACTIVE | COMMUNITY
Start: 2019-02-17

## 2023-09-18 RX ORDER — DICYCLOMINE HYDROCHLORIDE 20 MG/1
TAKE TWO TABLETS BY MOUTH FOUR TIMES A DAY TABLET ORAL
Qty: 240 TABLET | Refills: 11 | Status: ACTIVE | COMMUNITY

## 2023-09-18 RX ORDER — BUDESONIDE 9 MG/1
1 TABLET IN THE MORNING TABLET, FILM COATED, EXTENDED RELEASE ORAL ONCE A DAY
Qty: 30 TABLETS | Refills: 11 | Status: ACTIVE | COMMUNITY
Start: 2022-06-15 | End: 2023-09-18

## 2023-09-18 RX ORDER — OMEPRAZOLE 40 MG/1
1 CAPSULE 30 MINUTES BEFORE MORNING MEAL CAPSULE, DELAYED RELEASE ORAL ONCE A DAY
Qty: 90 CAPSULES | Refills: 3 | Status: ACTIVE | COMMUNITY
Start: 2023-04-05

## 2023-09-18 RX ORDER — FOLIC ACID 5 MG
1 CAPSULE CAPSULE ORAL ONCE A DAY
Qty: 90 CAPSULE | Refills: 3 | Status: ACTIVE | COMMUNITY
Start: 2022-10-03 | End: 2023-09-28

## 2023-09-18 RX ORDER — VEDOLIZUMAB 300 MG/5ML
INFUSE 300 MG OVER 30 MINUTE(S) BY INTRAVENOUS ROUTE EVERY 4 WEEKS INJECTION, POWDER, LYOPHILIZED, FOR SOLUTION INTRAVENOUS
Qty: 1 | Refills: 11 | Status: ACTIVE | COMMUNITY
Start: 1900-01-01

## 2023-09-18 RX ORDER — PROMETHAZINE HYDROCHLORIDE 6.25 MG/5ML
TAKE 5 ML BY MOUTH AT BEDTIME SOLUTION ORAL
Qty: 150 ML | Refills: 11 | Status: ACTIVE | COMMUNITY

## 2023-09-18 RX ORDER — ATOGEPANT 10 MG/1
1 TABLET TABLET ORAL ONCE A DAY
Status: ACTIVE | COMMUNITY

## 2023-09-18 RX ORDER — RABEPRAZOLE SODIUM 20 MG/1
1 TABLET TABLET, DELAYED RELEASE ORAL ONCE A DAY
Qty: 90 TABLET | Refills: 3 | Status: ACTIVE | COMMUNITY
Start: 2023-06-09

## 2023-09-18 RX ORDER — RABEPRAZOLE SODIUM 20 MG/1
TAKE 1 TABLET BY MOUTH TWICE DAILY TABLET, DELAYED RELEASE ORAL
Qty: 180 | Refills: 3 | Status: ACTIVE | COMMUNITY

## 2023-09-18 RX ORDER — ERGOCALCIFEROL CAPSULES, 1.25 MG/1
1 CAPSULE CAPSULE ORAL
Qty: 12 CAPSULES | Refills: 3 | Status: ACTIVE | COMMUNITY
Start: 2023-04-05 | End: 2024-03-30

## 2023-09-18 RX ORDER — LETROZOLE TABLETS 2.5 MG/1
TAKE 1 TABLET (2.5 MG) BY ORAL ROUTE ONCE DAILY TABLET, FILM COATED ORAL 1
Qty: 0 | Refills: 0 | Status: ACTIVE | COMMUNITY
Start: 1900-01-01

## 2023-09-18 RX ORDER — VITAMIN A 2400 MCG
1 TABLET CAPSULE ORAL ONCE A DAY
Qty: 90 TABLET | Refills: 1 | Status: ACTIVE | COMMUNITY
Start: 2022-01-12

## 2023-09-18 RX ORDER — ALENDRONATE SODIUM 70 MG
TABLET ORAL
Qty: 0 | Refills: 0 | Status: ACTIVE | COMMUNITY
Start: 1900-01-01

## 2023-09-18 RX ORDER — IBUPROFEN 200 MG/1
TAKE 1 CAPSULE (200 MG) BY ORAL ROUTE EVERY 6 HOURS AS NEEDED CAPSULE, LIQUID FILLED ORAL
Qty: 0 | Refills: 0 | Status: ACTIVE | COMMUNITY
Start: 1900-01-01

## 2023-09-18 RX ORDER — DIPHENOXYLATE HYDROCHLORIDE AND ATROPINE SULFATE 2.5; .025 MG/1; MG/1
TAKE 2 TABLETS TABLET ORAL TWICE DAILY
Qty: 120 TABLETS | Refills: 5 | Status: ACTIVE | COMMUNITY
Start: 2023-08-08 | End: 2024-03-09

## 2023-09-18 RX ORDER — LIDOCAINE HYDROCHLORIDE 20 MG/ML
SWISH AND SPIT 15ML BY MOUTH THREE TIMES A DAY SOLUTION ORAL; TOPICAL
Qty: 800 MILLILITER | Refills: 2 | Status: ACTIVE | COMMUNITY

## 2023-09-18 RX ORDER — PROMETHAZINE HYDROCHLORIDE 25 MG/1
TAKE 1 TABLETS TABLET ORAL BID
Qty: 60 TABLETS | Refills: 5 | Status: ACTIVE | COMMUNITY
Start: 2021-02-12

## 2023-09-18 RX ORDER — PREDNISONE 10 MG/1
TAKE 40 MG DAILY TABLET ORAL ONCE A DAY
Status: ACTIVE | COMMUNITY

## 2023-09-18 RX ORDER — ONDANSETRON HYDROCHLORIDE 8 MG/1
1 CAPSULE TABLET, FILM COATED ORAL TWICE DAILY
Qty: 180 CAPSULES | Refills: 3 | Status: ACTIVE | COMMUNITY
Start: 2022-09-29 | End: 2023-09-24

## 2023-09-18 RX ORDER — METHOTREXATE 25 MG/ML
INJECT 1 ML INTO THE MUSCLE EVERY 7 DAYS INJECTION, SOLUTION INTRA-ARTERIAL; INTRAMUSCULAR; INTRATHECAL; INTRAVENOUS
Qty: 4 VIALS | Refills: 6 | Status: ACTIVE | COMMUNITY

## 2023-09-18 RX ORDER — RISANKIZUMAB-RZAA 360 MG/2.4
360 MG WEARABLE INJECTOR SUBCUTANEOUS
Qty: 1 KIT | Refills: 8 | Status: ACTIVE | COMMUNITY
Start: 2022-10-19 | End: 2024-03-06

## 2023-09-18 RX ORDER — IBUPROFEN 100 MG/1
TAKE 2 TABLETS (200 MG) BY ORAL ROUTE EVERY 6 HOURS AS NEEDED WITH FOOD TABLET, COATED ORAL
Qty: 0 | Refills: 0 | Status: ACTIVE | COMMUNITY
Start: 1900-01-01

## 2023-09-18 NOTE — HPI-TODAY'S VISIT:
59 yo female with Crohn's disease comes in for post hosp f/u.  Last resection was 2023 and diarrhea worse since then. 6 foot resection per Juanita.????  Hosp for a week and d/c 9 days ago.  Syx on admission. Weakness and low BP. Saw Dr. Reynoso who admitted her.  Has Passport in left arm. Infusions: TPN from ??????? Getting 1000 ml fluid a day. Getting TPN at Home is 16 hours a day  Percocet Methocarbinmol Folic acid Lomotil 3 bid Immodiuym Paxil  Gabapentin  [facility] has tincture of opium. Appt with Dr. Reynoso   She likes the skyrizi.   ================================ 7/10/23  59 yo female with h/o Breast cancer and Crohn's disease which is severe.  Doing well on Skyrizi. Diarrhea is better as result of Skyrizi and surgery. Arthritis is better.  Issues: 1) Ostomy outputs are increased.  taking more lomotil, increased lomotil from 2 to 3 per day and 3 immodium tid.  Consider tincture of opium.  Multiple checks for c diff ---- never been positive.  Eating better. Blood pressure is low.  Offered a diecitian - declines.  Will use tincture of opium 5 drop qid in lieu of lomotil.    ============================ 23  56 yo female with severe CD. Had pain and vomiting and was admitted to [facility] 3/3 and had ileostomy revision and resection of 6 feet of intestin on 3/6/23 under Dr. Talamantes's care. Hosp 2.5 weeks.  Feeling so much better. Less pain.  still on oxycodone 7.5 - GP gives it to her. Hope is to get off pain. No steroids. Off budesonide Off mtx. Just got 2nd injection of Skyrizi  23===========  56 yo female with severe Crohn's on Skyrizi and budesonide 3mg one per day, methotrexate .5 cc qow --due to mouth ulcers, decreased from 1 cc per week, .5 cc per week and then the currnet .5 cc qow lomotil 2 bid Dicyclomine 4 (20)bid ----- should consider 2 qid Aciphex 1 bid  ESR has stayed elevated but CRP normalized on 22  Called our office Friday afternoon and spoke with Dr. Vuong. Called at 3:30p Friday and then called on call at 5p. c/o waking up with distension and hardness and gassy She thought it could be a partial blockage and  so she took colace It sounded like a running faucet but has had regular output. Dr. Talamantes wants a CT stat --- to expedite. Problems: Severe Crohn's disease h/o Breast cancer High ostomy outputs abdominal pain Anorexia Weight loss - 3 lbs Immunosuppression Abdominal distension  ============================ 10/19/22  56 yo female for 2 month f/u. TH from home  Decision made to switch to Skyrizi.  Started Skyrizi in [place] about 6 weeks ago. Next appt is  - iv Dose 3 --- Likellt start 9;/15-Dose 1 Delay in getting Entyvio change to Skyrizi. She is not sure if the Skyrizi is working.   It was 4 weeks--- not excessive    between last Entyvio and first Skyrizi.  Stooling is the same as 4 weeks ago. More abd pain Has heaviness and upper abdominal pain.   ====================================================== 22  56 yo female comes in with  Guzman for 3 week follow up.  She feels her Crohn's is better. Ileostomy outputs similar. 5-7 per day.  No blood. Mouth is much better. Less pain. Rectum is in place. Mucus and stool per rectum. Blood per rectum She feels closing her up is not an answer.  Problems with back --- spinal stenosis - Guzman Barajas MD Resurgeons. Hip and Knee hurt. Has an appointment with [facility].  Not eating much. Lost 6 more lbs. Been sick for 1.5 weeks - does not know. Headache and neck ache, could not lift head. Has been exhausted and dizzy. Can't walk in a straight line.  Has primary MD.    Slade Auguste MD - [facility]. Less steady when whe walks. Very weak. Difficulty getting here for the appointment.  Not eating because not hung  Meds: Entyvio 300 mg iv q 4w Gabapentin 300 mg po tid started 3 weeks ago Lomotil Paxil Dicyclomine Folic acid Ibuprofen Trazodone  For upcoming back fusion with Dr Jenn Adkinsgic to tape, levoquin, sulfa   ===================== 22  56 yo female with severe CD on Entyvio 300 mg iv q 4 w and mtx 12.5 mg sq q week Budesonide 3 mg a day and folic acid.  Main complaints are: Mouth ulcers Abdominal pain Diarrhea- empties bad 7 times Bad day more than 10/ No blood in stool. Nausea - continuous - awakens with nausea. Eats and nausea. PPI- Aciphex 20 mg bid. Trazodone. No other meds  Next infusion next week.   ======================= 22  57 yo female comes in for urgent f/u re; need for Promethazine 6.25 mg/5ml solution as this works better for her when she has acute nausea and nausea unrelieved by ondansetron. I feel this is an appropriate need and should be approved by insurance even though it is non-formulary.  MRE is pending and plan per radiology is to use barium as volumen as she vomits with it.  She experiences nausea every day due to bowel dysfunction from active Crohns disease and other factors.  Meds: Entyvio 300 mg iv q 4 w Methotrexate 1 cc per week.  Wants to reduce to .5 cc per week and that is fine. Folic acid 1 mg a da\7 Budesonide 3 mg now, reduced from 9 m,g =============================== 22  57 yo female returns for 5 week f/u. Stoma is fully healed. No issues with the appliances. Emptying bag 5-6 times a day and coping.  She has been having abdominal pain for the past few weeks. It felt like someone was "trying to pull the uterus out" and she calls it scar tissue. There was nothing on US, to cause the pain. MRE from 21 showed ??problable low grade partial SBO but this may be dysfunction.  She came to see me for the iron deficiency anemia. She has noticed some bleeding.  She still has urgency to move her bowels. Yesterday was scary becasue she saw blood in the rectal fluid. Passes rectal fluid 8-10 times a day. At night, it can be horrendous. Bottom of diaper is full.  On Entyvio 300 mg iv q 4 Methotrexate 1 cc q week Budesonide -- 3m 3 per day.  -Was feeling bad 6 months ago or so and Bud was started. May be worth trying to taper   ============================= 22  57 yo female with CD and ileostomy comes in for 3 month f/u visit.  Finally healing from stoma revision 10/15/21.  Stoma never stuck out enough and kept   Dr. Talamantes did a stoma revision.  Mild "complication" as skin around the stoma, stoma had a "moat" around it.  Empties bag 5-6 times. Interval check. Eating ok. Mouth is healing from the leucovorin to lessen the effect of mtx on mouth.  Getting guidance from Myerson.  ===================================== 10/6/21  57 yo female with Crohn's and avascular necrosis  and saw Dr. Myerson 2 days ago and he did blood work has some plans - f/u in 3 weeks.  Sees Dr. Gardiner tomorrow to address major ostomy issues.  Considering ostomy revision.  Retracted stoma.  Saw her mother and needed more supplies due to leaking.  Entocort now for 10 weeks, 6 mg a day, empties ostomy 6-9 times.  Has leaked but not in recent weeks.  Never on 9 mg and capri 6 mg for 10 weeks.  Try alternating 6 mg and 3 mg on alternate days.    ========================  21  57 yo female with CD now for 1 month f/u after telehealth and doing better 5 weeks into course of Entocort tx.  Does havd avascular necrosis of hips and understands there are risks of similar complications with Entocort therapy.  Was on fosamax. I just called Dr. Myerson to arrange an appoinment.  Ostomy - empties it 6 times so far today. Occasional abdominal pain.  On Entyvio every 4 weeks Entocort 9 mg a day x 5 weeks.  Asked to have CD4/CD8 checked, by sister's immunologist.   ===================== 8/3/21  57 yo female for  f/u due to beeing weak and in bed. In ER last week and they sent her home.  Meds: Entyvio 300 mg q 4 weeks Entocort  x 1 week Off prednisone - had been on prednisone- she does not think it is what was helping her. On 1 cc mtx per week.  Devin Auguste is primary MD Saw him a month ago.  Main syx now.5 days ago was throwing up and was so distended. Could not poke her stomach. went to the ER Wednesday. N/V. Aciphex 1 twice daily. Last looked  Never had an ulcer. Ileostomy in place.  Fever on Friday, up to 101. Eating now. Hungry yesterday  Not vomiting.  May neede EGD.     ====================== 21  54 yo female comes in for f/u.  She is "off" per Dr. Jaquez.  She did an MRI. Dr. Jaquez reportedly thinks it is her meds. Has not seen a neurologist in years. Recent  labs were nl mostly with Hb 10.1, nl K-4.0 and Ca. Nl ESR and CRP, but had just finished medrol dose eliza.  Wants a Mg level. Labs to be sent to [facility].  Nl quantiferon.  Currently , getting Entyvio every 4 weeks.  Getting entyvio every 4 weeks. Taking 2 weeks off mtx due to stomatitis. If resuming, would reduce from 1 cc to .5 cc..  She is not sure how much she needs mtx and how much it has helped in the past. Mtx has very likely caused the stomatis. Or the pred medrol could have helped the mouth and belly. She has avn all over.  Myerson will see her and order bone scan  Addendum ------21 Has thicker stools Has to change her bag because of high outputs She says her stoma is an outie  She is on pred 20 mg and should taper off it by going to 10 mg to 5 mg for a week and then d/c Risks of prednisone discussed.  Had dropped mtx from 25 to 12.5 and will go back up to 25 mg a week. Consider [facility] referral if not improvimg at that point. ============ 21  54 yo female with CD and ostomy for 5 month f/u.  Abd is good. Ostomy is functioning. Sleep thru much of night.  Appetite and intake is better. Oral CD problematic. Has been in touch with Dr. Pepe ---- swish and spit.  Max-- 150. Current weight is 121.  Not taking supplements --  Boost and Ensure. 2-3 cans The y don't agree with her.. R Lactose free ice  She wants low dose prednisone. Lets try 2.5-5 mg a day.  ================== 20  54 yo female for 3 week TH f/u. Has upper abdominal pain that may be due to gallstones. Had referred her to Dr. Moreland but she did not call. She heard he was retiring and not. Has abdominal pain when she. Left upper quadrant or "upper rib cage" mostly left. He knows Dr. Talamantes. -  Frustrated---- blisters on lip. Torn open. Has not tried steroid rinse or paste in past. Magic  mouth wash helps. Does not reduce the blisters. Dr. Nasir- non steroid options for mouth   ======================== 10/23/20  10/23 54 yo female for 3 week TH f/u. Has oral Crohn's and I-C and stoma issues. Entyvio may not cover the EIM. Might try oral steroid lozenge --- pred paste and lozenges.  Entyvio 300 q 4 weeks Mtx 1 cc q week Pred 5 mg may stop. Bentyl -  Abd pain is ok No idea what triggered her admission Food induced -   Consider medrol dose eliza in the future  Extensive lab review CBC --- Hct 31.6/Hb 10.9 plt 120 CRP 22 ESR 56 Iron 33% Ferrintin good ================================================== 10/2/20  54 yo female with  Crohn's disease recently hosp 3 days at  for abd pain, N/V and got better with 3 dasy 3 days of iv steroids.  Surgery considered.  Was on 40 mg pred - intolerant anxiety  Now dropped to 30 mg a day. Now is 50-60% better than admission.  Surgery debated gallstones. They leaned toward blockage.  Has been on Stelara. Entyvio works better than Stelara. Mtx makes her tired She would like to up the dose of lomotil.  20  55 yo female with Crohnn's disease having continued active syx. Getting Entyvio every 4 weeks. Methotrexate 12.5 mg per week Dicyclomine tablets 2 po qid  == She has taken Xifaxan and it tears her up.   Then stop it. Does not like flagyl and does not help.  Pepto 1-2 doses a day, does help the diarreha. She uses Immodium every day.  Stooling ----- empties bag 6-10 times a day. Abdominal pain - when she eats and intermittent. Energy - gone.  Labs- Anemia - Hb lower CRP is higher ---34 (was 16).   ========================== 20  Crohn's disease.     55 yo female with Crohn's disease comes in for f/u.  Dr. Jaquez is considering a GYN etiology.  Needs a pouch exam.  Getting Entyvio every 4 weeks.  Stelara did not work as well as Entyvio.  Normally when she gets an infusion, she gets a bounce and has not.  Prom[employer] Vedo level 3/25, just before 4 week dose was great_ __ _32.5 and no antibodies.  Feb 10 labs looked good CRP 29.9 and cbc looked fine.  ESR elevation.  2 liters of miralax_ __ _ Try 6-8am _ __ _- and then an exam at 2-3 pm She asked about a pilonidal cyst and I directed to Dr. Talamantes  Xifaxan  Try pepto bismol 1-2 prn on days.    ===== 20  55 yo female comes in at my request for 3 week f/u of Crohn's due to new 4 x eleation of CRP at 29.9.  19 CRP was similarly elevated 4x at 2.1/.5  Leonid is checking CT abd-pelvis due to pelvic pain which is not characteristic of her Crohn's.  She is wondering about GYN.  Or spastic bladder.  Dr. Talamantes thought stable but wants to scope to be sure.  Empties bag 10x a day.That is more than 3-6 months ago and has leakage of the ostomy bag. Maybe more volume.  Very watery.  Review of labs from 2/10 show cRP 29.i9 but others are stable. CBC looks good with trace elevation of wbc and borderline anemia. Getting iv iron from Arianna Jaquez. Vit D is normal at 39. Albumin is normal at 4.5.  MRI 10/1/19 showed gallstones , present in past, and she has mild pain that is intermittent Liver tests are nl. Can assess with HIDA, and/or with surgical consult, e.g ???relative need for choly, high vs. low.    ================ 20  55 yo female with severe Crohn's disease and h/o breast cancer _ _ Arianna Jaquez MD on Letrasol. 10th year.  Abdominal pain is confusing.  Has gotten pain in the ovary and in the uterus. GYN-Ann Laird MD and did US was fine. Sent to PT for pelvic treatment.  At her comment, I reviewed [facility] imaging 16 nl CTE 5/3/17 nl GB US Oct 2 2019 "Cholelithiasis"   o/w MRE was nl - no inflammation. BUT THE MRI DID SHOW "PERSISTENT TETHERING OF UTERUS TO RECTAL STUMP"  On Entyvio 300 mg q 4 weeks.  Next dose in 2 weeks On mtx _ _.5 cc. Monthly b12 shots  Eating ok, some weight loss. Weight stable x 6 months and down 30 lb since _ __ _- she is comfortable with current weight.  ============= 19  55 yo female with severe Crohn's and h/o breast cancer comes in for 3 month f/u.  Doing poorly.  Daily abd pain which is intermittent.  When she eats she gets pain and nausea.  Taking zofran and phenergan.  Sleep pattern is awry.  Empties bag 5 times a day and going out of rectum.  Has a loop ileostomy that may be problematic.  Allergic to tape.  Currently on Entyvio 300 mg q 8 weeks.  Stelara did not work as well.  Discussed the entyvio at shorter cycles of q 4 weeks to see if that will control syx better.  Outputs are higher and she may be volume depleted and maybe more entyvio will help.  last entyvio was last week and so a level won't be useful today.  Will just proceed with dose increase.    ============== 18 51 yo female with Crohn's disease and breast cancer and diverting ileostomy from Dr. Talamantes.  Skin issue - addressed by Dr. Best.  See Brooke Talamantes MD and enterostomal therapist.  AGWS.  No fcs.      Review of Systems  ConstitutionalDenies : body aches, fever, weight gain, weight loss  CardiovascularDenies : chest pain, heart racing/skipping, high blood pressure  RespiratoryDenies : chronic cough, shortness of breath, wheezing or asthma symptoms  GastrointestinalDenies : Abdominal Pain/discomfort, Anal/Rectal Pain or Itching, Anal Spasm, Black Stool, Bloating/belching/gaseouness, Change of Bowel Habit, Constipation, Diarrhea/Loose Stool, Difficulty in Swallowing, Heartburn/esophageal reflux, Hemorrhoids, Indigestion, Mucus in Stool, Nausea/vomiting, Rectal Bleeding, Unintentional Weight Loss    Vitals  Date Time BP Position Site L\R Cuff Size HR RR TEMP (F) WT  HT  BMI kg/m2 BSA m2 O2 Sat HC     2020 05:15 PM         133lbs 0oz 5'  8.5" 19.93 1.71          Assessment  Crohn's Disease     555.2  Diarrhea     787.91/R19.7  Cholelithiasis     574.20/K80.20  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 () - - 2020  EGD w/Biopsy if indicated (68177) - - 2020  Pouchoscopy (31503) - - 2020  CMP (comprehensive metabolic panel) (67594) - - 2020  Sed rate (88087) - - 2020  C-reactive protein (41553) - - 2020  Ferritin assay (47657) - - 2020  Iron + iron binding capacity panel ser/plas (00140, 09287) - - 2020  Vitamin B12 and folate measurement (13798, 10680) - - 2020  CBC with diff (00527) - - 2020  25-OH-Vitamin D (02946) - - 2020  GPP 22 (FilmArray) - Gastrointestinal Pathogen Panel - QDx (84957) - - 2020  MedicationsMedications have been Reconciled  Transition of Care or Provider Policy  InstructionsPatient seen today via telehealth by agreement and consent of patient in light of current COVID-19 pandemic. I used video conferencing during the visit. The patient encounter is appropriate and reasonable under the circumstances given the patient's particular presentation at this time. The patient has been advised of the followin) the potential risks and limitations of this mode of treatment (including but not limited to the absence of in-person examination); 2) the right to refuse telehealth services at any point without affecting the right to future care; 3) the right to receive in-person services, included immediately after this consultation if an urgent need arises; 4) information, including identifiable images or information from this telehealth consult, will only be shared in accordance with HIPPA regulations. Any and all of the patient's and/or patient's family member's questions on this issue have been answered. The patient has verbally consented to be treated via telehealth services. The patient has also been advised to contact this office for worsening conditions or problems, and seek emergency medical treatment and/or call 911 if the patient deems either necessary.  (For commercial payers, telehealth video only) More than half of the face-to-face time used for counseling and coordination of care.  40 min appointment (88274 EP)  I have documented a list of current medications and reviewed it with the patient.  I encouraged the patient to diet and exercise.  DispositionReturn To Clinic in 2 Months  CorrespondenceCC this document (Slade Auguste MD, Brooke Talamantes MD, Arianna Jaquez MD) - 2020    14260 exam med 32 min time  needs refill or lomotil Can't eRx lomotil.  Aciphex refill.  Needs EGD and pouchoscopy         Electronically Signed by: Sánchez Perez MD -A  cc: note to Dr. Brooke Talamantes 23

## 2023-09-26 ENCOUNTER — P2P PATIENT RECORD (OUTPATIENT)
Age: 58
End: 2023-09-26

## 2023-09-29 ENCOUNTER — ERX REFILL RESPONSE (OUTPATIENT)
Dept: URBAN - METROPOLITAN AREA CLINIC 98 | Facility: CLINIC | Age: 58
End: 2023-09-29

## 2023-09-29 RX ORDER — PROMETHAZINE HYDROCHLORIDE 6.25 MG/5ML
TAKE 5 ML BY MOUTH AT BEDTIME SOLUTION ORAL
Qty: 150 ML | Refills: 11 | OUTPATIENT

## 2023-09-30 LAB
A/G RATIO: 1.9
ALBUMIN: 4.1
ALKALINE PHOSPHATASE: 72
ALT (SGPT): 6
AST (SGOT): 11
BASO (ABSOLUTE): 0
BASOS: 0
BILIRUBIN, TOTAL: <0.2
BUN/CREATININE RATIO: 36
BUN: 31
C-REACTIVE PROTEIN, QUANT: 6
CALCIUM: 9.2
CARBON DIOXIDE, TOTAL: 15
CHLORIDE: 110
CREATININE: 0.87
EGFR: 78
EOS (ABSOLUTE): 0.2
EOS: 4
FERRITIN, SERUM: 1367
FOLATE (FOLIC ACID), SERUM: 12.4
GLOBULIN, TOTAL: 2.2
GLUCOSE: 106
HEMATOCRIT: 25.8
HEMATOLOGY COMMENTS:: (no result)
HEMOGLOBIN: 8.6
IMMATURE CELLS: (no result)
IMMATURE GRANS (ABS): 0
IMMATURE GRANULOCYTES: 0
IRON BIND.CAP.(TIBC): 174
IRON SATURATION: 29
IRON: 50
LYMPHS (ABSOLUTE): 1.3
LYMPHS: 25
MAGNESIUM: 2.2
MCH: 35.2
MCHC: 33.3
MCV: 106
MONOCYTES(ABSOLUTE): 0.4
MONOCYTES: 8
NEUTROPHILS (ABSOLUTE): 3.2
NEUTROPHILS: 63
NRBC: (no result)
PLATELETS: 179
POTASSIUM: 4
PROTEIN, TOTAL: 6.3
RBC: 2.44
RDW: 11.8
SEDIMENTATION RATE-WESTERGREN: 34
SODIUM: 142
UIBC: 124
VITAMIN B12: 590
VITAMIN D, 25-HYDROXY: 34.7
WBC: 5.2

## 2023-10-02 ENCOUNTER — LAB OUTSIDE AN ENCOUNTER (OUTPATIENT)
Dept: URBAN - METROPOLITAN AREA CLINIC 98 | Facility: CLINIC | Age: 58
End: 2023-10-02

## 2023-10-04 ENCOUNTER — TELEPHONE ENCOUNTER (OUTPATIENT)
Dept: URBAN - METROPOLITAN AREA CLINIC 98 | Facility: CLINIC | Age: 58
End: 2023-10-04

## 2023-10-06 ENCOUNTER — TELEPHONE ENCOUNTER (OUTPATIENT)
Dept: URBAN - METROPOLITAN AREA CLINIC 98 | Facility: CLINIC | Age: 58
End: 2023-10-06

## 2023-10-07 ENCOUNTER — TELEPHONE ENCOUNTER (OUTPATIENT)
Dept: URBAN - METROPOLITAN AREA CLINIC 98 | Facility: CLINIC | Age: 58
End: 2023-10-07

## 2023-10-08 LAB
C DIFFICILE TOXIN GENE NAA: NEGATIVE
C DIFFICILE TOXINS A+B, EIA: NEGATIVE
CALPROTECTIN, FECAL: 43
CAMPYLOBACTER CULTURE: (no result)
E COLI SHIGA TOXIN EIA: NEGATIVE
LACTOFERRIN, FECAL, QUANT.: 5.26
Lab: (no result)
OVA + PARASITE EXAM: (no result)
SALMONELLA/SHIGELLA SCREEN: (no result)
STOOL CULTURE, YERSINIA ONLY: (no result)

## 2023-10-16 ENCOUNTER — OFFICE VISIT (OUTPATIENT)
Dept: URBAN - METROPOLITAN AREA CLINIC 98 | Facility: CLINIC | Age: 58
End: 2023-10-16

## 2023-10-21 LAB
A/G RATIO: 1.4
ALBUMIN: 4.1
ALKALINE PHOSPHATASE: 101
ALT (SGPT): 10
AST (SGOT): 10
BASO (ABSOLUTE): 0
BASOS: 0
BILIRUBIN, TOTAL: <0.2
BUN/CREATININE RATIO: 26
BUN: 23
C-REACTIVE PROTEIN, QUANT: 5
CALCIUM: 9.7
CARBON DIOXIDE, TOTAL: 19
CHLORIDE: 106
CREATININE: 0.88
EGFR: 77
EOS (ABSOLUTE): 0.3
EOS: 5
FERRITIN, SERUM: 1286
FOLATE (FOLIC ACID), SERUM: >20
GLOBULIN, TOTAL: 3
GLUCOSE: 98
HEMATOCRIT: 29
HEMATOLOGY COMMENTS:: (no result)
HEMOGLOBIN: 9.6
IMMATURE CELLS: (no result)
IMMATURE GRANS (ABS): 0
IMMATURE GRANULOCYTES: 0
IRON BIND.CAP.(TIBC): 209
IRON SATURATION: 38
IRON: 80
LYMPHS (ABSOLUTE): 1.7
LYMPHS: 32
MAGNESIUM: 1.8
MCH: 35.3
MCHC: 33.1
MCV: 107
MONOCYTES(ABSOLUTE): 0.6
MONOCYTES: 10
NEUTROPHILS (ABSOLUTE): 2.8
NEUTROPHILS: 53
NRBC: (no result)
PLATELETS: 180
POTASSIUM: 4.2
PROTEIN, TOTAL: 7.1
RBC: 2.72
RDW: 11.7
SEDIMENTATION RATE-WESTERGREN: 67
SODIUM: 141
UIBC: 129
VITAMIN B12: 516
WBC: 5.3

## 2023-10-23 ENCOUNTER — TELEPHONE ENCOUNTER (OUTPATIENT)
Dept: URBAN - METROPOLITAN AREA CLINIC 98 | Facility: CLINIC | Age: 58
End: 2023-10-23

## 2023-10-27 ENCOUNTER — TELEPHONE ENCOUNTER (OUTPATIENT)
Dept: URBAN - METROPOLITAN AREA CLINIC 98 | Facility: CLINIC | Age: 58
End: 2023-10-27

## 2023-11-03 ENCOUNTER — TELEPHONE ENCOUNTER (OUTPATIENT)
Dept: URBAN - METROPOLITAN AREA CLINIC 98 | Facility: CLINIC | Age: 58
End: 2023-11-03

## 2023-11-09 ENCOUNTER — OFFICE VISIT (OUTPATIENT)
Dept: URBAN - METROPOLITAN AREA CLINIC 98 | Facility: CLINIC | Age: 58
End: 2023-11-09
Payer: COMMERCIAL

## 2023-11-09 VITALS
BODY MASS INDEX: 18.66 KG/M2 | SYSTOLIC BLOOD PRESSURE: 86 MMHG | TEMPERATURE: 97.7 F | DIASTOLIC BLOOD PRESSURE: 52 MMHG | HEIGHT: 69 IN | WEIGHT: 126 LBS | HEART RATE: 86 BPM

## 2023-11-09 DIAGNOSIS — E83.42 HYPOMAGNESEMIA: ICD-10-CM

## 2023-11-09 DIAGNOSIS — Z93.2 ILEOSTOMY IN PLACE: ICD-10-CM

## 2023-11-09 DIAGNOSIS — K50.80 CROHN'S DISEASE OF BOTH SMALL AND LARGE INTESTINE: ICD-10-CM

## 2023-11-09 DIAGNOSIS — K50.90 CROHN DISEASE: ICD-10-CM

## 2023-11-09 PROCEDURE — 99215 OFFICE O/P EST HI 40 MIN: CPT | Performed by: INTERNAL MEDICINE

## 2023-11-09 RX ORDER — IBUPROFEN 100 MG/1
TAKE 2 TABLETS (200 MG) BY ORAL ROUTE EVERY 6 HOURS AS NEEDED WITH FOOD TABLET, COATED ORAL
Qty: 0 | Refills: 0 | Status: ACTIVE | COMMUNITY
Start: 1900-01-01

## 2023-11-09 RX ORDER — RABEPRAZOLE SODIUM 20 MG/1
1 TABLET TABLET, DELAYED RELEASE ORAL ONCE A DAY
Qty: 90 TABLET | Refills: 3 | Status: ACTIVE | COMMUNITY
Start: 2023-06-09

## 2023-11-09 RX ORDER — PROMETHAZINE HYDROCHLORIDE 6.25 MG/5ML
TAKE 5 ML BY MOUTH AT BEDTIME SOLUTION ORAL
Qty: 150 ML | Refills: 11 | Status: ACTIVE | COMMUNITY

## 2023-11-09 RX ORDER — PREDNISONE 10 MG/1
TAKE 40 MG DAILY TABLET ORAL ONCE A DAY
Status: ACTIVE | COMMUNITY

## 2023-11-09 RX ORDER — DICYCLOMINE HYDROCHLORIDE 20 MG/1
TAKE TWO TABLETS BY MOUTH FOUR TIMES A DAY TABLET ORAL
Qty: 240 TABLET | Refills: 11 | Status: ACTIVE | COMMUNITY

## 2023-11-09 RX ORDER — DIPHENOXYLATE HYDROCHLORIDE AND ATROPINE SULFATE 2.5; .025 MG/1; MG/1
TAKE 2 TABLETS TABLET ORAL TWICE DAILY
Qty: 120 TABLETS | Refills: 5 | Status: ACTIVE | COMMUNITY
Start: 2023-08-08 | End: 2024-03-09

## 2023-11-09 RX ORDER — PROMETHAZINE HYDROCHLORIDE 25 MG/1
TAKE 1 TABLETS TABLET ORAL BID
Qty: 60 TABLETS | Refills: 5 | Status: ACTIVE | COMMUNITY
Start: 2021-02-12

## 2023-11-09 RX ORDER — LETROZOLE TABLETS 2.5 MG/1
TAKE 1 TABLET (2.5 MG) BY ORAL ROUTE ONCE DAILY TABLET, FILM COATED ORAL 1
Qty: 0 | Refills: 0 | Status: ACTIVE | COMMUNITY
Start: 1900-01-01

## 2023-11-09 RX ORDER — ERGOCALCIFEROL CAPSULES, 1.25 MG/1
1 CAPSULE CAPSULE ORAL
Qty: 12 CAPSULES | Refills: 3 | Status: ACTIVE | COMMUNITY
Start: 2023-04-05 | End: 2024-03-30

## 2023-11-09 RX ORDER — LIDOCAINE HYDROCHLORIDE 20 MG/ML
SWISH AND SPIT 15ML BY MOUTH THREE TIMES A DAY SOLUTION ORAL; TOPICAL
Qty: 800 MILLILITER | Refills: 2 | Status: ACTIVE | COMMUNITY

## 2023-11-09 RX ORDER — METHOTREXATE 25 MG/ML
INJECT 1 ML INTO THE MUSCLE EVERY 7 DAYS INJECTION, SOLUTION INTRA-ARTERIAL; INTRAMUSCULAR; INTRATHECAL; INTRAVENOUS
Qty: 4 VIALS | Refills: 6 | Status: ACTIVE | COMMUNITY

## 2023-11-09 RX ORDER — RISANKIZUMAB-RZAA 360 MG/2.4
360 MG WEARABLE INJECTOR SUBCUTANEOUS
Qty: 1 KIT | Refills: 8 | Status: ACTIVE | COMMUNITY
Start: 2022-10-19 | End: 2024-03-06

## 2023-11-09 RX ORDER — RABEPRAZOLE SODIUM 20 MG/1
TAKE 1 TABLET TABLET, DELAYED RELEASE ORAL TWICE DAILY
Qty: 180 TABLETS | Refills: 6 | Status: ACTIVE | COMMUNITY
Start: 2019-02-17

## 2023-11-09 RX ORDER — ATOGEPANT 10 MG/1
1 TABLET TABLET ORAL ONCE A DAY
Status: ACTIVE | COMMUNITY

## 2023-11-09 RX ORDER — ALENDRONATE SODIUM 70 MG
TABLET ORAL
Qty: 0 | Refills: 0 | Status: ACTIVE | COMMUNITY
Start: 1900-01-01

## 2023-11-09 RX ORDER — IBUPROFEN 200 MG/1
TAKE 1 CAPSULE (200 MG) BY ORAL ROUTE EVERY 6 HOURS AS NEEDED CAPSULE, LIQUID FILLED ORAL
Qty: 0 | Refills: 0 | Status: ACTIVE | COMMUNITY
Start: 1900-01-01

## 2023-11-09 RX ORDER — VEDOLIZUMAB 300 MG/5ML
INFUSE 300 MG OVER 30 MINUTE(S) BY INTRAVENOUS ROUTE EVERY 4 WEEKS INJECTION, POWDER, LYOPHILIZED, FOR SOLUTION INTRAVENOUS
Qty: 1 | Refills: 11 | Status: ACTIVE | COMMUNITY
Start: 1900-01-01

## 2023-11-09 RX ORDER — RABEPRAZOLE SODIUM 20 MG/1
TAKE 1 TABLET BY MOUTH TWICE DAILY TABLET, DELAYED RELEASE ORAL
Qty: 180 | Refills: 3 | Status: ACTIVE | COMMUNITY

## 2023-11-09 RX ORDER — VITAMIN A 2400 MCG
1 TABLET CAPSULE ORAL ONCE A DAY
Qty: 90 TABLET | Refills: 1 | Status: ACTIVE | COMMUNITY
Start: 2022-01-12

## 2023-11-09 RX ORDER — OMEPRAZOLE 40 MG/1
1 CAPSULE 30 MINUTES BEFORE MORNING MEAL CAPSULE, DELAYED RELEASE ORAL ONCE A DAY
Qty: 90 CAPSULES | Refills: 3 | Status: ACTIVE | COMMUNITY
Start: 2023-04-05

## 2023-11-09 NOTE — HPI-TODAY'S VISIT:
57 yo female returns for 2 month f/u. She has improved greatly on TPN. She had approximately 2-3 months of TPN and home health. On Skyrizi 360 mg sq q 8w.  Empties pouch 8-12 times a day. No ER visits for 2-3 months while receiving TPN. Consultation with Dr. Taylor at [facility] is pending and currently for 2024. I was encouraged that on Skyrizi the stool biomarkers are nl.  Ab to try some meat and other foods at this point and will see how it goes.  ================================== 23  57 yo female with Crohn's disease comes in for post hosp f/u.  Last resection was 2023 and diarrhea worse since then. 6 foot resection per Juanita.????  Hosp for a week and d/c 9 days ago.  Syx on admission. Weakness and low BP. Saw Dr. Reynoso who admitted her.  Has Passport in left arm. Infusions: TPN from ??????? Getting 1000 ml fluid a day. Getting TPN at Home is 16 hours a day  Percocet Methocarbinmol Folic acid Lomotil 3 bid Immodiuym Paxil Gabapentin  [facility] has tincture of opium. Appt with Dr. Reynoso  She likes the skyrizi.   ================================ 7/10/23  57 yo female with h/o Breast cancer and Crohn's disease which is severe.  Doing well on Skyrizi. Diarrhea is better as result of Skyrizi and surgery. Arthritis is better.  Issues: 1) Ostomy outputs are increased. taking more lomotil, increased lomotil from 2 to 3 per day and 3 immodium tid.  Consider tincture of opium.  Multiple checks for c diff ---- never been positive.  Eating better. Blood pressure is low.  Offered a diecitian - declines.  Will use tincture of opium 5 drop qid in lieu of lomotil.    ============================ 23  56 yo female with severe CD. Had pain and vomiting and was admitted to [facility] 3/3 and had ileostomy revision and resection of 6 feet of intestin on 3/6/23 under Dr. Talamantes's care. Hosp 2.5 weeks.  Feeling so much better. Less pain.  still on oxycodone 7.5 - GP gives it to her. Hope is to get off pain. No steroids. Off budesonide Off mtx. Just got 2nd injection of Skyrizi  23===========  56 yo female with severe Crohn's on Skyrizi and budesonide 3mg one per day, methotrexate .5 cc qow --due to mouth ulcers, decreased from 1 cc per week, .5 cc per week and then the currnet .5 cc qow lomotil 2 bid Dicyclomine 4 (20)bid ----- should consider 2 qid Aciphex 1 bid  ESR has stayed elevated but CRP normalized on 22  Called our office Friday afternoon and spoke with Dr. Vuong. Called at 3:30p Friday and then called on call at 5p. c/o waking up with distension and hardness and gassy She thought it could be a partial blockage and so she took colace It sounded like a running faucet but has had regular output. Dr. Talamantes wants a CT stat --- to expedite. Problems: Severe Crohn's disease h/o Breast cancer High ostomy outputs abdominal pain Anorexia Weight loss - 3 lbs Immunosuppression Abdominal distension  ============================ 10/19/22  56 yo female for 2 month f/u. TH from home  Decision made to switch to Skyrizi.  Started Skyrizi in [place] about 6 weeks ago. Next appt is  - iv Dose 3 --- Likellt start 9;/15-Dose 1 Delay in getting Entyvio change to Skyrizi. She is not sure if the Skyrizi is working.   It was 4 weeks--- not excessive between last Entyvio and first Skyrizi.  Stooling is the same as 4 weeks ago. More abd pain Has heaviness and upper abdominal pain.   ====================================================== 22  56 yo female comes in with  Guzman for 3 week follow up.  She feels her Crohn's is better. Ileostomy outputs similar. 5-7 per day. No blood. Mouth is much better. Less pain. Rectum is in place. Mucus and stool per rectum. Blood per rectum She feels closing her up is not an answer.  Problems with back --- spinal stenosis - Guzman Barajas MD [facility]. Hip and Knee hurt. Has an appointment with [facility].  Not eating much. Lost 6 more lbs. Been sick for 1.5 weeks - does not know. Headache and neck ache, could not lift head. Has been exhausted and dizzy. Can't walk in a straight line.  Has primary MD. Slade Auguste MD - [place]. Less steady when whe walks. Very weak. Difficulty getting here for the appointment.  Not eating because not hung  Meds: Entyvio 300 mg iv q 4w Gabapentin 300 mg po tid started 3 weeks ago Lomotil Paxil Dicyclomine Folic acid Ibuprofen Trazodone  For upcoming back fusion with Dr Jenn Adkinsgic to tape, levoquin, sulfa   ===================== 22  56 yo female with severe CD on Entyvio 300 mg iv q 4 w and mtx 12.5 mg sq q week Budesonide 3 mg a day and folic acid.  Main complaints are: Mouth ulcers Abdominal pain Diarrhea- empties bad 7 times Bad day more than 10/ No blood in stool. Nausea - continuous - awakens with nausea. Eats and nausea. PPI- Aciphex 20 mg bid. Trazodone. No other meds  Next infusion next week.   ======================= 22  55 yo female comes in for urgent f/u re; need for Promethazine 6.25 mg/5ml solution as this works better for her when she has acute nausea and nausea unrelieved by ondansetron. I feel this is an appropriate need and should be approved by insurance even though it is non-formulary.  MRE is pending and plan per radiology is to use barium as volumen as she vomits with it.  She experiences nausea every day due to bowel dysfunction from active Crohns disease and other factors.  Meds: Entyvio 300 mg iv q 4 w Methotrexate 1 cc per week. Wants to reduce to .5 cc per week and that is fine. Folic acid 1 mg a da\7 Budesonide 3 mg now, reduced from 9 m,g =============================== 22  55 yo female returns for 5 week f/u. Stoma is fully healed. No issues with the appliances. Emptying bag 5-6 times a day and coping.  She has been having abdominal pain for the past few weeks. It felt like someone was "trying to pull the uterus out" and she calls it scar tissue. There was nothing on US, to cause the pain. MRE from 21 showed ??problable low grade partial SBO but this may be dysfunction.  She came to see me for the iron deficiency anemia. She has noticed some bleeding. She still has urgency to move her bowels. Yesterday was scary becasue she saw blood in the rectal fluid. Passes rectal fluid 8-10 times a day. At night, it can be horrendous. Bottom of diaper is full.  On Entyvio 300 mg iv q 4 Methotrexate 1 cc q week Budesonide -- 3m 3 per day. -Was feeling bad 6 months ago or so and Bud was started. May be worth trying to taper   ============================= 22  55 yo female with CD and ileostomy comes in for 3 month f/u visit.  Finally healing from stoma revision 10/15/21.  Stoma never stuck out enough and kept  Dr. Talamantes did a stoma revision. Mild "complication" as skin around the stoma, stoma had a "moat" around it.  Empties bag 5-6 times. Interval check. Eating ok. Mouth is healing from the leucovorin to lessen the effect of mtx on mouth.  Getting guidance from Myerson.  ===================================== 10/6/21  55 yo female with Crohn's and avascular necrosis and saw Dr. Myerson 2 days ago and he did blood work has some plans - f/u in 3 weeks.  Sees Dr. Gardiner tomorrow to address major ostomy issues.  Considering ostomy revision. Retracted stoma.  Saw her mother and needed more supplies due to leaking.  Entocort now for 10 weeks, 6 mg a day, empties ostomy 6-9 times.  Has leaked but not in recent weeks.  Never on 9 mg and capri 6 mg for 10 weeks.  Try alternating 6 mg and 3 mg on alternate days.    ========================  21  55 yo female with CD now for 1 month f/u after telehealth and doing better 5 weeks into course of Entocort tx.  Does havd avascular necrosis of hips and understands there are risks of similar complications with Entocort therapy.  Was on fosamax. I just called Dr. Myerson to arrange an appoinment.  Ostomy - empties it 6 times so far today. Occasional abdominal pain.  On Entyvio every 4 weeks Entocort 9 mg a day x 5 weeks.  Asked to have CD4/CD8 checked, by sister's immunologist.   ===================== 8/3/21  55 yo female for ' f/u due to beeing weak and in bed. In ER last week and they sent her home.  Meds: Entyvio 300 mg q 4 weeks Entocort x 1 week Off prednisone - had been on prednisone- she does not think it is what was helping her. On 1 cc mtx per week.  Devin Auguste is primary MD Saw him a month ago.  Main syx now.5 days ago was throwing up and was so distended. Could not poke her stomach. went to the ER Wednesday. N/V. Aciphex 1 twice daily. Last looked Never had an ulcer. Ileostomy in place.  Fever on Friday, up to 101. Eating now. Hungry yesterday  Not vomiting.  May neede EGD.     ====================== 21  54 yo female comes in for f/u.  She is "off" per Dr. Jaquez. She did an MRI. Dr. Jaquez reportedly thinks it is her meds. Has not seen a neurologist in years. Recent  labs were nl mostly with Hb 10.1, nl K-4.0 and Ca. Nl ESR and CRP, but had just finished medrol dose eliza.  Wants a Mg level. Labs to be sent to [facility]. Nl quantiferon.  Currently , getting Entyvio every 4 weeks.  Getting entyvio every 4 weeks. Taking 2 weeks off mtx due to stomatitis. If resuming, would reduce from 1 cc to .5 cc..  She is not sure how much she needs mtx and how much it has helped in the past. Mtx has very likely caused the stomatis. Or the pred medrol could have helped the mouth and belly. She has avn all over.  Myerson will see her and order bone scan  Addendum ------21 Has thicker stools Has to change her bag because of high outputs She says her stoma is an outie  She is on pred 20 mg and should taper off it by going to 10 mg to 5 mg for a week and then d/c Risks of prednisone discussed.  Had dropped mtx from 25 to 12.5 and will go back up to 25 mg a week. Consider [facility] referral if not improvimg at that point. ============ 21  54 yo female with CD and ostomy for 5 month f/u.  Abd is good. Ostomy is functioning. Sleep thru much of night.  Appetite and intake is better. Oral CD problematic. Has been in touch with Dr. Pepe ---- swish and spit.  Max-- 150. Current weight is 121.  Not taking supplements -- Boost and Ensure. 2-3 cans The y don't agree with her.. R Lactose free ice  She wants low dose prednisone. Lets try 2.5-5 mg a day.  ================== 20  54 yo female for 3 week  f/u. Has upper abdominal pain that may be due to gallstones. Had referred her to Dr. Moreland but she did not call. She heard he was retiring and not. Has abdominal pain when she. Left upper quadrant or "upper rib cage" mostly left. He knows Dr. Talamantes. - Frustrated---- blisters on lip. Torn open. Has not tried steroid rinse or paste in past. Magic mouth wash helps. Does not reduce the blisters. Dr. Best- non steroid options for mouth   ======================== 10/23/20  10/23 54 yo female for 3 week  f/u. Has oral Crohn's and I-C and stoma issues. Entyvio may not cover the EIM. Might try oral steroid lozenge --- pred paste and lozenges.  Entyvio 300 q 4 weeks Mtx 1 cc q week Pred 5 mg may stop. Bentyl -  Abd pain is ok No idea what triggered her admission Food induced -  Consider medrol dose eliza in the future  Extensive lab review CBC --- Hct 31.6/Hb 10.9 plt 120 CRP 22 ESR 56 Iron 33% Ferrintin good ================================================== 10/2/20  54 yo female with Crohn's disease recently hosp 3 days at  for abd pain, N/V and got better with 3 dasy 3 days of iv steroids.  Surgery considered.  Was on 40 mg pred - intolerant anxiety  Now dropped to 30 mg a day. Now is 50-60% better than admission.  Surgery debated gallstones. They leaned toward blockage.  Has been on Stelara. Entyvio works better than Stelara. Mtx makes her tired She would like to up the dose of lomotil.  20  55 yo female with Crohnn's disease having continued active syx. Getting Entyvio every 4 weeks. Methotrexate 12.5 mg per week Dicyclomine tablets 2 po qid  == She has taken Xifaxan and it tears her up. Then stop it. Does not like flagyl and does not help.  Pepto 1-2 doses a day, does help the diarreha. She uses Immodium every day.  Stooling ----- empties bag 6-10 times a day. Abdominal pain - when she eats and intermittent. Energy - gone.  Labs- Anemia - Hb lower CRP is higher ---34 (was 16).   ========================== 20  Crohn's disease.   55 yo female with Crohn's disease comes in for f/u.  Dr. Jaquez is considering a GYN etiology.  Needs a pouch exam.  Getting Entyvio every 4 weeks.  Stelara did not work as well as Entyvio.  Normally when she gets an infusion, she gets a bounce and has not.  Prometheus Vedo level 3/25, just before 4 week dose was great_ __ _32.5 and no antibodies.  Feb 10 labs looked good CRP 29.9 and cbc looked fine. ESR elevation.  2 liters of miralax_ __ _ Try 6-8am _ __ _- and then an exam at 2-3 pm She asked about a pilonidal cyst and I directed to Dr. Talamantes  Xifaxan  Try pepto bismol 1-2 prn on days.    ===== 20  55 yo female comes in at my request for 3 week f/u of Crohn's due to new 4 x eleation of CRP at 29.9.  19 CRP was similarly elevated 4x at 2.1/.5  Leonid is checking CT abd-pelvis due to pelvic pain which is not characteristic of her Crohn's.  She is wondering about GYN. Or spastic bladder.  Dr. Talamantes thought stable but wants to scope to be sure.  Empties bag 10x a day.That is more than 3-6 months ago and has leakage of the ostomy bag. Maybe more volume. Very watery.  Review of labs from 2/10 show cRP 29.i9 but others are stable. CBC looks good with trace elevation of wbc and borderline anemia. Getting iv iron from Arianna Jaquez. Vit D is normal at 39. Albumin is normal at 4.5.  MRI 10/1/19 showed gallstones , present in past, and she has mild pain that is intermittent Liver tests are nl. Can assess with HIDA, and/or with surgical consult, e.g ???relative need for choly, high vs. low.    ================ 20  55 yo female with severe Crohn's disease and h/o breast cancer _ _ Arianna Jaquez MD on Letrasol. 10th year.  Abdominal pain is confusing. Has gotten pain in the ovary and in the uterus. GYN-Ann Laird MD and did US was fine. Sent to PT for pelvic treatment.  At her comment, I reviewed [facility] imaging 16 nl CTE 5/3/17 nl [facility] US Oct 2 2019 "Cholelithiasis" o/w MRE was nl - no inflammation. BUT THE MRI DID SHOW "PERSISTENT TETHERING OF UTERUS TO RECTAL STUMP"  On Entyvio 300 mg q 4 weeks. Next dose in 2 weeks On mtx _ _.5 cc. Monthly b12 shots  Eating ok, some weight loss. Weight stable x 6 months and down 30 lb since _ __ _- she is comfortable with current weight.  ============= 19  55 yo female with severe Crohn's and h/o breast cancer comes in for 3 month f/u.  Doing poorly.  Daily abd pain which is intermittent.  When she eats she gets pain and nausea.  Taking zofran and phenergan.  Sleep pattern is awry.  Empties bag 5 times a day and going out of rectum.  Has a loop ileostomy that may be problematic.  Allergic to tape.  Currently on Entyvio 300 mg q 8 weeks.  Stelara did not work as well.  Discussed the entyvio at shorter cycles of q 4 weeks to see if that will control syx better.  Outputs are higher and she may be volume depleted and maybe more entyvio will help.  last entyvio was last week and so a level won't be useful today. Will just proceed with dose increase.    ============== 18 51 yo female with Crohn's disease and breast cancer and diverting ileostomy from Dr. Talamantes.  Skin issue - addressed by Dr. Best.  See Brooke Talamantes MD and enterostomal therapist.  AGWS.  No fcs.     Review of Systems ConstitutionalDenies : body aches, fever, weight gain, weight loss CardiovascularDenies : chest pain, heart racing/skipping, high blood pressure RespiratoryDenies : chronic cough, shortness of breath, wheezing or asthma symptoms GastrointestinalDenies : Abdominal Pain/discomfort, Anal/Rectal Pain or Itching, Anal Spasm, Black Stool, Bloating/belching/gaseouness, Change of Bowel Habit, Constipation, Diarrhea/Loose Stool, Difficulty in Swallowing, Heartburn/esophageal reflux, Hemorrhoids, Indigestion, Mucus in Stool, Nausea/vomiting, Rectal Bleeding, Unintentional Weight Loss   Vitals Date Time BP Position Site L\R Cuff Size HR RR TEMP (F) WT HT BMI kg/m2 BSA m2 O2 Sat HC  2020 05:15 PM 133lbs 0oz 5' 8.5" 19.93 1.71       Assessment Crohn's Disease 555.2  Diarrhea 787.91/R19.7  Cholelithiasis 574.20/K80.20  Immunosuppression due to drug therapy V58.69/Z79.899   Problems Reconciled Plan OrdersPatient not identified as an unhealthy alcohol user when screene () - - 2020 Influenza immunization administered or previously received () - - 2020 BMI screening above normal () - - 2020 Current tobacco non-user (CAD, cap, COPD, PV) (dm) (IBD) (1036F, ) - - 2020 Colorectal cancer screening results documented and reviewed (PV) (3017F) - - 2020 Documentation of current medications. () - - 2020 EGD w/Biopsy if indicated (05362) - - 2020 Pouchoscopy (94730) - - 2020 CMP (comprehensive metabolic panel) (72212) - - 2020 Sed rate (82200) - - 2020 C-reactive protein (22877) - - 2020 Ferritin assay (59816) - - 2020 Iron + iron binding capacity panel ser/plas (68032, 75102) - - 2020 Vitamin B12 and folate measurement (94402, 43881) - - 2020 CBC with diff (13326) - - 2020 25-OH-Vitamin D (21721) - - 2020 GPP 22 (FilmArray) - Gastrointestinal Pathogen Panel - QDx (44182) - - 2020 MedicationsMedications have been Reconciled Transition of Care or Provider Policy InstructionsPatient seen today via telehealth by agreement and consent of patient in light of current COVID-19 pandemic. I used video conferencing during the visit. The patient encounter is appropriate and reasonable under the circumstances given the patient's particular presentation at this time. The patient has been advised of the followin) the potential risks and limitations of this mode of treatment (including but not limited to the absence of in-person examination); 2) the right to refuse telehealth services at any point without affecting the right to future care; 3) the right to receive in-person services, included immediately after this consultation if an urgent need arises; 4) information, including identifiable images or information from this telehealth consult, will only be shared in accordance with HIPPA regulations. Any and all of the patient's and/or patient's family member's questions on this issue have been answered. The patient has verbally consented to be treated via telehealth services. The patient has also been advised to contact this office for worsening conditions or problems, and seek emergency medical treatment and/or call 911 if the patient deems either necessary. (For commercial payers, telehealth video only) More than half of the face-to-face time used for counseling and coordination of care. 40 min appointment (81761 EP) I have documented a list of current medications and reviewed it with the patient. I encouraged the patient to diet and exercise. DispositionReturn To Clinic in 2 Months CorrespondenceCC this document (Slade Auguste MD, Brooke Talamantes MD, Arianna Jaquez MD) - 2020  10117 exam med 32 min time  needs refill or lomotil Can't eRx lomotil.  Aciphex refill.  Needs EGD and pouchoscopy        Electronically Signed by: MD MIGUEL A Carpio  cc: note to Dr. Brooke Talamantes 23

## 2023-11-11 LAB
A/G RATIO: 1.7
ALBUMIN: 4.2
ALKALINE PHOSPHATASE: 97
ALT (SGPT): 7
AST (SGOT): 13
BASO (ABSOLUTE): (no result)
BASOS: (no result)
BILIRUBIN, TOTAL: <0.2
BUN/CREATININE RATIO: 36
BUN: 34
C-REACTIVE PROTEIN, QUANT: 4
CALCIUM: 9.1
CARBON DIOXIDE, TOTAL: 17
CHLORIDE: 107
CREATININE: 0.94
EGFR: 71
EOS (ABSOLUTE): (no result)
EOS: (no result)
FERRITIN, SERUM: 1670
GLOBULIN, TOTAL: 2.5
GLUCOSE: 88
HEMATOCRIT: (no result)
HEMATOLOGY COMMENTS:: (no result)
HEMOGLOBIN: (no result)
IMMATURE CELLS: (no result)
IMMATURE GRANS (ABS): (no result)
IMMATURE GRANULOCYTES: (no result)
IRON BIND.CAP.(TIBC): 231
IRON SATURATION: 61
IRON: 140
LYMPHS (ABSOLUTE): (no result)
LYMPHS: (no result)
MAGNESIUM: 1.9
MCH: (no result)
MCHC: (no result)
MCV: (no result)
MONOCYTES(ABSOLUTE): (no result)
MONOCYTES: (no result)
NEUTROPHILS (ABSOLUTE): (no result)
NEUTROPHILS: (no result)
NRBC: (no result)
PLATELETS: (no result)
POTASSIUM: 4.7
PROTEIN, TOTAL: 6.7
RBC: (no result)
RDW: (no result)
REQUEST PROBLEM: (no result)
SEDIMENTATION RATE-WESTERGREN: (no result)
SODIUM: 140
UIBC: 91
WBC: (no result)

## 2023-11-15 LAB
A/G RATIO: 1.6
ALBUMIN: 4.5
ALKALINE PHOSPHATASE: 108
ALT (SGPT): 10
AST (SGOT): 14
BASO (ABSOLUTE): 0.1
BASOS: 1
BILIRUBIN, TOTAL: 0.3
BUN/CREATININE RATIO: 25
BUN: 29
C-REACTIVE PROTEIN, QUANT: <1
CALCIUM: 9.4
CARBON DIOXIDE, TOTAL: 13
CHLORIDE: 108
CREATININE: 1.17
EGFR: 54
EOS (ABSOLUTE): 0.2
EOS: 2
FERRITIN, SERUM: 1968
FOLATE (FOLIC ACID), SERUM: >20
GLOBULIN, TOTAL: 2.9
GLUCOSE: 83
HEMATOCRIT: 35.9
HEMATOLOGY COMMENTS:: (no result)
HEMOGLOBIN: 12
IMMATURE CELLS: (no result)
IMMATURE GRANS (ABS): 0
IMMATURE GRANULOCYTES: 0
IRON BIND.CAP.(TIBC): <250
IRON SATURATION: >93
IRON: 233
LYMPHS (ABSOLUTE): 2.1
LYMPHS: 28
MAGNESIUM: 1.4
MCH: 35.8
MCHC: 33.4
MCV: 107
MONOCYTES(ABSOLUTE): 0.6
MONOCYTES: 8
NEUTROPHILS (ABSOLUTE): 4.5
NEUTROPHILS: 61
NRBC: (no result)
PLATELETS: 168
POTASSIUM: 4
PROTEIN, TOTAL: 7.4
RBC: 3.35
RDW: 12.7
SEDIMENTATION RATE-WESTERGREN: 28
SODIUM: 137
UIBC: <17
VITAMIN B12: 427
WBC: 7.5

## 2023-11-17 ENCOUNTER — TELEPHONE ENCOUNTER (OUTPATIENT)
Dept: URBAN - METROPOLITAN AREA CLINIC 98 | Facility: CLINIC | Age: 58
End: 2023-11-17

## 2023-11-17 RX ORDER — PREDNISONE 10 MG/1
TAKE 40 MG DAILY TABLET ORAL ONCE A DAY
Status: ACTIVE | COMMUNITY

## 2023-11-17 RX ORDER — RISANKIZUMAB-RZAA 360 MG/2.4
360 MG WEARABLE INJECTOR SUBCUTANEOUS
Qty: 1 KIT | Refills: 8
Start: 2022-10-19 | End: 2025-04-04

## 2023-11-17 RX ORDER — RABEPRAZOLE SODIUM 20 MG/1
TAKE 1 TABLET BY MOUTH TWICE DAILY TABLET, DELAYED RELEASE ORAL
Qty: 180 | Refills: 3 | Status: ACTIVE | COMMUNITY

## 2023-11-17 RX ORDER — METHOTREXATE 25 MG/ML
INJECT 1 ML INTO THE MUSCLE EVERY 7 DAYS INJECTION, SOLUTION INTRA-ARTERIAL; INTRAMUSCULAR; INTRATHECAL; INTRAVENOUS
Qty: 4 VIALS | Refills: 6 | Status: ACTIVE | COMMUNITY

## 2023-11-17 RX ORDER — LETROZOLE TABLETS 2.5 MG/1
TAKE 1 TABLET (2.5 MG) BY ORAL ROUTE ONCE DAILY TABLET, FILM COATED ORAL 1
Qty: 0 | Refills: 0 | Status: ACTIVE | COMMUNITY
Start: 1900-01-01

## 2023-11-17 RX ORDER — RISANKIZUMAB-RZAA 360 MG/2.4
360 MG WEARABLE INJECTOR SUBCUTANEOUS
Qty: 1 KIT | Refills: 8 | Status: ACTIVE | COMMUNITY
Start: 2022-10-19 | End: 2024-03-06

## 2023-11-17 RX ORDER — ALENDRONATE SODIUM 70 MG
TABLET ORAL
Qty: 0 | Refills: 0 | Status: ACTIVE | COMMUNITY
Start: 1900-01-01

## 2023-11-17 RX ORDER — IBUPROFEN 100 MG/1
TAKE 2 TABLETS (200 MG) BY ORAL ROUTE EVERY 6 HOURS AS NEEDED WITH FOOD TABLET, COATED ORAL
Qty: 0 | Refills: 0 | Status: ACTIVE | COMMUNITY
Start: 1900-01-01

## 2023-11-17 RX ORDER — DIPHENOXYLATE HYDROCHLORIDE AND ATROPINE SULFATE 2.5; .025 MG/1; MG/1
TAKE 2 TABLETS TABLET ORAL TWICE DAILY
Qty: 120 TABLETS | Refills: 5 | Status: ACTIVE | COMMUNITY
Start: 2023-08-08 | End: 2024-03-09

## 2023-11-17 RX ORDER — ERGOCALCIFEROL CAPSULES, 1.25 MG/1
1 CAPSULE CAPSULE ORAL
Qty: 12 CAPSULES | Refills: 3 | Status: ACTIVE | COMMUNITY
Start: 2023-04-05 | End: 2024-03-30

## 2023-11-17 RX ORDER — LIDOCAINE HYDROCHLORIDE 20 MG/ML
SWISH AND SPIT 15ML BY MOUTH THREE TIMES A DAY SOLUTION ORAL; TOPICAL
Qty: 800 MILLILITER | Refills: 2 | Status: ACTIVE | COMMUNITY

## 2023-11-17 RX ORDER — ATOGEPANT 10 MG/1
1 TABLET TABLET ORAL ONCE A DAY
Status: ACTIVE | COMMUNITY

## 2023-11-17 RX ORDER — OMEPRAZOLE 40 MG/1
1 CAPSULE 30 MINUTES BEFORE MORNING MEAL CAPSULE, DELAYED RELEASE ORAL ONCE A DAY
Qty: 90 CAPSULES | Refills: 3 | Status: ACTIVE | COMMUNITY
Start: 2023-04-05

## 2023-11-17 RX ORDER — VEDOLIZUMAB 300 MG/5ML
INFUSE 300 MG OVER 30 MINUTE(S) BY INTRAVENOUS ROUTE EVERY 4 WEEKS INJECTION, POWDER, LYOPHILIZED, FOR SOLUTION INTRAVENOUS
Qty: 1 | Refills: 11 | Status: DISCONTINUED | COMMUNITY
Start: 1900-01-01

## 2023-11-17 RX ORDER — VITAMIN A 2400 MCG
1 TABLET CAPSULE ORAL ONCE A DAY
Qty: 90 TABLET | Refills: 1 | Status: ACTIVE | COMMUNITY
Start: 2022-01-12

## 2023-11-17 RX ORDER — PROMETHAZINE HYDROCHLORIDE 25 MG/1
TAKE 1 TABLETS TABLET ORAL BID
Qty: 60 TABLETS | Refills: 5 | Status: ACTIVE | COMMUNITY
Start: 2021-02-12

## 2023-11-17 RX ORDER — PROMETHAZINE HYDROCHLORIDE 6.25 MG/5ML
TAKE 5 ML BY MOUTH AT BEDTIME SOLUTION ORAL
Qty: 150 ML | Refills: 11 | Status: ACTIVE | COMMUNITY

## 2023-11-17 RX ORDER — IBUPROFEN 200 MG/1
TAKE 1 CAPSULE (200 MG) BY ORAL ROUTE EVERY 6 HOURS AS NEEDED CAPSULE, LIQUID FILLED ORAL
Qty: 0 | Refills: 0 | Status: ACTIVE | COMMUNITY
Start: 1900-01-01

## 2023-11-17 RX ORDER — RABEPRAZOLE SODIUM 20 MG/1
TAKE 1 TABLET TABLET, DELAYED RELEASE ORAL TWICE DAILY
Qty: 180 TABLETS | Refills: 6 | Status: ACTIVE | COMMUNITY
Start: 2019-02-17

## 2023-11-17 RX ORDER — RABEPRAZOLE SODIUM 20 MG/1
1 TABLET TABLET, DELAYED RELEASE ORAL ONCE A DAY
Qty: 90 TABLET | Refills: 3 | Status: ACTIVE | COMMUNITY
Start: 2023-06-09

## 2023-11-17 RX ORDER — DICYCLOMINE HYDROCHLORIDE 20 MG/1
TAKE TWO TABLETS BY MOUTH FOUR TIMES A DAY TABLET ORAL
Qty: 240 TABLET | Refills: 11 | Status: ACTIVE | COMMUNITY

## 2023-11-28 ENCOUNTER — TELEPHONE ENCOUNTER (OUTPATIENT)
Dept: URBAN - METROPOLITAN AREA CLINIC 98 | Facility: CLINIC | Age: 58
End: 2023-11-28

## 2023-12-18 ENCOUNTER — TELEPHONE ENCOUNTER (OUTPATIENT)
Dept: URBAN - METROPOLITAN AREA CLINIC 98 | Facility: CLINIC | Age: 58
End: 2023-12-18

## 2023-12-22 LAB
A/G RATIO: 1.5
ALBUMIN: 4.3
ALKALINE PHOSPHATASE: 104
ALT (SGPT): 13
AST (SGOT): 13
BASO (ABSOLUTE): 0
BASOS: 1
BILIRUBIN, TOTAL: 0.2
BUN/CREATININE RATIO: 21
BUN: 23
C-REACTIVE PROTEIN, QUANT: 1
CALCIUM: 9.1
CARBON DIOXIDE, TOTAL: 17
CHLORIDE: 106
CREATININE: 1.09
EGFR: 59
EOS (ABSOLUTE): 0.2
EOS: 3
FERRITIN, SERUM: 1431
FOLATE (FOLIC ACID), SERUM: >20
GLOBULIN, TOTAL: 2.8
GLUCOSE: 83
HEMATOCRIT: 38.5
HEMATOLOGY COMMENTS:: (no result)
HEMOGLOBIN: 13.1
IMMATURE CELLS: (no result)
IMMATURE GRANS (ABS): 0
IMMATURE GRANULOCYTES: 0
IRON BIND.CAP.(TIBC): <195
IRON SATURATION: >91
IRON: 178
LYMPHS (ABSOLUTE): 1.9
LYMPHS: 24
MAGNESIUM: 1.1
MCH: 35.9
MCHC: 34
MCV: 106
MONOCYTES(ABSOLUTE): 0.5
MONOCYTES: 6
NEUTROPHILS (ABSOLUTE): 5.5
NEUTROPHILS: 66
NRBC: (no result)
PLATELETS: 160
POTASSIUM: 3.4
PROTEIN, TOTAL: 7.1
RBC: 3.65
RDW: 12.3
SEDIMENTATION RATE-WESTERGREN: 6
SODIUM: 137
UIBC: <17
VITAMIN B12: 411
WBC: 8.2

## 2023-12-27 ENCOUNTER — TELEPHONE ENCOUNTER (OUTPATIENT)
Dept: URBAN - METROPOLITAN AREA CLINIC 98 | Facility: CLINIC | Age: 58
End: 2023-12-27

## 2024-01-02 ENCOUNTER — LAB OUTSIDE AN ENCOUNTER (OUTPATIENT)
Dept: URBAN - METROPOLITAN AREA CLINIC 98 | Facility: CLINIC | Age: 59
End: 2024-01-02

## 2024-01-03 ENCOUNTER — P2P PATIENT RECORD (OUTPATIENT)
Age: 59
End: 2024-01-03

## 2024-01-03 LAB
A/G RATIO: 1.5
ALBUMIN: 4.4
ALKALINE PHOSPHATASE: 99
ALT (SGPT): 7
AST (SGOT): 12
BASO (ABSOLUTE): 0.1
BASOS: 1
BILIRUBIN, TOTAL: <0.2
BUN/CREATININE RATIO: 24
BUN: 26
C-REACTIVE PROTEIN, QUANT: 4
CALCIUM: 9
CARBON DIOXIDE, TOTAL: 17
CHLORIDE: 104
CREATININE: 1.1
EGFR: 59
EOS (ABSOLUTE): 0.1
EOS: 2
FERRITIN, SERUM: 1480
FOLATE (FOLIC ACID), SERUM: >20
GLOBULIN, TOTAL: 3
GLUCOSE: 87
HEMATOCRIT: 35.7
HEMATOLOGY COMMENTS:: (no result)
HEMOGLOBIN: 12.3
IMMATURE CELLS: (no result)
IMMATURE GRANS (ABS): 0
IMMATURE GRANULOCYTES: 0
IRON BIND.CAP.(TIBC): 213
IRON SATURATION: 87
IRON: 186
LYMPHS (ABSOLUTE): 2.2
LYMPHS: 29
MAGNESIUM: 1.3
MCH: 35.7
MCHC: 34.5
MCV: 104
MONOCYTES(ABSOLUTE): 0.7
MONOCYTES: 8
NEUTROPHILS (ABSOLUTE): 4.7
NEUTROPHILS: 60
NRBC: (no result)
PLATELETS: 171
POTASSIUM: 4.2
PROTEIN, TOTAL: 7.4
RBC: 3.45
RDW: 12.2
SEDIMENTATION RATE-WESTERGREN: 21
SODIUM: 138
UIBC: 27
VITAMIN B12: 475
WBC: 7.7

## 2024-01-17 ENCOUNTER — OFFICE VISIT (OUTPATIENT)
Dept: URBAN - METROPOLITAN AREA CLINIC 98 | Facility: CLINIC | Age: 59
End: 2024-01-17
Payer: COMMERCIAL

## 2024-01-17 ENCOUNTER — TELEPHONE ENCOUNTER (OUTPATIENT)
Dept: URBAN - METROPOLITAN AREA CLINIC 98 | Facility: CLINIC | Age: 59
End: 2024-01-17

## 2024-01-17 VITALS
BODY MASS INDEX: 18.66 KG/M2 | TEMPERATURE: 97.9 F | DIASTOLIC BLOOD PRESSURE: 62 MMHG | HEIGHT: 69 IN | WEIGHT: 126 LBS | SYSTOLIC BLOOD PRESSURE: 89 MMHG | HEART RATE: 100 BPM

## 2024-01-17 DIAGNOSIS — E83.42 HYPOMAGNESEMIA: ICD-10-CM

## 2024-01-17 DIAGNOSIS — K50.80 CROHN'S DISEASE OF BOTH SMALL AND LARGE INTESTINE: ICD-10-CM

## 2024-01-17 DIAGNOSIS — K50.90 CROHN DISEASE: ICD-10-CM

## 2024-01-17 DIAGNOSIS — Z93.2 ILEOSTOMY IN PLACE: ICD-10-CM

## 2024-01-17 DIAGNOSIS — R10.84 GENERALIZED ABDOMINAL PAIN: ICD-10-CM

## 2024-01-17 PROCEDURE — 99215 OFFICE O/P EST HI 40 MIN: CPT | Performed by: INTERNAL MEDICINE

## 2024-01-17 RX ORDER — RABEPRAZOLE SODIUM 20 MG/1
TAKE 1 TABLET TABLET, DELAYED RELEASE ORAL TWICE DAILY
Qty: 180 TABLETS | Refills: 6 | Status: ACTIVE | COMMUNITY
Start: 2019-02-17

## 2024-01-17 RX ORDER — ALENDRONATE SODIUM 70 MG
TABLET ORAL
Qty: 0 | Refills: 0 | Status: ACTIVE | COMMUNITY
Start: 1900-01-01

## 2024-01-17 RX ORDER — ERGOCALCIFEROL CAPSULES, 1.25 MG/1
1 CAPSULE CAPSULE ORAL
Qty: 12 CAPSULES | Refills: 3 | Status: ACTIVE | COMMUNITY
Start: 2023-04-05 | End: 2024-03-30

## 2024-01-17 RX ORDER — VITAMIN A 2400 MCG
1 TABLET CAPSULE ORAL ONCE A DAY
Qty: 90 TABLET | Refills: 1 | Status: ACTIVE | COMMUNITY
Start: 2022-01-12

## 2024-01-17 RX ORDER — RABEPRAZOLE SODIUM 20 MG/1
TAKE 1 TABLET BY MOUTH TWICE DAILY TABLET, DELAYED RELEASE ORAL
Qty: 180 | Refills: 3 | Status: ACTIVE | COMMUNITY

## 2024-01-17 RX ORDER — RISANKIZUMAB-RZAA 360 MG/2.4
360 MG WEARABLE INJECTOR SUBCUTANEOUS
Qty: 1 KIT | Refills: 8 | Status: ACTIVE | COMMUNITY
Start: 2022-10-19 | End: 2025-04-04

## 2024-01-17 RX ORDER — DIPHENOXYLATE HYDROCHLORIDE AND ATROPINE SULFATE 2.5; .025 MG/1; MG/1
TAKE 2 TABLETS TABLET ORAL TWICE DAILY
Qty: 120 TABLETS | Refills: 5
Start: 2023-08-08 | End: 2024-07-15

## 2024-01-17 RX ORDER — IBUPROFEN 100 MG/1
TAKE 2 TABLETS (200 MG) BY ORAL ROUTE EVERY 6 HOURS AS NEEDED WITH FOOD TABLET, COATED ORAL
Qty: 0 | Refills: 0 | Status: ACTIVE | COMMUNITY
Start: 1900-01-01

## 2024-01-17 RX ORDER — PREDNISONE 10 MG/1
TAKE 40 MG DAILY TABLET ORAL ONCE A DAY
Status: ACTIVE | COMMUNITY

## 2024-01-17 RX ORDER — ATOGEPANT 10 MG/1
1 TABLET TABLET ORAL ONCE A DAY
Status: ACTIVE | COMMUNITY

## 2024-01-17 RX ORDER — PROMETHAZINE HYDROCHLORIDE 6.25 MG/5ML
TAKE 5 ML BY MOUTH AT BEDTIME SOLUTION ORAL
Qty: 150 ML | Refills: 11 | Status: ACTIVE | COMMUNITY

## 2024-01-17 RX ORDER — RABEPRAZOLE SODIUM 20 MG/1
1 TABLET TABLET, DELAYED RELEASE ORAL ONCE A DAY
Qty: 90 TABLET | Refills: 3 | Status: ACTIVE | COMMUNITY
Start: 2023-06-09

## 2024-01-17 RX ORDER — LETROZOLE TABLETS 2.5 MG/1
TAKE 1 TABLET (2.5 MG) BY ORAL ROUTE ONCE DAILY TABLET, FILM COATED ORAL 1
Qty: 0 | Refills: 0 | Status: ACTIVE | COMMUNITY
Start: 1900-01-01

## 2024-01-17 RX ORDER — PROMETHAZINE HYDROCHLORIDE 25 MG/1
TAKE 1 TABLETS TABLET ORAL BID
Qty: 60 TABLETS | Refills: 5 | Status: ACTIVE | COMMUNITY
Start: 2021-02-12

## 2024-01-17 RX ORDER — LIDOCAINE HYDROCHLORIDE 20 MG/ML
SWISH AND SPIT 15ML BY MOUTH THREE TIMES A DAY SOLUTION ORAL; TOPICAL
Qty: 800 MILLILITER | Refills: 2 | Status: ACTIVE | COMMUNITY

## 2024-01-17 RX ORDER — OMEPRAZOLE 40 MG/1
1 CAPSULE 30 MINUTES BEFORE MORNING MEAL CAPSULE, DELAYED RELEASE ORAL ONCE A DAY
Qty: 90 CAPSULES | Refills: 3 | Status: ACTIVE | COMMUNITY
Start: 2023-04-05

## 2024-01-17 RX ORDER — IBUPROFEN 200 MG/1
TAKE 1 CAPSULE (200 MG) BY ORAL ROUTE EVERY 6 HOURS AS NEEDED CAPSULE, LIQUID FILLED ORAL
Qty: 0 | Refills: 0 | Status: ACTIVE | COMMUNITY
Start: 1900-01-01

## 2024-01-17 RX ORDER — DIPHENOXYLATE HYDROCHLORIDE AND ATROPINE SULFATE 2.5; .025 MG/1; MG/1
TAKE 2 TABLETS TABLET ORAL TWICE DAILY
Qty: 120 TABLETS | Refills: 5 | Status: ACTIVE | COMMUNITY
Start: 2023-08-08 | End: 2024-03-09

## 2024-01-17 RX ORDER — DICYCLOMINE HYDROCHLORIDE 20 MG/1
TAKE TWO TABLETS BY MOUTH FOUR TIMES A DAY TABLET ORAL
Qty: 240 TABLET | Refills: 11 | Status: ACTIVE | COMMUNITY

## 2024-01-17 RX ORDER — METHOTREXATE 25 MG/ML
INJECT 1 ML INTO THE MUSCLE EVERY 7 DAYS INJECTION, SOLUTION INTRA-ARTERIAL; INTRAMUSCULAR; INTRATHECAL; INTRAVENOUS
Qty: 4 VIALS | Refills: 6 | Status: ACTIVE | COMMUNITY

## 2024-01-17 NOTE — HPI-TODAY'S VISIT:
59 yo female with severe CD and short gut now on TPN got 3 weeks after being off it for 2 months, has appt with  Guzman Taylor , and now here for interval f/u.  Things overall going well. Empties pouch 8-10 per day. TPN is 24 hours a day, may get decreased to 18 per day. Has intermittent abdominal pain, daily, and takes pain medicine to control it. Using 800 mg motrin for joint pain and abdominal pain. she knows Motrin can flare Crohns disease.  Dicyclomine 20 mg qid. Takes lomotil in conjunction with lomotil. Wants to increase lomotil from 2 bid to 2 qid.  Labs pending.     ============================== 23  59 yo female returns for 2 month f/u. She has improved greatly on TPN. She had approximately 2-3 months of TPN and home health. On Skyrizi 360 mg sq q 8w.  Empties pouch 8-12 times a day. No ER visits for 2-3 months while receiving TPN. Consultation with Dr. Taylor at [facility] is pending and currently for 2024. I was encouraged that on Skyrizi the stool biomarkers are nl.  Ab to try some meat and other foods at this point and will see how it goes.  ================================== 23  59 yo female with Crohn's disease comes in for post hosp f/u.  Last resection was 2023 and diarrhea worse since then. 6 foot resection per Juanita.????  Hosp for a week and d/c 9 days ago.  Syx on admission. Weakness and low BP. Saw Dr. Reynoso who admitted her.  Has Passport in left arm. Infusions: TPN from ??????? Getting 1000 ml fluid a day. Getting TPN at Home is 16 hours a day  Percocet Methocarbinmol Folic acid Lomotil 3 bid Immodiuym Paxil Gabapentin  [facility] has tincture of opium. Appt with Dr. Reynoso  She likes the skyrizi.   ================================ 7/10/23  59 yo female with h/o Breast cancer and Crohn's disease which is severe.  Doing well on Skyrizi. Diarrhea is better as result of Skyrizi and surgery. Arthritis is better.  Issues: 1) Ostomy outputs are increased. taking more lomotil, increased lomotil from 2 to 3 per day and 3 immodium tid.  Consider tincture of opium.  Multiple checks for c diff ---- never been positive.  Eating better. Blood pressure is low.  Offered a diecitian - declines.  Will use tincture of opium 5 drop qid in lieu of lomotil.    ============================ 23  56 yo female with severe CD. Had pain and vomiting and was admitted to [facility] 3/3 and had ileostomy revision and resection of 6 feet of intestin on 3/6/23 under Dr. Talamantes's care. Hosp 2.5 weeks.  Feeling so much better. Less pain.  still on oxycodone 7.5 - GP gives it to her. Hope is to get off pain. No steroids. Off budesonide Off mtx. Just got 2nd injection of Skyrizi  23===========  56 yo female with severe Crohn's on Skyrizi and budesonide 3mg one per day, methotrexate .5 cc qow --due to mouth ulcers, decreased from 1 cc per week, .5 cc per week and then the currnet .5 cc qow lomotil 2 bid Dicyclomine 4 (20)bid ----- should consider 2 qid Aciphex 1 bid  ESR has stayed elevated but CRP normalized on 22  Called our office Friday afternoon and spoke with Dr. Vuong. Called at 3:30p Friday and then called on call at 5p. c/o waking up with distension and hardness and gassy She thought it could be a partial blockage and so she took colace It sounded like a running faucet but has had regular output. Dr. Talamantes wants a CT stat --- to expedite. Problems: Severe Crohn's disease h/o Breast cancer High ostomy outputs abdominal pain Anorexia Weight loss - 3 lbs Immunosuppression Abdominal distension  ============================ 10/19/22  56 yo female for 2 month f/u. TH from home  Decision made to switch to Skyrizi.  Started Skyrizi in [place] about 6 weeks ago. Next appt is  - iv Dose 3 --- Likellt start 9;/15-Dose 1 Delay in getting Entyvio change to Skyrizi. She is not sure if the Skyrizi is working.   It was 4 weeks--- not excessive between last Entyvio and first Skyrizi.  Stooling is the same as 4 weeks ago. More abd pain Has heaviness and upper abdominal pain.   ====================================================== 22  56 yo female comes in with  Guzman for 3 week follow up.  She feels her Crohn's is better. Ileostomy outputs similar. 5-7 per day. No blood. Mouth is much better. Less pain. Rectum is in place. Mucus and stool per rectum. Blood per rectum She feels closing her up is not an answer.  Problems with back --- spinal stenosis - Guzman Barajas MD [facility]. Hip and Knee hurt. Has an appointment with [facility].  Not eating much. Lost 6 more lbs. Been sick for 1.5 weeks - does not know. Headache and neck ache, could not lift head. Has been exhausted and dizzy. Can't walk in a straight line.  Has primary MD. Slade Auguste MD - En[facility] Horner. Less steady when whe walks. Very weak. Difficulty getting here for the appointment.  Not eating because not hung  Meds: Entyvio 300 mg iv q 4w Gabapentin 300 mg po tid started 3 weeks ago Lomotil Paxil Dicyclomine Folic acid Ibuprofen Trazodone  For upcoming back fusion with Dr Dopson  Alllergic to tape, levoquin, sulfa   ===================== 22  56 yo female with severe CD on Entyvio 300 mg iv q 4 w and mtx 12.5 mg sq q week Budesonide 3 mg a day and folic acid.  Main complaints are: Mouth ulcers Abdominal pain Diarrhea- empties bad 7 times Bad day more than 10/ No blood in stool. Nausea - continuous - awakens with nausea. Eats and nausea. PPI- Aciphex 20 mg bid. Trazodone. No other meds  Next infusion next week.   ======================= 22  57 yo female comes in for urgent f/u re; need for Promethazine 6.25 mg/5ml solution as this works better for her when she has acute nausea and nausea unrelieved by ondansetron. I feel this is an appropriate need and should be approved by insurance even though it is non-formulary.  MRE is pending and plan per radiology is to use barium as volumen as she vomits with it.  She experiences nausea every day due to bowel dysfunction from active Crohns disease and other factors.  Meds: Entyvio 300 mg iv q 4 w Methotrexate 1 cc per week. Wants to reduce to .5 cc per week and that is fine. Folic acid 1 mg a da\7 Budesonide 3 mg now, reduced from 9 m,g =============================== 22  57 yo female returns for 5 week f/u. Stoma is fully healed. No issues with the appliances. Emptying bag 5-6 times a day and coping.  She has been having abdominal pain for the past few weeks. It felt like someone was "trying to pull the uterus out" and she calls it scar tissue. There was nothing on US, to cause the pain. MRE from 21 showed ??problable low grade partial SBO but this may be dysfunction.  She came to see me for the iron deficiency anemia. She has noticed some bleeding. She still has urgency to move her bowels. Yesterday was scary becasue she saw blood in the rectal fluid. Passes rectal fluid 8-10 times a day. At night, it can be horrendous. Bottom of diaper is full.  On Entyvio 300 mg iv q 4 Methotrexate 1 cc q week Budesonide -- 3m 3 per day. -Was feeling bad 6 months ago or so and Bud was started. May be worth trying to taper   ============================= 22  57 yo female with CD and ileostomy comes in for 3 month f/u visit.  Finally healing from stoma revision 10/15/21.  Stoma never stuck out enough and kept  Dr. Talamantes did a stoma revision. Mild "complication" as skin around the stoma, stoma had a "moat" around it.  Empties bag 5-6 times. Interval check. Eating ok. Mouth is healing from the leucovorin to lessen the effect of mtx on mouth.  Getting guidance from Myerson.  ===================================== 10/6/21  57 yo female with Crohn's and avascular necrosis and saw Dr. Myerson 2 days ago and he did blood work has some plans - f/u in 3 weeks.  Sees Dr. Gardiner tomorrow to address major ostomy issues.  Considering ostomy revision. Retracted stoma.  Saw her mother and needed more supplies due to leaking.  Entocort now for 10 weeks, 6 mg a day, empties ostomy 6-9 times.  Has leaked but not in recent weeks.  Never on 9 mg and capri 6 mg for 10 weeks.  Try alternating 6 mg and 3 mg on alternate days.    ========================  21  57 yo female with CD now for 1 month f/u after telehealth and doing better 5 weeks into course of Entocort tx.  Does havd avascular necrosis of hips and understands there are risks of similar complications with Entocort therapy.  Was on fosamax. I just called Dr. Myerson to arrange an appoinment.  Ostomy - empties it 6 times so far today. Occasional abdominal pain.  On Entyvio every 4 weeks Entocort 9 mg a day x 5 weeks.  Asked to have CD4/CD8 checked, by sister's immunologist.   ===================== 8/3/21  57 yo female for  f/u due to beeing weak and in bed. In ER last week and they sent her home.  Meds: Entyvio 300 mg q 4 weeks Entocort x 1 week Off prednisone - had been on prednisone- she does not think it is what was helping her. On 1 cc mtx per week.  Devin Auguste is primary MD Saw him a month ago.  Main syx now.5 days ago was throwing up and was so distended. Could not poke her stomach. went to the ER Wednesday. N/V. Aciphex 1 twice daily. Last looked Never had an ulcer. Ileostomy in place.  Fever on Friday, up to 101. Eating now. Hungry yesterday  Not vomiting.  May neede EGD.     ====================== 21  54 yo female comes in for f/u.  She is "off" per Dr. Jaquez. She did an MRI. Dr. Jaquez reportedly thinks it is her meds. Has not seen a neurologist in years. Recent  labs were nl mostly with Hb 10.1, nl K-4.0 and Ca. Nl ESR and CRP, but had just finished medrol dose eliza.  Wants a Mg level. Labs to be sent to [facility]. Nl quantiferon.  Currently , getting Entyvio every 4 weeks.  Getting entyvio every 4 weeks. Taking 2 weeks off mtx due to stomatitis. If resuming, would reduce from 1 cc to .5 cc..  She is not sure how much she needs mtx and how much it has helped in the past. Mtx has very likely caused the stomatis. Or the pred medrol could have helped the mouth and belly. She has avn all over.  Myerson will see her and order bone scan  Addendum ------21 Has thicker stools Has to change her bag because of high outputs She says her stoma is an outie  She is on pred 20 mg and should taper off it by going to 10 mg to 5 mg for a week and then d/c Risks of prednisone discussed.  Had dropped mtx from 25 to 12.5 and will go back up to 25 mg a week. Consider [place] referral if not improvimg at that point. ============ 21  54 yo female with CD and ostomy for 5 month f/u.  Abd is good. Ostomy is functioning. Sleep thru much of night.  Appetite and intake is better. Oral CD problematic. Has been in touch with Dr. Pepe ---- swish and spit.  Max-- 150. Current weight is 121.  Not taking supplements -- Boost and Ensure. 2-3 cans The y don't agree with her.. R Lactose free ice  She wants low dose prednisone. Lets try 2.5-5 mg a day.  ================== 20  54 yo female for 3 week TH f/u. Has upper abdominal pain that may be due to gallstones. Had referred her to Dr. Moreland but she did not call. She heard he was retiring and not. Has abdominal pain when she. Left upper quadrant or "upper rib cage" mostly left. He knows Dr. Talamantes. - Frustrated---- blisters on lip. Torn open. Has not tried steroid rinse or paste in past. Magic mouth wash helps. Does not reduce the blisters. Dr. Best- non steroid options for mouth   ======================== 10/23/20  10/23 54 yo female for 3 week TH f/u. Has oral Crohn's and I-C and stoma issues. Entyvio may not cover the EIM. Might try oral steroid lozenge --- pred paste and lozenges.  Entyvio 300 q 4 weeks Mtx 1 cc q week Pred 5 mg may stop. Bentyl -  Abd pain is ok No idea what triggered her admission Food induced -  Consider medrol dose eliza in the future  Extensive lab review CBC --- Hct 31.6/Hb 10.9 plt 120 CRP 22 ESR 56 Iron 33% Ferrintin good ================================================== 10/2/20  54 yo female with Crohn's disease recently hosp 3 days at  for abd pain, N/V and got better with 3 dasy 3 days of iv steroids.  Surgery considered.  Was on 40 mg pred - intolerant anxiety  Now dropped to 30 mg a day. Now is 50-60% better than admission.  Surgery debated gallstones. They leaned toward blockage.  Has been on Stelara. Entyvio works better than Stelara. Mtx makes her tired She would like to up the dose of lomotil.  20  53 yo female with Crohnn's disease having continued active syx. Getting Entyvio every 4 weeks. Methotrexate 12.5 mg per week Dicyclomine tablets 2 po qid  == She has taken Xifaxan and it tears her up. Then stop it. Does not like flagyl and does not help.  Pepto 1-2 doses a day, does help the diarreha. She uses Immodium every day.  Stooling ----- empties bag 6-10 times a day. Abdominal pain - when she eats and intermittent. Energy - gone.  Labs- Anemia - Hb lower CRP is higher ---34 (was 16).   ========================== 20  Crohn's disease.   53 yo female with Crohn's disease comes in for f/u.  Dr. Jaquez is considering a GYN etiology.  Needs a pouch exam.  Getting Entyvio every 4 weeks.  Stelara did not work as well as Entyvio.  Normally when she gets an infusion, she gets a bounce and has not.  Prometheus Vedo level 3/25, just before 4 week dose was great_ __ _32.5 and no antibodies.  Feb 10 labs looked good CRP 29.9 and cbc looked fine. ESR elevation.  2 liters of miralax_ __ _ Try 6-8am _ __ _- and then an exam at 2-3 pm She asked about a pilonidal cyst and I directed to Dr. Talamantes  Xifaxan  Try pepto bismol 1-2 prn on days.    ===== 20  53 yo female comes in at my request for 3 week f/u of Crohn's due to new 4 x eleation of CRP at 29.9.  19 CRP was similarly elevated 4x at 2.1/.5  Leonid is checking CT abd-pelvis due to pelvic pain which is not characteristic of her Crohn's.  She is wondering about GYN. Or spastic bladder.  Dr. Talamantes thought stable but wants to scope to be sure.  Empties bag 10x a day.That is more than 3-6 months ago and has leakage of the ostomy bag. Maybe more volume. Very watery.  Review of labs from 2/10 show cRP 29.i9 but others are stable. CBC looks good with trace elevation of wbc and borderline anemia. Getting iv iron from Arianna Jaquez. Vit D is normal at 39. Albumin is normal at 4.5.  MRI 10/1/19 showed gallstones , present in past, and she has mild pain that is intermittent Liver tests are nl. Can assess with HIDA, and/or with surgical consult, e.g ???relative need for choly, high vs. low.    ================ 20  53 yo female with severe Crohn's disease and h/o breast cancer _ _ Arianna Jaquez MD on Letrasol. 10th year.  Abdominal pain is confusing. Has gotten pain in the ovary and in the uterus. GYN-Ann Laird MD and did US was fine. Sent to PT for pelvic treatment.  At her comment, I reviewed [facility] imaging 16 nl CTE 5/3/17 nl [facility] US Oct 2 2019 "Cholelithiasis" o/w MRE was nl - no inflammation. BUT THE MRI DID SHOW "PERSISTENT TETHERING OF UTERUS TO RECTAL STUMP"  On Entyvio 300 mg q 4 weeks. Next dose in 2 weeks On mtx _ _.5 cc. Monthly b12 shots  Eating ok, some weight loss. Weight stable x 6 months and down 30 lb since _ __ _- she is comfortable with current weight.  ============= 19  53 yo female with severe Crohn's and h/o breast cancer comes in for 3 month f/u.  Doing poorly.  Daily abd pain which is intermittent.  When she eats she gets pain and nausea.  Taking zofran and phenergan.  Sleep pattern is awry.  Empties bag 5 times a day and going out of rectum.  Has a loop ileostomy that may be problematic.  Allergic to tape.  Currently on Entyvio 300 mg q 8 weeks.  Stelara did not work as well.  Discussed the entyvio at shorter cycles of q 4 weeks to see if that will control syx better.  Outputs are higher and she may be volume depleted and maybe more entyvio will help.  last entyvio was last week and so a level won't be useful today. Will just proceed with dose increase.    ============== 18 53 yo female with Crohn's disease and breast cancer and diverting ileostomy from Dr. Talamantes.  Skin issue - addressed by Dr. Best.  See Brooke Talamantes MD and enterostomal therapist.  AGWS.  No fcs.     Review of Systems ConstitutionalDenies : body aches, fever, weight gain, weight loss CardiovascularDenies : chest pain, heart racing/skipping, high blood pressure RespiratoryDenies : chronic cough, shortness of breath, wheezing or asthma symptoms GastrointestinalDenies : Abdominal Pain/discomfort, Anal/Rectal Pain or Itching, Anal Spasm, Black Stool, Bloating/belching/gaseouness, Change of Bowel Habit, Constipation, Diarrhea/Loose Stool, Difficulty in Swallowing, Heartburn/esophageal reflux, Hemorrhoids, Indigestion, Mucus in Stool, Nausea/vomiting, Rectal Bleeding, Unintentional Weight Loss   Vitals Date Time BP Position Site L\R Cuff Size HR RR TEMP (F) WT HT BMI kg/m2 BSA m2 O2 Sat HC  2020 05:15 PM 133lbs 0oz 5' 8.5" 19.93 1.71       Assessment Crohn's Disease 555.2  Diarrhea 787.91/R19.7  Cholelithiasis 574.20/K80.20  Immunosuppression due to drug therapy V58.69/Z79.899   Problems Reconciled Plan OrdersPatient not identified as an unhealthy alcohol user when screene () - - 2020 Influenza immunization administered or previously received () - - 2020 BMI screening above normal () - - 2020 Current tobacco non-user (CAD, cap, COPD, PV) (dm) (IBD) (1036F, ) - - 2020 Colorectal cancer screening results documented and reviewed (PV) (3017F) - - 2020 Documentation of current medications. () - - 2020 EGD w/Biopsy if indicated (21082) - - 2020 Pouchoscopy (12520) - - 2020 CMP (comprehensive metabolic panel) (82235) - - 2020 Sed rate (02153) - - 2020 C-reactive protein (11802) - - 2020 Ferritin assay (85651) - - 2020 Iron + iron binding capacity panel ser/plas (63378, 77879) - - 2020 Vitamin B12 and folate measurement (28589, 10009) - - 2020 CBC with diff (23648) - - 2020 25-OH-Vitamin D (94177) - - 2020 GPP 22 (FilmArray) - Gastrointestinal Pathogen Panel - QDx (09280) - - 2020 MedicationsMedications have been Reconciled Transition of Care or Provider Policy InstructionsPatient seen today via telehealth by agreement and consent of patient in light of current COVID-19 pandemic. I used video conferencing during the visit. The patient encounter is appropriate and reasonable under the circumstances given the patient's particular presentation at this time. The patient has been advised of the followin) the potential risks and limitations of this mode of treatment (including but not limited to the absence of in-person examination); 2) the right to refuse telehealth services at any point without affecting the right to future care; 3) the right to receive in-person services, included immediately after this consultation if an urgent need arises; 4) information, including identifiable images or information from this telehealth consult, will only be shared in accordance with HIPPA regulations. Any and all of the patient's and/or patient's family member's questions on this issue have been answered. The patient has verbally consented to be treated via telehealth services. The patient has also been advised to contact this office for worsening conditions or problems, and seek emergency medical treatment and/or call 911 if the patient deems either necessary. (For commercial payers, telehealth video only) More than half of the face-to-face time used for counseling and coordination of care. 40 min appointment (75813 EP) I have documented a list of current medications and reviewed it with the patient. I encouraged the patient to diet and exercise. DispositionReturn To Clinic in 2 Months CorrespondenceCC this document (Slade Auguste MD, Brooke Talamantes MD, Arianna Jaquez MD) - 2020  36762 exam med 32 min time  needs refill or lomotil Can't eRx lomotil.  Aciphex refill.  Needs EGD and pouchoscopy        Electronically Signed by: MD MIGUEL A Carpio  cc: note to Dr. Brooke Talamantes 23

## 2024-01-18 PROBLEM — 89362005: Status: ACTIVE | Noted: 2024-01-18

## 2024-01-18 PROBLEM — 190606006: Status: ACTIVE | Noted: 2024-01-18

## 2024-01-22 ENCOUNTER — TELEPHONE ENCOUNTER (OUTPATIENT)
Dept: URBAN - METROPOLITAN AREA CLINIC 98 | Facility: CLINIC | Age: 59
End: 2024-01-22

## 2024-01-22 RX ORDER — ONDANSETRON HYDROCHLORIDE 8 MG/1
1 TABLET AS NEEDED TABLET, FILM COATED ORAL TWICE DAILY
Qty: 60 TABLETS | Refills: 5 | OUTPATIENT
Start: 2024-01-22

## 2024-01-24 ENCOUNTER — TELEPHONE ENCOUNTER (OUTPATIENT)
Dept: URBAN - METROPOLITAN AREA CLINIC 98 | Facility: CLINIC | Age: 59
End: 2024-01-24

## 2024-01-24 RX ORDER — ONDANSETRON 8 MG/1
1 TABLET ON THE TONGUE AND ALLOW TO DISSOLVE AS NEEDED TABLET, ORALLY DISINTEGRATING ORAL ONCE A DAY
Qty: 30 TABLETS | Refills: 11 | OUTPATIENT
Start: 2024-01-24

## 2024-03-27 ENCOUNTER — OV EP (OUTPATIENT)
Dept: URBAN - METROPOLITAN AREA CLINIC 98 | Facility: CLINIC | Age: 59
End: 2024-03-27
Payer: COMMERCIAL

## 2024-03-27 VITALS
HEIGHT: 69 IN | WEIGHT: 131.8 LBS | HEART RATE: 90 BPM | TEMPERATURE: 97.4 F | SYSTOLIC BLOOD PRESSURE: 80 MMHG | BODY MASS INDEX: 19.52 KG/M2 | DIASTOLIC BLOOD PRESSURE: 61 MMHG

## 2024-03-27 DIAGNOSIS — R63.4 WEIGHT LOSS: ICD-10-CM

## 2024-03-27 DIAGNOSIS — E83.42 HYPOMAGNESEMIA: ICD-10-CM

## 2024-03-27 DIAGNOSIS — K50.90 CROHN DISEASE: ICD-10-CM

## 2024-03-27 DIAGNOSIS — K50.80 CROHN'S DISEASE OF BOTH SMALL AND LARGE INTESTINE: ICD-10-CM

## 2024-03-27 DIAGNOSIS — E44.0 MODERATE PROTEIN-CALORIE MALNUTRITION: ICD-10-CM

## 2024-03-27 DIAGNOSIS — Z93.2 ILEOSTOMY IN PLACE: ICD-10-CM

## 2024-03-27 PROCEDURE — 99215 OFFICE O/P EST HI 40 MIN: CPT | Performed by: INTERNAL MEDICINE

## 2024-03-27 RX ORDER — VITAMIN A 2400 MCG
1 TABLET CAPSULE ORAL ONCE A DAY
Qty: 90 TABLET | Refills: 1 | Status: ACTIVE | COMMUNITY
Start: 2022-01-12

## 2024-03-27 RX ORDER — ALENDRONATE SODIUM 70 MG
TABLET ORAL
Qty: 0 | Refills: 0 | Status: ACTIVE | COMMUNITY
Start: 1900-01-01

## 2024-03-27 RX ORDER — RABEPRAZOLE SODIUM 20 MG/1
TAKE 1 TABLET TABLET, DELAYED RELEASE ORAL TWICE DAILY
Qty: 180 TABLETS | Refills: 6 | Status: ACTIVE | COMMUNITY
Start: 2019-02-17

## 2024-03-27 RX ORDER — PROMETHAZINE HYDROCHLORIDE 6.25 MG/5ML
TAKE 5 ML BY MOUTH AT BEDTIME SOLUTION ORAL
Qty: 150 ML | Refills: 11 | Status: ACTIVE | COMMUNITY

## 2024-03-27 RX ORDER — ATOGEPANT 10 MG/1
1 TABLET TABLET ORAL ONCE A DAY
Status: ACTIVE | COMMUNITY

## 2024-03-27 RX ORDER — RISANKIZUMAB-RZAA 360 MG/2.4
360 MG WEARABLE INJECTOR SUBCUTANEOUS
Qty: 1 KIT | Refills: 8 | Status: ACTIVE | COMMUNITY
Start: 2022-10-19 | End: 2025-04-04

## 2024-03-27 RX ORDER — RABEPRAZOLE SODIUM 20 MG/1
1 TABLET TABLET, DELAYED RELEASE ORAL ONCE A DAY
Qty: 90 TABLET | Refills: 3 | Status: ACTIVE | COMMUNITY
Start: 2023-06-09

## 2024-03-27 RX ORDER — OMEPRAZOLE 40 MG/1
1 CAPSULE 30 MINUTES BEFORE MORNING MEAL CAPSULE, DELAYED RELEASE ORAL ONCE A DAY
Qty: 90 CAPSULES | Refills: 3 | Status: ACTIVE | COMMUNITY
Start: 2023-04-05

## 2024-03-27 RX ORDER — ONDANSETRON HYDROCHLORIDE 8 MG/1
1 TABLET AS NEEDED TABLET, FILM COATED ORAL TWICE DAILY
Qty: 60 TABLETS | Refills: 5 | Status: ACTIVE | COMMUNITY
Start: 2024-01-22

## 2024-03-27 RX ORDER — RABEPRAZOLE SODIUM 20 MG/1
TAKE 1 TABLET BY MOUTH TWICE DAILY TABLET, DELAYED RELEASE ORAL
Qty: 180 | Refills: 3 | Status: ACTIVE | COMMUNITY

## 2024-03-27 RX ORDER — IBUPROFEN 200 MG/1
TAKE 1 CAPSULE (200 MG) BY ORAL ROUTE EVERY 6 HOURS AS NEEDED CAPSULE, LIQUID FILLED ORAL
Qty: 0 | Refills: 0 | Status: ACTIVE | COMMUNITY
Start: 1900-01-01

## 2024-03-27 RX ORDER — PREDNISONE 10 MG/1
TAKE 40 MG DAILY TABLET ORAL ONCE A DAY
Status: ACTIVE | COMMUNITY

## 2024-03-27 RX ORDER — DIPHENOXYLATE HYDROCHLORIDE AND ATROPINE SULFATE 2.5; .025 MG/1; MG/1
TAKE 2 TABLETS TABLET ORAL TWICE DAILY
Qty: 120 TABLETS | Refills: 5 | Status: ACTIVE | COMMUNITY
Start: 2023-08-08 | End: 2024-07-15

## 2024-03-27 RX ORDER — PROMETHAZINE HYDROCHLORIDE 25 MG/1
TAKE 1 TABLETS TABLET ORAL BID
Qty: 60 TABLETS | Refills: 5 | Status: ACTIVE | COMMUNITY
Start: 2021-02-12

## 2024-03-27 RX ORDER — ONDANSETRON 8 MG/1
1 TABLET ON THE TONGUE AND ALLOW TO DISSOLVE AS NEEDED TABLET, ORALLY DISINTEGRATING ORAL ONCE A DAY
Qty: 30 TABLETS | Refills: 11 | Status: ACTIVE | COMMUNITY
Start: 2024-01-24

## 2024-03-27 RX ORDER — IBUPROFEN 100 MG/1
TAKE 2 TABLETS (200 MG) BY ORAL ROUTE EVERY 6 HOURS AS NEEDED WITH FOOD TABLET, COATED ORAL
Qty: 0 | Refills: 0 | Status: ACTIVE | COMMUNITY
Start: 1900-01-01

## 2024-03-27 RX ORDER — ERGOCALCIFEROL CAPSULES, 1.25 MG/1
1 CAPSULE CAPSULE ORAL
Qty: 12 CAPSULES | Refills: 3 | Status: ACTIVE | COMMUNITY
Start: 2023-04-05 | End: 2024-03-30

## 2024-03-27 RX ORDER — LETROZOLE TABLETS 2.5 MG/1
TAKE 1 TABLET (2.5 MG) BY ORAL ROUTE ONCE DAILY TABLET, FILM COATED ORAL 1
Qty: 0 | Refills: 0 | Status: ACTIVE | COMMUNITY
Start: 1900-01-01

## 2024-03-27 RX ORDER — METHOTREXATE 25 MG/ML
INJECT 1 ML INTO THE MUSCLE EVERY 7 DAYS INJECTION, SOLUTION INTRA-ARTERIAL; INTRAMUSCULAR; INTRATHECAL; INTRAVENOUS
Qty: 4 VIALS | Refills: 6 | Status: ACTIVE | COMMUNITY

## 2024-03-27 RX ORDER — LIDOCAINE HYDROCHLORIDE 20 MG/ML
SWISH AND SPIT 15ML BY MOUTH THREE TIMES A DAY SOLUTION ORAL; TOPICAL
Qty: 800 MILLILITER | Refills: 2 | Status: ACTIVE | COMMUNITY

## 2024-03-27 NOTE — HPI-TODAY'S VISIT:
57 yo female with severe Crohn's and short gut returns for 2 month f/u.  Saw Dr. Taylor on  and he took 13 vials of blood. Told he needed zinc. He rx Lomotil 6 po qid with Immodium 4 tid. That is not controlling outputs better. He got her a book on Short Gut.  No ER visits in the last 2 montsh.. Wt is stable at 131.5. TPN is down to 10 hours qod. Eating normally. Emppties pouch 8-10 times a day. Daily abd pain which is intermittent. Arthritis is worse. Allergic to sulfa but tolerating sulfasalazine and getting 1 bid from dr. Mcdaniel.  Never had total body arthritis. Every body part aches. Thumbs and hands are a mess.  She is allergic to tape. May she take a week off TPN TPN labs from 3/21 look very good with alb 3.6.    ============================= 24  57 yo female with severe CD and short gut now on TPN got 3 weeks after being off it for 2 months, has appt with  Guzman Taylor , and now here for interval f/u.  Things overall going well. Empties pouch 8-10 per day. TPN is 24 hours a day, may get decreased to 18 per day. Has intermittent abdominal pain, daily, and takes pain medicine to control it. Using 800 mg motrin for joint pain and abdominal pain. she knows Motrin can flare Crohns disease.  Dicyclomine 20 mg qid. Takes lomotil in conjunction with lomotil. Wants to increase lomotil from 2 bid to 2 qid.  Labs pending.     ============================== 23  57 yo female returns for 2 month f/u. She has improved greatly on TPN. She had approximately 2-3 months of TPN and home health. On Skyrizi 360 mg sq q 8w.  Empties pouch 8-12 times a day. No ER visits for 2-3 months while receiving TPN. Consultation with Dr. Taylor at [facility] is pending and currently for 2024. I was encouraged that on Skyrizi the stool biomarkers are nl.  Ab to try some meat and other foods at this point and will see how it goes.  ================================== 23  57 yo female with Crohn's disease comes in for post hosp f/u.  Last resection was 2023 and diarrhea worse since then. 6 foot resection per Juanita.????  Hosp for a week and d/c 9 days ago.  Syx on admission. Weakness and low BP. Saw Dr. Reynoso who admitted her.  Has Passport in left arm. Infusions: TPN from ??????? Getting 1000 ml fluid a day. Getting TPN at Home is 16 hours a day  Percocet Methocarbinmol Folic acid Lomotil 3 bid Immodiuym Paxil Gabapentin  [facility] has tincture of opium. Appt with Dr. Reynoso  She likes the skyrizi.   ================================ 7/10/23  57 yo female with h/o Breast cancer and Crohn's disease which is severe.  Doing well on Skyrizi. Diarrhea is better as result of Skyrizi and surgery. Arthritis is better.  Issues: 1) Ostomy outputs are increased. taking more lomotil, increased lomotil from 2 to 3 per day and 3 immodium tid.  Consider tincture of opium.  Multiple checks for c diff ---- never been positive.  Eating better. Blood pressure is low.  Offered a diecitian - declines.  Will use tincture of opium 5 drop qid in lieu of lomotil.    ============================ 23  58 yo female with severe CD. Had pain and vomiting and was admitted to [facility] 3/3 and had ileostomy revision and resection of 6 feet of intestin on 3/6/23 under Dr. Talamantes's care. Hosp 2.5 weeks.  Feeling so much better. Less pain.  still on oxycodone 7.5 - GP gives it to her. Hope is to get off pain. No steroids. Off budesonide Off mtx. Just got 2nd injection of Skyrizi  23===========  58 yo female with severe Crohn's on Skyrizi and budesonide 3mg one per day, methotrexate .5 cc qow --due to mouth ulcers, decreased from 1 cc per week, .5 cc per week and then the currnet .5 cc qow lomotil 2 bid Dicyclomine 4 (20)bid ----- should consider 2 qid Aciphex 1 bid  ESR has stayed elevated but CRP normalized on 22  Called our office Friday afternoon and spoke with Dr. Vuong. Called at 3:30p Friday and then called on call at 5p. c/o waking up with distension and hardness and gassy She thought it could be a partial blockage and so she took colace It sounded like a running faucet but has had regular output. Dr. Talamantes wants a CT stat --- to expedite. Problems: Severe Crohn's disease h/o Breast cancer High ostomy outputs abdominal pain Anorexia Weight loss - 3 lbs Immunosuppression Abdominal distension  ============================ 10/19/22  58 yo female for 2 month f/u. TH from home  Decision made to switch to Skyrizi.  Started Skyrizi in [place] about 6 weeks ago. Next appt is  - iv Dose 3 --- Likellt start 9;/15-Dose 1 Delay in getting Entyvio change to Skyrizi. She is not sure if the Skyrizi is working.   It was 4 weeks--- not excessive between last Entyvio and first Skyrizi.  Stooling is the same as 4 weeks ago. More abd pain Has heaviness and upper abdominal pain.   ====================================================== 22  58 yo female comes in with  Guzman for 3 week follow up.  She feels her Crohn's is better. Ileostomy outputs similar. 5-7 per day. No blood. Mouth is much better. Less pain. Rectum is in place. Mucus and stool per rectum. Blood per rectum She feels closing her up is not an answer.  Problems with back --- spinal stenosis - Guzman Barajas MD [facility]. Hip and Knee hurt. Has an appointment with [facility].  Not eating much. Lost 6 more lbs. Been sick for 1.5 weeks - does not know. Headache and neck ache, could not lift head. Has been exhausted and dizzy. Can't walk in a straight line.  Has primary MD. Slade Auguste MD - [place]. Less steady when whe walks. Very weak. Difficulty getting here for the appointment.  Not eating because not hung  Meds: Entyvio 300 mg iv q 4w Gabapentin 300 mg po tid started 3 weeks ago Lomotil Paxil Dicyclomine Folic acid Ibuprofen Trazodone  For upcoming back fusion with Dr Jenn Adkinsgic to tape, levoquin, sulfa   ===================== 22  58 yo female with severe CD on Entyvio 300 mg iv q 4 w and mtx 12.5 mg sq q week Budesonide 3 mg a day and folic acid.  Main complaints are: Mouth ulcers Abdominal pain Diarrhea- empties bad 7 times Bad day more than 10/ No blood in stool. Nausea - continuous - awakens with nausea. Eats and nausea. PPI- Aciphex 20 mg bid. Trazodone. No other meds  Next infusion next week.   ======================= 22  57 yo female comes in for urgent f/u re; need for Promethazine 6.25 mg/5ml solution as this works better for her when she has acute nausea and nausea unrelieved by ondansetron. I feel this is an appropriate need and should be approved by insurance even though it is non-formulary.  MRE is pending and plan per radiology is to use barium as volumen as she vomits with it.  She experiences nausea every day due to bowel dysfunction from active Crohns disease and other factors.  Meds: Entyvio 300 mg iv q 4 w Methotrexate 1 cc per week. Wants to reduce to .5 cc per week and that is fine. Folic acid 1 mg a da\7 Budesonide 3 mg now, reduced from 9 m,g =============================== 22  57 yo female returns for 5 week f/u. Stoma is fully healed. No issues with the appliances. Emptying bag 5-6 times a day and coping.  She has been having abdominal pain for the past few weeks. It felt like someone was "trying to pull the uterus out" and she calls it scar tissue. There was nothing on US, to cause the pain. MRE from 21 showed ??problable low grade partial SBO but this may be dysfunction.  She came to see me for the iron deficiency anemia. She has noticed some bleeding. She still has urgency to move her bowels. Yesterday was scary becasue she saw blood in the rectal fluid. Passes rectal fluid 8-10 times a day. At night, it can be horrendous. Bottom of diaper is full.  On Entyvio 300 mg iv q 4 Methotrexate 1 cc q week Budesonide -- 3m 3 per day. -Was feeling bad 6 months ago or so and Bud was started. May be worth trying to taper   ============================= 22  57 yo female with CD and ileostomy comes in for 3 month f/u visit.  Finally healing from stoma revision 10/15/21.  Stoma never stuck out enough and kept  Dr. Talamantes did a stoma revision. Mild "complication" as skin around the stoma, stoma had a "moat" around it.  Empties bag 5-6 times. Interval check. Eating ok. Mouth is healing from the leucovorin to lessen the effect of mtx on mouth.  Getting guidance from Myerson.  ===================================== 10/6/21  57 yo female with Crohn's and avascular necrosis and saw Dr. Myerson 2 days ago and he did blood work has some plans - f/u in 3 weeks.  Sees Dr. Gardiner tomorrow to address major ostomy issues.  Considering ostomy revision. Retracted stoma.  Saw her mother and needed more supplies due to leaking.  Entocort now for 10 weeks, 6 mg a day, empties ostomy 6-9 times.  Has leaked but not in recent weeks.  Never on 9 mg and capri 6 mg for 10 weeks.  Try alternating 6 mg and 3 mg on alternate days.    ========================  21  57 yo female with CD now for 1 month f/u after telehealth and doing better 5 weeks into course of Entocort tx.  Does havd avascular necrosis of hips and understands there are risks of similar complications with Entocort therapy.  Was on fosamax. I just called Dr. Myerson to arrange an appoinment.  Ostomy - empties it 6 times so far today. Occasional abdominal pain.  On Entyvio every 4 weeks Entocort 9 mg a day x 5 weeks.  Asked to have CD4/CD8 checked, by sister's immunologist.   ===================== 8/3/21  57 yo female for  f/u due to beeing weak and in bed. In ER last week and they sent her home.  Meds: Entyvio 300 mg q 4 weeks Entocort x 1 week Off prednisone - had been on prednisone- she does not think it is what was helping her. On 1 cc mtx per week.  Devin Auguset is primary MD Saw him a month ago.  Main syx now.5 days ago was throwing up and was so distended. Could not poke her stomach. went to the ER Wednesday. N/V. Aciphex 1 twice daily. Last looked Never had an ulcer. Ileostomy in place.  Fever on Friday, up to 101. Eating now. Hungry yesterday  Not vomiting.  May neede EGD.     ====================== 21  56 yo female comes in for f/u.  She is "off" per Dr. Jaquez. She did an MRI. Dr. Jaquez reportedly thinks it is her meds. Has not seen a neurologist in years. Recent  labs were nl mostly with Hb 10.1, nl K-4.0 and Ca. Nl ESR and CRP, but had just finished medrol dose eliza.  Wants a Mg level. Labs to be sent to [facility]. Nl quantiferon.  Currently , getting Entyvio every 4 weeks.  Getting entyvio every 4 weeks. Taking 2 weeks off mtx due to stomatitis. If resuming, would reduce from 1 cc to .5 cc..  She is not sure how much she needs mtx and how much it has helped in the past. Mtx has very likely caused the stomatis. Or the pred medrol could have helped the mouth and belly. She has avn all over.  Myerson will see her and order bone scan  Addendum ------21 Has thicker stools Has to change her bag because of high outputs She says her stoma is an outie  She is on pred 20 mg and should taper off it by going to 10 mg to 5 mg for a week and then d/c Risks of prednisone discussed.  Had dropped mtx from 25 to 12.5 and will go back up to 25 mg a week. Consider [place] referral if not improvimg at that point. ============ 21  56 yo female with CD and ostomy for 5 month f/u.  Abd is good. Ostomy is functioning. Sleep thru much of night.  Appetite and intake is better. Oral CD problematic. Has been in touch with Dr. Pepe ---- swish and spit.  Max-- 150. Current weight is 121.  Not taking supplements -- Boost and Ensure. 2-3 cans The y don't agree with her.. R Lactose free ice  She wants low dose prednisone. Lets try 2.5-5 mg a day.  ================== 20  56 yo female for 3 week TH f/u. Has upper abdominal pain that may be due to gallstones. Had referred her to Dr. Moreland but she did not call. She heard he was retiring and not. Has abdominal pain when she. Left upper quadrant or "upper rib cage" mostly left. He knows Dr. Talamantes. - Frustrated---- blisters on lip. Torn open. Has not tried steroid rinse or paste in past. Magic mouth wash helps. Does not reduce the blisters. Dr. Best- non steroid options for mouth   ======================== 10/23/20  10/23 56 yo female for 3 week TH f/u. Has oral Crohn's and I-C and stoma issues. Entyvio may not cover the EIM. Might try oral steroid lozenge --- pred paste and lozenges.  Entyvio 300 q 4 weeks Mtx 1 cc q week Pred 5 mg may stop. Bentyl -  Abd pain is ok No idea what triggered her admission Food induced -  Consider medrol dose eliza in the future  Extensive lab review CBC --- Hct 31.6/Hb 10.9 plt 120 CRP 22 ESR 56 Iron 33% Ferrintin good ================================================== 10/2/20  56 yo female with Crohn's disease recently hosp 3 days at  for abd pain, N/V and got better with 3 dasy 3 days of iv steroids.  Surgery considered.  Was on 40 mg pred - intolerant anxiety  Now dropped to 30 mg a day. Now is 50-60% better than admission.  Surgery debated gallstones. They leaned toward blockage.  Has been on Stelara. Entyvio works better than Stelara. Mtx makes her tired She would like to up the dose of lomotil.  20  53 yo female with Crohnn's disease having continued active syx. Getting Entyvio every 4 weeks. Methotrexate 12.5 mg per week Dicyclomine tablets 2 po qid  == She has taken Xifaxan and it tears her up. Then stop it. Does not like flagyl and does not help.  Pepto 1-2 doses a day, does help the diarreha. She uses Immodium every day.  Stooling ----- empties bag 6-10 times a day. Abdominal pain - when she eats and intermittent. Energy - gone.  Labs- Anemia - Hb lower CRP is higher ---34 (was 16).   ========================== 20  Crohn's disease.   53 yo female with Crohn's disease comes in for f/u.  Dr. Jaquez is considering a GYN etiology.  Needs a pouch exam.  Getting Entyvio every 4 weeks.  Stelara did not work as well as Entyvio.  Normally when she gets an infusion, she gets a bounce and has not.  Prometheus Vedo level 3/25, just before 4 week dose was great_ __ _32.5 and no antibodies.  Feb 10 labs looked good CRP 29.9 and cbc looked fine. ESR elevation.  2 liters of miralax_ __ _ Try 6-8am _ __ _- and then an exam at 2-3 pm She asked about a pilonidal cyst and I directed to Dr. Talamantes  Xifaxan  Try pepto bismol 1-2 prn on days.    ===== 20  53 yo female comes in at my request for 3 week f/u of Crohn's due to new 4 x eleation of CRP at 29.9.  19 CRP was similarly elevated 4x at 2.1/.5  Leonid is checking CT abd-pelvis due to pelvic pain which is not characteristic of her Crohn's.  She is wondering about GYN. Or spastic bladder.  Dr. Talamantes thought stable but wants to scope to be sure.  Empties bag 10x a day.That is more than 3-6 months ago and has leakage of the ostomy bag. Maybe more volume. Very watery.  Review of labs from 2/10 show cRP 29.i9 but others are stable. CBC looks good with trace elevation of wbc and borderline anemia. Getting iv iron from Arianna Jaquez. Vit D is normal at 39. Albumin is normal at 4.5.  MRI 10/1/19 showed gallstones , present in past, and she has mild pain that is intermittent Liver tests are nl. Can assess with HIDA, and/or with surgical consult, e.g ???relative need for choly, high vs. low.    ================ 20  53 yo female with severe Crohn's disease and h/o breast cancer _ _ Arianna Jaquez MD on Letrasol. 10th year.  Abdominal pain is confusing. Has gotten pain in the ovary and in the uterus. GYN-Ann Laird MD and did US was fine. Sent to PT for pelvic treatment.  At her comment, I reviewed [facility] imaging 16 nl CTE 5/3/17 nl GB US Oct 2 2019 "Cholelithiasis" o/w MRE was nl - no inflammation. BUT THE MRI DID SHOW "PERSISTENT TETHERING OF UTERUS TO RECTAL STUMP"  On Entyvio 300 mg q 4 weeks. Next dose in 2 weeks On mtx _ _.5 cc. Monthly b12 shots  Eating ok, some weight loss. Weight stable x 6 months and down 30 lb since _ __ _- she is comfortable with current weight.  ============= 19  53 yo female with severe Crohn's and h/o breast cancer comes in for 3 month f/u.  Doing poorly.  Daily abd pain which is intermittent.  When she eats she gets pain and nausea.  Taking zofran and phenergan.  Sleep pattern is awry.  Empties bag 5 times a day and going out of rectum.  Has a loop ileostomy that may be problematic.  Allergic to tape.  Currently on Entyvio 300 mg q 8 weeks.  Stelara did not work as well.  Discussed the entyvio at shorter cycles of q 4 weeks to see if that will control syx better.  Outputs are higher and she may be volume depleted and maybe more entyvio will help.  last entyvio was last week and so a level won't be useful today. Will just proceed with dose increase.    ============== 18 51 yo female with Crohn's disease and breast cancer and diverting ileostomy from Dr. Talamantes.  Skin issue - addressed by Dr. Best.  See Brooke Talamantes MD and enterostomal therapist.  AGWS.  No fcs.     Review of Systems ConstitutionalDenies : body aches, fever, weight gain, weight loss CardiovascularDenies : chest pain, heart racing/skipping, high blood pressure RespiratoryDenies : chronic cough, shortness of breath, wheezing or asthma symptoms GastrointestinalDenies : Abdominal Pain/discomfort, Anal/Rectal Pain or Itching, Anal Spasm, Black Stool, Bloating/belching/gaseouness, Change of Bowel Habit, Constipation, Diarrhea/Loose Stool, Difficulty in Swallowing, Heartburn/esophageal reflux, Hemorrhoids, Indigestion, Mucus in Stool, Nausea/vomiting, Rectal Bleeding, Unintentional Weight Loss   Vitals Date Time BP Position Site L\R Cuff Size HR RR TEMP (F) WT HT BMI kg/m2 BSA m2 O2 Sat HC  2020 05:15 PM 133lbs 0oz 5' 8.5" 19.93 1.71       Assessment Crohn's Disease 555.2  Diarrhea 787.91/R19.7  Cholelithiasis 574.20/K80.20  Immunosuppression due to drug therapy V58.69/Z79.899   Problems Reconciled Plan OrdersPatient not identified as an unhealthy alcohol user when screene () - - 2020 Influenza immunization administered or previously received () - - 2020 BMI screening above normal () - - 2020 Current tobacco non-user (CAD, cap, COPD, PV) (dm) (IBD) (1036F, ) - - 2020 Colorectal cancer screening results documented and reviewed (PV) (3017F) - - 2020 Documentation of current medications. () - - 2020 EGD w/Biopsy if indicated (22759) - - 2020 Pouchoscopy (42246) - - 2020 CMP (comprehensive metabolic panel) (24523) - - 2020 Sed rate (31285) - - 2020 C-reactive protein (27770) - - 2020 Ferritin assay (17644) - - 2020 Iron + iron binding capacity panel ser/plas (84359, 73896) - - 2020 Vitamin B12 and folate measurement (05817, 06414) - - 2020 CBC with diff (15261) - - 2020 25-OH-Vitamin D (44491) - - 2020 GPP 22 (FilmArray) - Gastrointestinal Pathogen Panel - QDx (14519) - - 2020 MedicationsMedications have been Reconciled Transition of Care or Provider Policy InstructionsPatient seen today via telehealth by agreement and consent of patient in light of current COVID-19 pandemic. I used video conferencing during the visit. The patient encounter is appropriate and reasonable under the circumstances given the patient's particular presentation at this time. The patient has been advised of the followin) the potential risks and limitations of this mode of treatment (including but not limited to the absence of in-person examination); 2) the right to refuse telehealth services at any point without affecting the right to future care; 3) the right to receive in-person services, included immediately after this consultation if an urgent need arises; 4) information, including identifiable images or information from this telehealth consult, will only be shared in accordance with HIPPA regulations. Any and all of the patient's and/or patient's family member's questions on this issue have been answered. The patient has verbally consented to be treated via telehealth services. The patient has also been advised to contact this office for worsening conditions or problems, and seek emergency medical treatment and/or call 911 if the patient deems either necessary. (For commercial payers, telehealth video only) More than half of the face-to-face time used for counseling and coordination of care. 40 min appointment (95271 EP) I have documented a list of current medications and reviewed it with the patient. I encouraged the patient to diet and exercise. DispositionReturn To Clinic in 2 Months CorrespondenceCC this document (Slade Auguste MD, Brooke Talamantes MD, Arianna Jaquez MD) - 2020  70333 exam med 32 min time  needs refill or lomotil Can't eRx lomotil.  Aciphex refill.  Needs EGD and pouchoscopy        Electronically Signed by: Sánchez Perez MD -A  cc: note to Dr. Brooke Talamantes 23

## 2024-04-17 ENCOUNTER — OV EP (OUTPATIENT)
Dept: URBAN - METROPOLITAN AREA CLINIC 98 | Facility: CLINIC | Age: 59
End: 2024-04-17

## 2024-05-08 NOTE — PHYSICAL EXAM MUSCULOSKELETAL:
normal gait and station, no deformities Price (Do Not Change): 0.00 Detail Level: Simple Instructions: This plan will send the code FBSE to the PM system.  DO NOT or CHANGE the price.

## 2024-05-28 ENCOUNTER — TELEPHONE ENCOUNTER (OUTPATIENT)
Dept: URBAN - METROPOLITAN AREA CLINIC 98 | Facility: CLINIC | Age: 59
End: 2024-05-28

## 2024-05-28 ENCOUNTER — LAB OUTSIDE AN ENCOUNTER (OUTPATIENT)
Dept: URBAN - METROPOLITAN AREA CLINIC 98 | Facility: CLINIC | Age: 59
End: 2024-05-28

## 2024-05-28 RX ORDER — LIDOCAINE HYDROCHLORIDE 20 MG/ML
SWISH AND SPIT 15ML BY MOUTH THREE TIMES A DAY SOLUTION ORAL; TOPICAL
Qty: 800 MILLILITER | Refills: 2 | Status: DISCONTINUED | COMMUNITY

## 2024-05-28 RX ORDER — VITAMIN A 2400 MCG
1 TABLET CAPSULE ORAL ONCE A DAY
Qty: 90 TABLET | Refills: 1 | Status: ACTIVE | COMMUNITY
Start: 2022-01-12

## 2024-05-28 RX ORDER — RABEPRAZOLE SODIUM 20 MG/1
1 TABLET TABLET, DELAYED RELEASE ORAL ONCE A DAY
Qty: 90 TABLET | Refills: 3 | Status: DISCONTINUED | COMMUNITY
Start: 2023-06-09

## 2024-05-28 RX ORDER — DIPHENOXYLATE HYDROCHLORIDE AND ATROPINE SULFATE 2.5; .025 MG/1; MG/1
TAKE 2 TABLETS TABLET ORAL TWICE DAILY
Qty: 120 TABLETS | Refills: 5 | Status: ACTIVE | COMMUNITY
Start: 2023-08-08 | End: 2024-07-15

## 2024-05-28 RX ORDER — ONDANSETRON HYDROCHLORIDE 8 MG/1
1 TABLET AS NEEDED TABLET, FILM COATED ORAL TWICE DAILY
Qty: 60 TABLETS | Refills: 5 | Status: ACTIVE | COMMUNITY
Start: 2024-01-22

## 2024-05-28 RX ORDER — RISANKIZUMAB-RZAA 360 MG/2.4
360 MG WEARABLE INJECTOR SUBCUTANEOUS
Qty: 1 KIT | Refills: 8 | Status: ACTIVE | COMMUNITY
Start: 2022-10-19 | End: 2025-04-04

## 2024-05-28 RX ORDER — PROMETHAZINE HYDROCHLORIDE 6.25 MG/5ML
TAKE 5 ML BY MOUTH AT BEDTIME SOLUTION ORAL
Qty: 150 ML | Refills: 11 | Status: ACTIVE | COMMUNITY

## 2024-05-28 RX ORDER — METHOTREXATE 25 MG/ML
INJECT 1 ML INTO THE MUSCLE EVERY 7 DAYS INJECTION, SOLUTION INTRA-ARTERIAL; INTRAMUSCULAR; INTRATHECAL; INTRAVENOUS
Qty: 4 VIALS | Refills: 6 | Status: ACTIVE | COMMUNITY

## 2024-05-28 RX ORDER — ONDANSETRON 8 MG/1
1 TABLET ON THE TONGUE AND ALLOW TO DISSOLVE AS NEEDED TABLET, ORALLY DISINTEGRATING ORAL ONCE A DAY
Qty: 30 TABLETS | Refills: 11 | Status: ACTIVE | COMMUNITY
Start: 2024-01-24

## 2024-05-28 RX ORDER — RABEPRAZOLE SODIUM 20 MG/1
TAKE 1 TABLET BY MOUTH TWICE DAILY TABLET, DELAYED RELEASE ORAL
Qty: 180 | Refills: 3 | Status: ACTIVE | COMMUNITY

## 2024-05-28 RX ORDER — IBUPROFEN 100 MG/1
TAKE 2 TABLETS (200 MG) BY ORAL ROUTE EVERY 6 HOURS AS NEEDED WITH FOOD TABLET, COATED ORAL
Qty: 0 | Refills: 0 | Status: ACTIVE | COMMUNITY
Start: 1900-01-01

## 2024-05-28 RX ORDER — OMEPRAZOLE 40 MG/1
1 CAPSULE 30 MINUTES BEFORE MORNING MEAL CAPSULE, DELAYED RELEASE ORAL ONCE A DAY
Qty: 90 CAPSULES | Refills: 3 | Status: DISCONTINUED | COMMUNITY
Start: 2023-04-05

## 2024-05-28 RX ORDER — PROMETHAZINE HYDROCHLORIDE 25 MG/1
TAKE 1 TABLETS TABLET ORAL BID
Qty: 60 TABLETS | Refills: 5 | Status: ACTIVE | COMMUNITY
Start: 2021-02-12

## 2024-05-28 RX ORDER — IBUPROFEN 200 MG/1
TAKE 1 CAPSULE (200 MG) BY ORAL ROUTE EVERY 6 HOURS AS NEEDED CAPSULE, LIQUID FILLED ORAL
Qty: 0 | Refills: 0 | Status: ACTIVE | COMMUNITY
Start: 1900-01-01

## 2024-05-28 RX ORDER — LETROZOLE TABLETS 2.5 MG/1
TAKE 1 TABLET (2.5 MG) BY ORAL ROUTE ONCE DAILY TABLET, FILM COATED ORAL 1
Qty: 0 | Refills: 0 | Status: ACTIVE | COMMUNITY
Start: 1900-01-01

## 2024-05-28 RX ORDER — RABEPRAZOLE SODIUM 20 MG/1
1 TABLET TABLET, DELAYED RELEASE ORAL ONCE A DAY
Qty: 90 TABLET | Refills: 3 | Status: ACTIVE | COMMUNITY
Start: 2023-06-09

## 2024-05-28 RX ORDER — PREDNISONE 10 MG/1
TAKE 40 MG DAILY TABLET ORAL ONCE A DAY
Status: ACTIVE | COMMUNITY

## 2024-05-28 RX ORDER — ALENDRONATE SODIUM 70 MG
TABLET ORAL
Qty: 0 | Refills: 0 | Status: ACTIVE | COMMUNITY
Start: 1900-01-01

## 2024-05-28 RX ORDER — DICYCLOMINE HYDROCHLORIDE 20 MG/1
TAKE TWO TABLETS BY MOUTH FOUR TIMES A DAY FOR 30 DAYS TABLET ORAL
Qty: 240 TABLET | Refills: 11 | Status: ACTIVE | COMMUNITY

## 2024-05-28 RX ORDER — RABEPRAZOLE SODIUM 20 MG/1
TAKE 1 TABLET TABLET, DELAYED RELEASE ORAL TWICE DAILY
Qty: 180 TABLETS | Refills: 6 | Status: DISCONTINUED | COMMUNITY
Start: 2019-02-17

## 2024-05-28 RX ORDER — ATOGEPANT 10 MG/1
1 TABLET TABLET ORAL ONCE A DAY
Status: ACTIVE | COMMUNITY

## 2024-05-31 ENCOUNTER — TELEPHONE ENCOUNTER (OUTPATIENT)
Dept: URBAN - METROPOLITAN AREA CLINIC 98 | Facility: CLINIC | Age: 59
End: 2024-05-31

## 2024-05-31 RX ORDER — DIPHENOXYLATE HYDROCHLORIDE AND ATROPINE SULFATE 2.5; .025 MG/1; MG/1
1 TABLET AS NEEDED TABLET ORAL
OUTPATIENT
Start: 2024-05-31

## 2024-06-12 ENCOUNTER — TELEPHONE ENCOUNTER (OUTPATIENT)
Dept: URBAN - METROPOLITAN AREA CLINIC 98 | Facility: CLINIC | Age: 59
End: 2024-06-12

## 2024-06-12 ENCOUNTER — OFFICE VISIT (OUTPATIENT)
Dept: URBAN - METROPOLITAN AREA CLINIC 98 | Facility: CLINIC | Age: 59
End: 2024-06-12

## 2024-06-12 ENCOUNTER — LAB OUTSIDE AN ENCOUNTER (OUTPATIENT)
Dept: URBAN - METROPOLITAN AREA CLINIC 98 | Facility: CLINIC | Age: 59
End: 2024-06-12

## 2024-06-12 ENCOUNTER — OFFICE VISIT (OUTPATIENT)
Dept: URBAN - METROPOLITAN AREA CLINIC 98 | Facility: CLINIC | Age: 59
End: 2024-06-12
Payer: COMMERCIAL

## 2024-06-12 ENCOUNTER — DASHBOARD ENCOUNTERS (OUTPATIENT)
Age: 59
End: 2024-06-12

## 2024-06-12 VITALS — BODY MASS INDEX: 19.55 KG/M2 | WEIGHT: 132 LBS | HEIGHT: 69 IN

## 2024-06-12 DIAGNOSIS — E44.0 MODERATE PROTEIN-CALORIE MALNUTRITION: ICD-10-CM

## 2024-06-12 DIAGNOSIS — Z93.2 ILEOSTOMY IN PLACE: ICD-10-CM

## 2024-06-12 DIAGNOSIS — E83.42 HYPOMAGNESEMIA: ICD-10-CM

## 2024-06-12 DIAGNOSIS — R63.4 WEIGHT LOSS: ICD-10-CM

## 2024-06-12 DIAGNOSIS — K50.80 CROHN'S DISEASE OF BOTH SMALL AND LARGE INTESTINE: ICD-10-CM

## 2024-06-12 DIAGNOSIS — K50.90 CROHN DISEASE: ICD-10-CM

## 2024-06-12 PROCEDURE — 99215 OFFICE O/P EST HI 40 MIN: CPT | Performed by: INTERNAL MEDICINE

## 2024-06-12 RX ORDER — LEUCOVORIN CALCIUM 10 MG/1
1 TABLET TABLET ORAL ONCE A DAY
Status: ACTIVE | COMMUNITY

## 2024-06-12 RX ORDER — ONDANSETRON HYDROCHLORIDE 8 MG/1
1 TABLET AS NEEDED TABLET, FILM COATED ORAL TWICE DAILY
Qty: 60 TABLETS | Refills: 5 | Status: ACTIVE | COMMUNITY
Start: 2024-01-22

## 2024-06-12 RX ORDER — DICYCLOMINE HYDROCHLORIDE 20 MG/1
TAKE TWO TABLETS BY MOUTH FOUR TIMES A DAY FOR 30 DAYS TABLET ORAL
Qty: 240 TABLET | Refills: 11 | Status: ACTIVE | COMMUNITY

## 2024-06-12 RX ORDER — METHOCARBAMOL 500 MG/1
1.5 TABLETS TABLET ORAL
Status: ACTIVE | COMMUNITY

## 2024-06-12 RX ORDER — ONDANSETRON 8 MG/1
1 TABLET ON THE TONGUE AND ALLOW TO DISSOLVE AS NEEDED TABLET, ORALLY DISINTEGRATING ORAL ONCE A DAY
Qty: 30 TABLETS | Refills: 11 | Status: ACTIVE | COMMUNITY
Start: 2024-01-24

## 2024-06-12 RX ORDER — SULFASALAZINE 500 MG/1
2 TABLETS TABLET ORAL TWICE DAILY
Status: ACTIVE | COMMUNITY

## 2024-06-12 RX ORDER — RISANKIZUMAB-RZAA 360 MG/2.4
360 MG WEARABLE INJECTOR SUBCUTANEOUS
Qty: 1 KIT | Refills: 8 | Status: ACTIVE | COMMUNITY
Start: 2022-10-19 | End: 2025-04-04

## 2024-06-12 RX ORDER — DIPHENOXYLATE HYDROCHLORIDE AND ATROPINE SULFATE 2.5; .025 MG/1; MG/1
TAKE 2 TABLETS TABLET ORAL TWICE DAILY
Qty: 120 TABLETS | Refills: 5 | Status: ACTIVE | COMMUNITY
Start: 2023-08-08 | End: 2024-07-15

## 2024-06-12 RX ORDER — VITAMIN A 2400 MCG
1 TABLET CAPSULE ORAL ONCE A DAY
Qty: 90 TABLET | Refills: 1 | Status: ACTIVE | COMMUNITY
Start: 2022-01-12

## 2024-06-12 RX ORDER — FOLIC ACID 1 MG/1
1 TABLET TABLET ORAL ONCE A DAY
Status: ACTIVE | COMMUNITY

## 2024-06-12 RX ORDER — RABEPRAZOLE SODIUM 20 MG/1
TAKE 1 TABLET BY MOUTH TWICE DAILY TABLET, DELAYED RELEASE ORAL
Qty: 180 | Refills: 3 | Status: ACTIVE | COMMUNITY

## 2024-06-12 RX ORDER — DIPHENOXYLATE HYDROCHLORIDE AND ATROPINE SULFATE 2.5; .025 MG/1; MG/1
1 TABLET AS NEEDED TABLET ORAL
Status: ACTIVE | COMMUNITY
Start: 2024-05-31

## 2024-06-12 RX ORDER — ALENDRONATE SODIUM 70 MG
TABLET ORAL
Qty: 0 | Refills: 0 | Status: ACTIVE | COMMUNITY
Start: 1900-01-01

## 2024-06-12 RX ORDER — OXYCODONE HYDROCHLORIDE AND ACETAMINOPHEN 7.5; 325 MG/1; MG/1
1 TABLET AS NEEDED TABLET ORAL
Refills: 0 | Status: ACTIVE | COMMUNITY

## 2024-06-12 RX ORDER — PROMETHAZINE HYDROCHLORIDE 25 MG/1
TAKE 1 TABLETS TABLET ORAL BID
Qty: 60 TABLETS | Refills: 5 | Status: ACTIVE | COMMUNITY
Start: 2021-02-12

## 2024-06-12 RX ORDER — LETROZOLE TABLETS 2.5 MG/1
TAKE 1 TABLET (2.5 MG) BY ORAL ROUTE ONCE DAILY TABLET, FILM COATED ORAL 1
Qty: 0 | Refills: 0 | Status: ACTIVE | COMMUNITY
Start: 1900-01-01

## 2024-06-12 RX ORDER — METHOTREXATE 25 MG/ML
INJECT 1 ML INTO THE MUSCLE EVERY 7 DAYS INJECTION, SOLUTION INTRA-ARTERIAL; INTRAMUSCULAR; INTRATHECAL; INTRAVENOUS
Qty: 4 VIALS | Refills: 6 | Status: DISCONTINUED | COMMUNITY

## 2024-06-12 RX ORDER — METHOTREXATE 25 MG/ML
INJECT 1 ML INTO THE MUSCLE EVERY 7 DAYS INJECTION, SOLUTION INTRA-ARTERIAL; INTRAMUSCULAR; INTRATHECAL; INTRAVENOUS
Qty: 4 VIALS | Refills: 6 | Status: ACTIVE | COMMUNITY

## 2024-06-12 RX ORDER — IBUPROFEN 200 MG/1
TAKE 1 CAPSULE (200 MG) BY ORAL ROUTE EVERY 6 HOURS AS NEEDED CAPSULE, LIQUID FILLED ORAL
Qty: 0 | Refills: 0 | Status: ACTIVE | COMMUNITY
Start: 1900-01-01

## 2024-06-12 RX ORDER — LOPERAMIDE HCL 2 MG
1 CAPSULE AS NEEDED CAPSULE ORAL
Status: ACTIVE | COMMUNITY

## 2024-06-12 RX ORDER — ATOGEPANT 10 MG/1
1 TABLET TABLET ORAL ONCE A DAY
Status: ACTIVE | COMMUNITY

## 2024-06-12 RX ORDER — PROMETHAZINE HYDROCHLORIDE 6.25 MG/5ML
TAKE 5 ML BY MOUTH AT BEDTIME SOLUTION ORAL
Qty: 150 ML | Refills: 11 | Status: ACTIVE | COMMUNITY

## 2024-06-12 RX ORDER — IBUPROFEN 100 MG/1
TAKE 2 TABLETS (200 MG) BY ORAL ROUTE EVERY 6 HOURS AS NEEDED WITH FOOD TABLET, COATED ORAL
Qty: 0 | Refills: 0 | Status: ACTIVE | COMMUNITY
Start: 1900-01-01

## 2024-06-12 RX ORDER — PREDNISONE 10 MG/1
TAKE 40 MG DAILY TABLET ORAL ONCE A DAY
Status: ACTIVE | COMMUNITY

## 2024-06-12 NOTE — PHYSICAL EXAM MUSCULOSKELETAL:
Problem: Depressive Symptoms  Goal: #Verbalizes understanding of depressive symptoms management  Description: Document in Patient Education Activity  Outcome: Outcome Met, Continue evaluating goal progress toward completion     Problem: Pain  Goal: #Acceptable pain level achieved/maintained at rest using NRS/Faces  Description: This goal is used for patients who can self-report.  Acceptable means the level is at or below the identified comfort/function goal.  Outcome: Outcome Met, Continue evaluating goal progress toward completion  Goal: # Verbalizes understanding of pain management  Description: Documented in Patient Education Activity  Outcome: Outcome Met, Continue evaluating goal progress toward completion      normal gait and station, no deformities

## 2024-06-20 ENCOUNTER — TELEPHONE ENCOUNTER (OUTPATIENT)
Dept: URBAN - METROPOLITAN AREA CLINIC 98 | Facility: CLINIC | Age: 59
End: 2024-06-20

## 2024-07-01 ENCOUNTER — TELEPHONE ENCOUNTER (OUTPATIENT)
Dept: URBAN - METROPOLITAN AREA CLINIC 98 | Facility: CLINIC | Age: 59
End: 2024-07-01

## 2024-07-11 ENCOUNTER — TELEPHONE ENCOUNTER (OUTPATIENT)
Dept: URBAN - METROPOLITAN AREA CLINIC 96 | Facility: CLINIC | Age: 59
End: 2024-07-11

## 2024-07-16 ENCOUNTER — TELEPHONE ENCOUNTER (OUTPATIENT)
Dept: URBAN - METROPOLITAN AREA CLINIC 98 | Facility: CLINIC | Age: 59
End: 2024-07-16

## 2024-07-24 ENCOUNTER — TELEPHONE ENCOUNTER (OUTPATIENT)
Dept: URBAN - METROPOLITAN AREA CLINIC 98 | Facility: CLINIC | Age: 59
End: 2024-07-24

## 2024-07-24 ENCOUNTER — OFFICE VISIT (OUTPATIENT)
Dept: URBAN - METROPOLITAN AREA TELEHEALTH 2 | Facility: TELEHEALTH | Age: 59
End: 2024-07-24
Payer: COMMERCIAL

## 2024-07-24 VITALS — HEIGHT: 69 IN | WEIGHT: 132 LBS | BODY MASS INDEX: 19.55 KG/M2

## 2024-07-24 DIAGNOSIS — Z93.2 ILEOSTOMY IN PLACE: ICD-10-CM

## 2024-07-24 DIAGNOSIS — R19.7 DIARRHEA: ICD-10-CM

## 2024-07-24 DIAGNOSIS — K50.80 CROHN'S DISEASE OF BOTH SMALL AND LARGE INTESTINE: ICD-10-CM

## 2024-07-24 DIAGNOSIS — E83.42 HYPOMAGNESEMIA: ICD-10-CM

## 2024-07-24 PROCEDURE — 99215 OFFICE O/P EST HI 40 MIN: CPT | Performed by: INTERNAL MEDICINE

## 2024-07-24 RX ORDER — RISANKIZUMAB-RZAA 360 MG/2.4
360 MG WEARABLE INJECTOR SUBCUTANEOUS
Qty: 1 KIT | Refills: 8 | Status: ACTIVE | COMMUNITY
Start: 2022-10-19 | End: 2025-04-04

## 2024-07-24 RX ORDER — ALENDRONATE SODIUM 70 MG
TABLET ORAL
Qty: 0 | Refills: 0 | Status: ACTIVE | COMMUNITY
Start: 1900-01-01

## 2024-07-24 RX ORDER — DIPHENOXYLATE HYDROCHLORIDE AND ATROPINE SULFATE 2.5; .025 MG/1; MG/1
1 TABLET AS NEEDED TABLET ORAL
Status: ACTIVE | COMMUNITY
Start: 2024-05-31

## 2024-07-24 RX ORDER — LETROZOLE TABLETS 2.5 MG/1
TAKE 1 TABLET (2.5 MG) BY ORAL ROUTE ONCE DAILY TABLET, FILM COATED ORAL 1
Qty: 0 | Refills: 0 | Status: ACTIVE | COMMUNITY
Start: 1900-01-01

## 2024-07-24 RX ORDER — LOPERAMIDE HCL 2 MG
1 CAPSULE AS NEEDED CAPSULE ORAL
Status: ACTIVE | COMMUNITY

## 2024-07-24 RX ORDER — DICYCLOMINE HYDROCHLORIDE 20 MG/1
TAKE 2 TABLETS TABLET ORAL
Qty: 240 TABLETS | Refills: 5 | OUTPATIENT
Start: 2024-07-24 | End: 2025-01-19

## 2024-07-24 RX ORDER — IBUPROFEN 200 MG/1
TAKE 1 CAPSULE (200 MG) BY ORAL ROUTE EVERY 6 HOURS AS NEEDED CAPSULE, LIQUID FILLED ORAL
Qty: 0 | Refills: 0 | Status: ACTIVE | COMMUNITY
Start: 1900-01-01

## 2024-07-24 RX ORDER — OXYCODONE HYDROCHLORIDE AND ACETAMINOPHEN 7.5; 325 MG/1; MG/1
1 TABLET AS NEEDED TABLET ORAL
Refills: 0 | Status: ACTIVE | COMMUNITY

## 2024-07-24 RX ORDER — PROMETHAZINE HYDROCHLORIDE 25 MG/1
TAKE 1 TABLETS TABLET ORAL BID
Qty: 60 TABLETS | Refills: 5 | Status: ACTIVE | COMMUNITY
Start: 2021-02-12

## 2024-07-24 RX ORDER — SULFASALAZINE 500 MG/1
2 TABLETS TABLET ORAL TWICE DAILY
Status: ACTIVE | COMMUNITY

## 2024-07-24 RX ORDER — LEUCOVORIN CALCIUM 10 MG/1
1 TABLET TABLET ORAL ONCE A DAY
Status: ACTIVE | COMMUNITY

## 2024-07-24 RX ORDER — PREDNISONE 10 MG/1
TAKE 40 MG DAILY TABLET ORAL ONCE A DAY
Status: ACTIVE | COMMUNITY

## 2024-07-24 RX ORDER — VITAMIN A 2400 MCG
1 TABLET CAPSULE ORAL ONCE A DAY
Qty: 90 TABLET | Refills: 1 | Status: ACTIVE | COMMUNITY
Start: 2022-01-12

## 2024-07-24 RX ORDER — METHOCARBAMOL 500 MG/1
1.5 TABLETS TABLET ORAL
Status: ACTIVE | COMMUNITY

## 2024-07-24 RX ORDER — FOLIC ACID 1 MG/1
1 TABLET TABLET ORAL ONCE A DAY
Status: ACTIVE | COMMUNITY

## 2024-07-24 RX ORDER — ATOGEPANT 10 MG/1
1 TABLET TABLET ORAL ONCE A DAY
Status: ACTIVE | COMMUNITY

## 2024-07-24 RX ORDER — PROMETHAZINE HYDROCHLORIDE 6.25 MG/5ML
TAKE 5 ML BY MOUTH AT BEDTIME SOLUTION ORAL
Qty: 150 ML | Refills: 11 | Status: ACTIVE | COMMUNITY

## 2024-07-24 RX ORDER — ONDANSETRON HYDROCHLORIDE 8 MG/1
1 TABLET AS NEEDED TABLET, FILM COATED ORAL TWICE DAILY
Qty: 60 TABLETS | Refills: 5 | Status: ACTIVE | COMMUNITY
Start: 2024-01-22

## 2024-07-24 RX ORDER — DICYCLOMINE HYDROCHLORIDE 20 MG/1
TAKE TWO TABLETS BY MOUTH FOUR TIMES A DAY FOR 30 DAYS TABLET ORAL
Qty: 240 TABLET | Refills: 11 | Status: ACTIVE | COMMUNITY

## 2024-07-24 RX ORDER — RABEPRAZOLE SODIUM 20 MG/1
TAKE 1 TABLET BY MOUTH TWICE DAILY TABLET, DELAYED RELEASE ORAL
Qty: 180 | Refills: 3 | Status: ACTIVE | COMMUNITY

## 2024-07-24 RX ORDER — IBUPROFEN 100 MG/1
TAKE 2 TABLETS (200 MG) BY ORAL ROUTE EVERY 6 HOURS AS NEEDED WITH FOOD TABLET, COATED ORAL
Qty: 0 | Refills: 0 | Status: ACTIVE | COMMUNITY
Start: 1900-01-01

## 2024-07-24 RX ORDER — ONDANSETRON 8 MG/1
1 TABLET ON THE TONGUE AND ALLOW TO DISSOLVE AS NEEDED TABLET, ORALLY DISINTEGRATING ORAL ONCE A DAY
Qty: 30 TABLETS | Refills: 11 | Status: ACTIVE | COMMUNITY
Start: 2024-01-24

## 2024-07-24 NOTE — HPI-TODAY'S VISIT:
58 yo female with severe Crohn's disease and likely shotr gut  is too sick for today's scheduled ov and is seen by telehealth.  Originally, she thought her low back needed surgery and there was possibly an abscess. [facility] admission 7/10- --admitted due to 1 week of back pain and found an infection due to her port which was removed. Getting high dose antibiotics-- Dr. Wilcox - ID. Many cx. Very little liquid from aspiration. Given iv vancomycin. Initially thought to have acute on chronic discitis/osteomyelitis involving L1 and L2. Soft tissue changes around the L5S1 vertebra. Spondylosis Gallstones  Crohn's disease is stable. Empties ostomy 8-10 times - stable. That is with Lomotil-6 4x a day. and immodium - 4 4x a day. Tried tincture of opium and it did not help. Discussed dicyclomine - to slow the gut-- she takes 3 Could increase dicyclomine to 2 qid - caution but might watch for side effects. Will check stool tests and leave the rest to others. re:TPN, she does not want it every day but qod.  ========================= 24  58 yo female returns for 3 month f/u visit.  She says she feels stable from a GI standpoint.  Juanita and I spoke 1.5 weeks ago about her nausea and I suggested stopping sulfasalazine rx by Dr. Mcdaniel and it resolved the nausea. Has joint aches of her right more than left hand.  Terrell, Hips and knees and feet.  CD meds: Skyrizia 360 mg q 8 weeks (??better than entyvio - blood work factor) dicyclomine no aza/6mp or mtx.  Stoma out puts 8-10 per day. lomotil 6 tabs qid and immodium 4 tab qid. LABS  hb 9.8    ======================================= 3/27/24  57 yo female with severe Crohn's and short gut returns for 2 month f/u.  Saw Dr. Taylor on  and he took 13 vials of blood. Told he needed zinc. He rx Lomotil 6 po qid with Immodium 4 tid. That is not controlling outputs better. He got her a book on Short Gut.  No ER visits in the last 2 montsh.. Wt is stable at 131.5. TPN is down to 10 hours qod. Eating normally. Emppties pouch 8-10 times a day. Daily abd pain which is intermittent. Arthritis is worse. Allergic to sulfa but tolerating sulfasalazine and getting 1 bid from dr. Mcdaniel.  Never had total body arthritis. Every body part aches. Thumbs and hands are a mess.  She is allergic to tape. May she take a week off TPN TPN labs from 3/21 look very good with alb 3.6.    ============================= 24  57 yo female with severe CD and short gut now on TPN got 3 weeks after being off it for 2 months, has appt with  Guzman Taylor , and now here for interval f/u.  Things overall going well. Empties pouch 8-10 per day. TPN is 24 hours a day, may get decreased to 18 per day. Has intermittent abdominal pain, daily, and takes pain medicine to control it. Using 800 mg motrin for joint pain and abdominal pain. she knows Motrin can flare Crohns disease.  Dicyclomine 20 mg qid. Takes lomotil in conjunction with lomotil. Wants to increase lomotil from 2 bid to 2 qid.  Labs pending.     ============================== 23  57 yo female returns for 2 month f/u. She has improved greatly on TPN. She had approximately 2-3 months of TPN and home health. On Skyrizi 360 mg sq q 8w.  Empties pouch 8-12 times a day. No ER visits for 2-3 months while receiving TPN. Consultation with Dr. Taylor at [place] is pending and currently for 2024. I was encouraged that on Skyrizi the stool biomarkers are nl.  Ab to try some meat and other foods at this point and will see how it goes.  ================================== 23  57 yo female with Crohn's disease comes in for post hosp f/u.  Last resection was 2023 and diarrhea worse since then. 6 foot resection per Juanita.????  Hosp for a week and d/c 9 days ago.  Syx on admission. Weakness and low BP. Saw Dr. Reynoso who admitted her.  Has Passport in left arm. Infusions: TPN from ??????? Getting 1000 ml fluid a day. Getting TPN at Home is 16 hours a day  Percocet Methocarbinmol Folic acid Lomotil 3 bid Immodiuym Paxil Gabapentin  [facility] pharmacy has tincture of opium. Appt with Dr. Reynoso  She likes the skyrizi.   ================================ 7/10/23  57 yo female with h/o Breast cancer and Crohn's disease which is severe.  Doing well on Skyrizi. Diarrhea is better as result of Skyrizi and surgery. Arthritis is better.  Issues: 1) Ostomy outputs are increased. taking more lomotil, increased lomotil from 2 to 3 per day and 3 immodium tid.  Consider tincture of opium.  Multiple checks for c diff ---- never been positive.  Eating better. Blood pressure is low.  Offered a diecitian - declines.  Will use tincture of opium 5 drop qid in lieu of lomotil.    ============================ 23  58 yo female with severe CD. Had pain and vomiting and was admitted to [facility] 3/3 and had ileostomy revision and resection of 6 feet of intestin on 3/6/23 under Dr. Talamantes's care. Hosp 2.5 weeks.  Feeling so much better. Less pain.  still on oxycodone 7.5 - GP gives it to her. Hope is to get off pain. No steroids. Off budesonide Off mtx. Just got 2nd injection of Skyrizi  23===========  58 yo female with severe Crohn's on Skyrizi and budesonide 3mg one per day, methotrexate .5 cc qow --due to mouth ulcers, decreased from 1 cc per week, .5 cc per week and then the currnet .5 cc qow lomotil 2 bid Dicyclomine 4 (20)bid ----- should consider 2 qid Aciphex 1 bid  ESR has stayed elevated but CRP normalized on 22  Called our office Friday afternoon and spoke with Dr. Vuong. Called at 3:30p Friday and then called on call at 5p. c/o waking up with distension and hardness and gassy She thought it could be a partial blockage and so she took colace It sounded like a running faucet but has had regular output. Dr. Talamantes wants a CT stat --- to expedite. Problems: Severe Crohn's disease h/o Breast cancer High ostomy outputs abdominal pain Anorexia Weight loss - 3 lbs Immunosuppression Abdominal distension  ============================ 10/19/22  58 yo female for 2 month f/u. TH from home  Decision made to switch to Skyrizi.  Started Skyrizi in [place] about 6 weeks ago. Next appt is  - iv Dose 3 --- Likellt start 9;/15-Dose 1 Delay in getting Entyvio change to Skyrizi. She is not sure if the Skyrizi is working.   It was 4 weeks--- not excessive between last Entyvio and first Skyrizi.  Stooling is the same as 4 weeks ago. More abd pain Has heaviness and upper abdominal pain.   ====================================================== 22  58 yo female comes in with  Guzman for 3 week follow up.  She feels her Crohn's is better. Ileostomy outputs similar. 5-7 per day. No blood. Mouth is much better. Less pain. Rectum is in place. Mucus and stool per rectum. Blood per rectum She feels closing her up is not an answer.  Problems with back --- spinal stenosis - Guzman Barajas MD [facility]. Hip and Knee hurt. Has an appointment with [facility].  Not eating much. Lost 6 more lbs. Been sick for 1.5 weeks - does not know. Headache and neck ache, could not lift head. Has been exhausted and dizzy. Can't walk in a straight line.  Has primary MD. Slade Auguste MD - [place]. Less steady when whe walks. Very weak. Difficulty getting here for the appointment.  Not eating because not hung  Meds: Entyvio 300 mg iv q 4w Gabapentin 300 mg po tid started 3 weeks ago Lomotil Paxil Dicyclomine Folic acid Ibuprofen Trazodone  For upcoming back fusion with Dr Jenn Adkinsgic to tape, levoquin, sulfa   ===================== 22  58 yo female with severe CD on Entyvio 300 mg iv q 4 w and mtx 12.5 mg sq q week Budesonide 3 mg a day and folic acid.  Main complaints are: Mouth ulcers Abdominal pain Diarrhea- empties bad 7 times Bad day more than 10/ No blood in stool. Nausea - continuous - awakens with nausea. Eats and nausea. PPI- Aciphex 20 mg bid. Trazodone. No other meds  Next infusion next week.   ======================= 22  57 yo female comes in for urgent f/u re; need for Promethazine 6.25 mg/5ml solution as this works better for her when she has acute nausea and nausea unrelieved by ondansetron. I feel this is an appropriate need and should be approved by insurance even though it is non-formulary.  MRE is pending and plan per radiology is to use barium as volumen as she vomits with it.  She experiences nausea every day due to bowel dysfunction from active Crohns disease and other factors.  Meds: Entyvio 300 mg iv q 4 w Methotrexate 1 cc per week. Wants to reduce to .5 cc per week and that is fine. Folic acid 1 mg a da\7 Budesonide 3 mg now, reduced from 9 m,g =============================== 22  57 yo female returns for 5 week f/u. Stoma is fully healed. No issues with the appliances. Emptying bag 5-6 times a day and coping.  She has been having abdominal pain for the past few weeks. It felt like someone was "trying to pull the uterus out" and she calls it scar tissue. There was nothing on US, to cause the pain. MRE from 21 showed ??problable low grade partial SBO but this may be dysfunction.  She came to see me for the iron deficiency anemia. She has noticed some bleeding. She still has urgency to move her bowels. Yesterday was scary becasue she saw blood in the rectal fluid. Passes rectal fluid 8-10 times a day. At night, it can be horrendous. Bottom of diaper is full.  On Entyvio 300 mg iv q 4 Methotrexate 1 cc q week Budesonide -- 3m 3 per day. -Was feeling bad 6 months ago or so and Bud was started. May be worth trying to taper   ============================= 22  57 yo female with CD and ileostomy comes in for 3 month f/u visit.  Finally healing from stoma revision 10/15/21.  Stoma never stuck out enough and kept  Dr. Talamantes did a stoma revision. Mild "complication" as skin around the stoma, stoma had a "moat" around it.  Empties bag 5-6 times. Interval check. Eating ok. Mouth is healing from the leucovorin to lessen the effect of mtx on mouth.  Getting guidance from Myerson.  ===================================== 10/6/21  57 yo female with Crohn's and avascular necrosis and saw Dr. Myerson 2 days ago and he did blood work has some plans - f/u in 3 weeks.  Sees Dr. Gardiner tomorrow to address major ostomy issues.  Considering ostomy revision. Retracted stoma.  Saw her mother and needed more supplies due to leaking.  Entocort now for 10 weeks, 6 mg a day, empties ostomy 6-9 times.  Has leaked but not in recent weeks.  Never on 9 mg and capri 6 mg for 10 weeks.  Try alternating 6 mg and 3 mg on alternate days.    ========================  21  57 yo female with CD now for 1 month f/u after telehealth and doing better 5 weeks into course of Entocort tx.  Does havd avascular necrosis of hips and understands there are risks of similar complications with Entocort therapy.  Was on fosamax. I just called Dr. Myerson to arrange an appoinment.  Ostomy - empties it 6 times so far today. Occasional abdominal pain.  On Entyvio every 4 weeks Entocort 9 mg a day x 5 weeks.  Asked to have CD4/CD8 checked, by sister's immunologist.   ===================== 8/3/21  57 yo female for  f/u due to beeing weak and in bed. In ER last week and they sent her home.  Meds: Entyvio 300 mg q 4 weeks Entocort x 1 week Off prednisone - had been on prednisone- she does not think it is what was helping her. On 1 cc mtx per week.  Devin Auguste is primary MD Saw him a month ago.  Main syx now.5 days ago was throwing up and was so distended. Could not poke her stomach. went to the ER Wednesday. N/V. Aciphex 1 twice daily. Last looked Never had an ulcer. Ileostomy in place.  Fever on Friday, up to 101. Eating now. Hungry yesterday  Not vomiting.  May neede EGD.     ====================== 21  56 yo female comes in for f/u.  She is "off" per Dr. Jaquez. She did an MRI. Dr. Jaquez reportedly thinks it is her meds. Has not seen a neurologist in years. Recent  labs were nl mostly with Hb 10.1, nl K-4.0 and Ca. Nl ESR and CRP, but had just finished medrol dose eliza.  Wants a Mg level. Labs to be sent to [facility]. Nl quantiferon.  Currently , getting Entyvio every 4 weeks.  Getting entyvio every 4 weeks. Taking 2 weeks off mtx due to stomatitis. If resuming, would reduce from 1 cc to .5 cc..  She is not sure how much she needs mtx and how much it has helped in the past. Mtx has very likely caused the stomatis. Or the pred medrol could have helped the mouth and belly. She has avn all over.  Myerson will see her and order bone scan  Addendum ------21 Has thicker stools Has to change her bag because of high outputs She says her stoma is an outie  She is on pred 20 mg and should taper off it by going to 10 mg to 5 mg for a week and then d/c Risks of prednisone discussed.  Had dropped mtx from 25 to 12.5 and will go back up to 25 mg a week. Consider [facility] referral if not improvimg at that point. ============ 21  56 yo female with CD and ostomy for 5 month f/u.  Abd is good. Ostomy is functioning. Sleep thru much of night.  Appetite and intake is better. Oral CD problematic. Has been in touch with Dr. Pepe ---- swish and spit.  Max-- 150. Current weight is 121.  Not taking supplements -- Boost and Ensure. 2-3 cans The y don't agree with her.. R Lactose free ice  She wants low dose prednisone. Lets try 2.5-5 mg a day.  ================== 20  56 yo female for 3 week TH f/u. Has upper abdominal pain that may be due to gallstones. Had referred her to Dr. Moreland but she did not call. She heard he was retiring and not. Has abdominal pain when she. Left upper quadrant or "upper rib cage" mostly left. He knows Dr. Talamantes. - Frustrated---- blisters on lip. Torn open. Has not tried steroid rinse or paste in past. Magic mouth wash helps. Does not reduce the blisters. Dr. Best- non steroid options for mouth   ======================== 10/23/20  10/23 56 yo female for 3 week TH f/u. Has oral Crohn's and I-C and stoma issues. Entyvio may not cover the EIM. Might try oral steroid lozenge --- pred paste and lozenges.  Entyvio 300 q 4 weeks Mtx 1 cc q week Pred 5 mg may stop. Bentyl -  Abd pain is ok No idea what triggered her admission Food induced -  Consider medrol dose eliza in the future  Extensive lab review CBC --- Hct 31.6/Hb 10.9 plt 120 CRP 22 ESR 56 Iron 33% Ferrintin good ================================================== 10/2/20  56 yo female with Crohn's disease recently hosp 3 days at  for abd pain, N/V and got better with 3 dasy 3 days of iv steroids.  Surgery considered.  Was on 40 mg pred - intolerant anxiety  Now dropped to 30 mg a day. Now is 50-60% better than admission.  Surgery debated gallstones. They leaned toward blockage.  Has been on Stelara. Entyvio works better than Stelara. Mtx makes her tired She would like to up the dose of lomotil.  20  55 yo female with Crohnn's disease having continued active syx. Getting Entyvio every 4 weeks. Methotrexate 12.5 mg per week Dicyclomine tablets 2 po qid  == She has taken Xifaxan and it tears her up. Then stop it. Does not like flagyl and does not help.  Pepto 1-2 doses a day, does help the diarreha. She uses Immodium every day.  Stooling ----- empties bag 6-10 times a day. Abdominal pain - when she eats and intermittent. Energy - gone.  Labs- Anemia - Hb lower CRP is higher ---34 (was 16).   ========================== 20  Crohn's disease.   55 yo female with Crohn's disease comes in for f/u.  Dr. Jaquez is considering a GYN etiology.  Needs a pouch exam.  Getting Entyvio every 4 weeks.  Stelara did not work as well as Entyvio.  Normally when she gets an infusion, she gets a bounce and has not.  Prometheus Vedo level 3/25, just before 4 week dose was great_ __ _32.5 and no antibodies.  Feb 10 labs looked good CRP 29.9 and cbc looked fine. ESR elevation.  2 liters of miralax_ __ _ Try 6-8am _ __ _- and then an exam at 2-3 pm She asked about a pilonidal cyst and I directed to Dr. Talamantes  Xifaxan  Try pepto bismol 1-2 prn on days.    ===== 20  55 yo female comes in at my request for 3 week f/u of Crohn's due to new 4 x eleation of CRP at 29.9.  19 CRP was similarly elevated 4x at 2.1/.5  Leonid is checking CT abd-pelvis due to pelvic pain which is not characteristic of her Crohn's.  She is wondering about GYN. Or spastic bladder.  Dr. Talamantes thought stable but wants to scope to be sure.  Empties bag 10x a day.That is more than 3-6 months ago and has leakage of the ostomy bag. Maybe more volume. Very watery.  Review of labs from 2/10 show cRP 29.i9 but others are stable. CBC looks good with trace elevation of wbc and borderline anemia. Getting iv iron from Arianna Jaquez. Vit D is normal at 39. Albumin is normal at 4.5.  MRI 10/1/19 showed gallstones , present in past, and she has mild pain that is intermittent Liver tests are nl. Can assess with HIDA, and/or with surgical consult, e.g ???relative need for choly, high vs. low.    ================ 20  55 yo female with severe Crohn's disease and h/o breast cancer _ _ Arianna Jaquez MD on Letrasol. 10th year.  Abdominal pain is confusing. Has gotten pain in the ovary and in the uterus. GYN-Ann Laird MD and did US was fine. Sent to PT for pelvic treatment.  At her comment, I reviewed [facility] imaging 16 nl CTE 5/3/17 nl [facility] US Oct 2 2019 "Cholelithiasis" o/w MRE was nl - no inflammation. BUT THE MRI DID SHOW "PERSISTENT TETHERING OF UTERUS TO RECTAL STUMP"  On Entyvio 300 mg q 4 weeks. Next dose in 2 weeks On mtx _ _.5 cc. Monthly b12 shots  Eating ok, some weight loss. Weight stable x 6 months and down 30 lb since _ __ _- she is comfortable with current weight.  ============= 19  55 yo female with severe Crohn's and h/o breast cancer comes in for 3 month f/u.  Doing poorly.  Daily abd pain which is intermittent.  When she eats she gets pain and nausea.  Taking zofran and phenergan.  Sleep pattern is awry.  Empties bag 5 times a day and going out of rectum.  Has a loop ileostomy that may be problematic.  Allergic to tape.  Currently on Entyvio 300 mg q 8 weeks.  Stelara did not work as well.  Discussed the entyvio at shorter cycles of q 4 weeks to see if that will control syx better.  Outputs are higher and she may be volume depleted and maybe more entyvio will help.  last entyvio was last week and so a level won't be useful today. Will just proceed with dose increase.    ============== 18 53 yo female with Crohn's disease and breast cancer and diverting ileostomy from Dr. Talamantes.  Skin issue - addressed by Dr. Best.  See Brooke Talamantes MD and enterostomal therapist.  AGWS.  No fcs.     Review of Systems ConstitutionalDenies : body aches, fever, weight gain, weight loss CardiovascularDenies : chest pain, heart racing/skipping, high blood pressure RespiratoryDenies : chronic cough, shortness of breath, wheezing or asthma symptoms GastrointestinalDenies : Abdominal Pain/discomfort, Anal/Rectal Pain or Itching, Anal Spasm, Black Stool, Bloating/belching/gaseouness, Change of Bowel Habit, Constipation, Diarrhea/Loose Stool, Difficulty in Swallowing, Heartburn/esophageal reflux, Hemorrhoids, Indigestion, Mucus in Stool, Nausea/vomiting, Rectal Bleeding, Unintentional Weight Loss   Vitals Date Time BP Position Site L\R Cuff Size HR RR TEMP (F) WT HT BMI kg/m2 BSA m2 O2 Sat HC  2020 05:15 PM 133lbs 0oz 5' 8.5" 19.93 1.71       Assessment Crohn's Disease 555.2  Diarrhea 787.91/R19.7  Cholelithiasis 574.20/K80.20  Immunosuppression due to drug therapy V58.69/Z79.899   Problems Reconciled Plan OrdersPatient not identified as an unhealthy alcohol user when screene () - - 2020 Influenza immunization administered or previously received () - - 2020 BMI screening above normal () - - 2020 Current tobacco non-user (CAD, cap, COPD, PV) (dm) (IBD) (1036F, ) - - 2020 Colorectal cancer screening results documented and reviewed (PV) (3017F) - - 2020 Documentation of current medications. () - - 2020 EGD w/Biopsy if indicated (73701) - - 2020 Pouchoscopy (71392) - - 2020 CMP (comprehensive metabolic panel) (29619) - - 2020 Sed rate (47482) - - 2020 C-reactive protein (79073) - - 2020 Ferritin assay (44604) - - 2020 Iron + iron binding capacity panel ser/plas (15284, 33728) - - 2020 Vitamin B12 and folate measurement (45485, 96299) - - 2020 CBC with diff (40476) - - 2020 25-OH-Vitamin D (41361) - - 2020 GPP 22 (FilmArray) - Gastrointestinal Pathogen Panel - QDx (70464) - - 2020 MedicationsMedications have been Reconciled Transition of Care or Provider Policy InstructionsPatient seen today via telehealth by agreement and consent of patient in light of current COVID-19 pandemic. I used video conferencing during the visit. The patient encounter is appropriate and reasonable under the circumstances given the patient's particular presentation at this time. The patient has been advised of the followin) the potential risks and limitations of this mode of treatment (including but not limited to the absence of in-person examination); 2) the right to refuse telehealth services at any point without affecting the right to future care; 3) the right to receive in-person services, included immediately after this consultation if an urgent need arises; 4) information, including identifiable images or information from this telehealth consult, will only be shared in accordance with HIPPA regulations. Any and all of the patient's and/or patient's family member's questions on this issue have been answered. The patient has verbally consented to be treated via telehealth services. The patient has also been advised to contact this office for worsening conditions or problems, and seek emergency medical treatment and/or call 911 if the patient deems either necessary. (For commercial payers, telehealth video only) More than half of the face-to-face time used for counseling and coordination of care. 40 min appointment (06524 EP) I have documented a list of current medications and reviewed it with the patient. I encouraged the patient to diet and exercise. DispositionReturn To Clinic in 2 Months CorrespondenceCC this document (Slade Auguste MD, Brooke Talamantes MD, Arianna Jaquez MD) - 2020  42653 exam med 32 min time  needs refill or lomotil Can't eRx lomotil.  Aciphex refill.  Needs EGD and pouchoscopy        Electronically Signed by: Sánchez Perez MD -A  cc: note to Dr. Brooke Talamantes 23

## 2024-07-26 ENCOUNTER — TELEPHONE ENCOUNTER (OUTPATIENT)
Dept: URBAN - METROPOLITAN AREA CLINIC 96 | Facility: CLINIC | Age: 59
End: 2024-07-26

## 2024-08-20 ENCOUNTER — CLAIMS CREATED FROM THE CLAIM WINDOW (OUTPATIENT)
Dept: URBAN - METROPOLITAN AREA MEDICAL CENTER 39 | Facility: MEDICAL CENTER | Age: 59
End: 2024-08-20

## 2024-08-20 PROCEDURE — 99254 IP/OBS CNSLTJ NEW/EST MOD 60: CPT | Performed by: STUDENT IN AN ORGANIZED HEALTH CARE EDUCATION/TRAINING PROGRAM

## 2024-08-26 ENCOUNTER — TELEPHONE ENCOUNTER (OUTPATIENT)
Dept: URBAN - METROPOLITAN AREA CLINIC 98 | Facility: CLINIC | Age: 59
End: 2024-08-26

## 2024-09-11 ENCOUNTER — OFFICE VISIT (OUTPATIENT)
Dept: URBAN - METROPOLITAN AREA CLINIC 98 | Facility: CLINIC | Age: 59
End: 2024-09-11
Payer: MEDICARE

## 2024-09-11 VITALS
DIASTOLIC BLOOD PRESSURE: 60 MMHG | WEIGHT: 115.8 LBS | SYSTOLIC BLOOD PRESSURE: 94 MMHG | BODY MASS INDEX: 17.15 KG/M2 | HEIGHT: 69 IN | TEMPERATURE: 97.8 F | HEART RATE: 110 BPM

## 2024-09-11 DIAGNOSIS — K50.80 CROHN'S DISEASE OF BOTH SMALL AND LARGE INTESTINE: ICD-10-CM

## 2024-09-11 DIAGNOSIS — Z93.2 ILEOSTOMY IN PLACE: ICD-10-CM

## 2024-09-11 DIAGNOSIS — R63.4 WEIGHT LOSS: ICD-10-CM

## 2024-09-11 DIAGNOSIS — E44.0 MODERATE PROTEIN-CALORIE MALNUTRITION: ICD-10-CM

## 2024-09-11 PROBLEM — 60168000: Status: ACTIVE | Noted: 2024-09-11

## 2024-09-11 PROCEDURE — 99215 OFFICE O/P EST HI 40 MIN: CPT | Performed by: INTERNAL MEDICINE

## 2024-09-11 RX ORDER — ATOGEPANT 10 MG/1
1 TABLET TABLET ORAL ONCE A DAY
Status: ACTIVE | COMMUNITY

## 2024-09-11 RX ORDER — DICYCLOMINE HYDROCHLORIDE 20 MG/1
TAKE 2 TABLETS TABLET ORAL
Qty: 240 TABLETS | Refills: 5 | Status: ACTIVE | COMMUNITY
Start: 2024-07-24 | End: 2025-01-19

## 2024-09-11 RX ORDER — IBUPROFEN 100 MG/1
TAKE 2 TABLETS (200 MG) BY ORAL ROUTE EVERY 6 HOURS AS NEEDED WITH FOOD TABLET, COATED ORAL
Qty: 0 | Refills: 0 | Status: ACTIVE | COMMUNITY
Start: 1900-01-01

## 2024-09-11 RX ORDER — RISANKIZUMAB-RZAA 360 MG/2.4
360 MG WEARABLE INJECTOR SUBCUTANEOUS
Qty: 1 KIT | Refills: 8 | Status: ACTIVE | COMMUNITY
Start: 2022-10-19 | End: 2025-04-04

## 2024-09-11 RX ORDER — LETROZOLE TABLETS 2.5 MG/1
TAKE 1 TABLET (2.5 MG) BY ORAL ROUTE ONCE DAILY TABLET, FILM COATED ORAL 1
Qty: 0 | Refills: 0 | Status: ACTIVE | COMMUNITY
Start: 1900-01-01

## 2024-09-11 RX ORDER — DICYCLOMINE HYDROCHLORIDE 20 MG/1
TAKE TWO TABLETS BY MOUTH FOUR TIMES A DAY FOR 30 DAYS TABLET ORAL
Qty: 240 TABLET | Refills: 11 | Status: ACTIVE | COMMUNITY

## 2024-09-11 RX ORDER — LEUCOVORIN CALCIUM 10 MG/1
1 TABLET TABLET ORAL ONCE A DAY
Status: ACTIVE | COMMUNITY

## 2024-09-11 RX ORDER — PROMETHAZINE HYDROCHLORIDE 6.25 MG/5ML
TAKE 5 ML BY MOUTH AT BEDTIME SOLUTION ORAL
Qty: 150 ML | Refills: 11 | Status: ACTIVE | COMMUNITY

## 2024-09-11 RX ORDER — ONDANSETRON HYDROCHLORIDE 8 MG/1
1 TABLET AS NEEDED TABLET, FILM COATED ORAL TWICE DAILY
Qty: 60 TABLETS | Refills: 5 | Status: ACTIVE | COMMUNITY
Start: 2024-01-22

## 2024-09-11 RX ORDER — PROMETHAZINE HYDROCHLORIDE 25 MG/1
TAKE 1 TABLETS TABLET ORAL BID
Qty: 60 TABLETS | Refills: 5 | Status: ACTIVE | COMMUNITY
Start: 2021-02-12

## 2024-09-11 RX ORDER — VITAMIN A 2400 MCG
1 TABLET CAPSULE ORAL ONCE A DAY
Qty: 90 TABLET | Refills: 1 | Status: ACTIVE | COMMUNITY
Start: 2022-01-12

## 2024-09-11 RX ORDER — SULFASALAZINE 500 MG/1
2 TABLETS TABLET ORAL TWICE DAILY
Status: ACTIVE | COMMUNITY

## 2024-09-11 RX ORDER — ALENDRONATE SODIUM 70 MG
TABLET ORAL
Qty: 0 | Refills: 0 | Status: ACTIVE | COMMUNITY
Start: 1900-01-01

## 2024-09-11 RX ORDER — DIPHENOXYLATE HYDROCHLORIDE AND ATROPINE SULFATE 2.5; .025 MG/1; MG/1
1 TABLET AS NEEDED TABLET ORAL
Status: ACTIVE | COMMUNITY
Start: 2024-05-31

## 2024-09-11 RX ORDER — OXYCODONE HYDROCHLORIDE AND ACETAMINOPHEN 7.5; 325 MG/1; MG/1
1 TABLET AS NEEDED TABLET ORAL
Refills: 0 | Status: ACTIVE | COMMUNITY

## 2024-09-11 RX ORDER — FOLIC ACID 1 MG/1
1 TABLET TABLET ORAL ONCE A DAY
Status: ACTIVE | COMMUNITY

## 2024-09-11 RX ORDER — ONDANSETRON 8 MG/1
1 TABLET ON THE TONGUE AND ALLOW TO DISSOLVE AS NEEDED TABLET, ORALLY DISINTEGRATING ORAL ONCE A DAY
Qty: 30 TABLETS | Refills: 11 | Status: ACTIVE | COMMUNITY
Start: 2024-01-24

## 2024-09-11 RX ORDER — LOPERAMIDE HCL 2 MG
1 CAPSULE AS NEEDED CAPSULE ORAL
Status: ACTIVE | COMMUNITY

## 2024-09-11 RX ORDER — IBUPROFEN 200 MG/1
TAKE 1 CAPSULE (200 MG) BY ORAL ROUTE EVERY 6 HOURS AS NEEDED CAPSULE, LIQUID FILLED ORAL
Qty: 0 | Refills: 0 | Status: ACTIVE | COMMUNITY
Start: 1900-01-01

## 2024-09-11 RX ORDER — RABEPRAZOLE SODIUM 20 MG/1
TAKE 1 TABLET BY MOUTH TWICE DAILY TABLET, DELAYED RELEASE ORAL
Qty: 180 | Refills: 3 | Status: ACTIVE | COMMUNITY

## 2024-09-11 RX ORDER — METHOCARBAMOL 500 MG/1
1.5 TABLETS TABLET ORAL
Status: ACTIVE | COMMUNITY

## 2024-09-11 RX ORDER — PREDNISONE 10 MG/1
TAKE 40 MG DAILY TABLET ORAL ONCE A DAY
Status: ACTIVE | COMMUNITY

## 2024-09-11 NOTE — HPI-TODAY'S VISIT:
58 yo female comes in for 6 week f/u of severe Crohn's in context of ileostomy, short gut, and Skyrizi.  Doing better due to loads of antibiotics due to sepsis and loss of kidney infection.  Hosp 2 weeks.  Eating but appetitie is low. Force feeding. Drinking orgain. Does not like it. Last had TPN just befor she was hosp in early July and she hopes to resume TPN next week. Food is not enough. Can take more Orgain until she gets to next step. No appetie.  Has to go to clinic for labs. Needs to go to [facility].  Advised that she ask Dr. Taylor if she is a candidate for Gattex and will he treat her.     ======================= 23  58 yo female with severe Crohn's disease and likely shotr gut  is too sick for today's scheduled ov and is seen by telehealth.  Originally, she thought her low back needed surgery and there was possibly an abscess. [facility] admission 7/10- --admitted due to 1 week of back pain and found an infection due to her port which was removed. Getting high dose antibiotics-- Dr. Wilcox - ID. Many cx. Very little liquid from aspiration. Given iv vancomycin. Initially thought to have acute on chronic discitis/osteomyelitis involving L1 and L2. Soft tissue changes around the L5S1 vertebra. Spondylosis Gallstones  Crohn's disease is stable. Empties ostomy 8-10 times - stable. That is with Lomotil-6 4x a day. and immodium - 4 4x a day. Tried tincture of opium and it did not help. Discussed dicyclomine - to slow the gut-- she takes 3 Could increase dicyclomine to 2 qid - caution but might watch for side effects. Will check stool tests and leave the rest to others. re:TPN, she does not want it every day but qod.  ========================= 24  58 yo female returns for 3 month f/u visit.  She says she feels stable from a GI standpoint.  Juanita and I spoke 1.5 weeks ago about her nausea and I suggested stopping sulfasalazine rx by Dr. Mcdaniel and it resolved the nausea. Has joint aches of her right more than left hand.  Terrell, Hips and knees and feet.  CD meds: Skyrizia 360 mg q 8 weeks (??better than entyvio - blood work factor) dicyclomine no aza/6mp or mtx.  Stoma out puts 8-10 per day. lomotil 6 tabs qid and immodium 4 tab qid. LABS  hb 9.8    ======================================= 3/27/24  57 yo female with severe Crohn's and short gut returns for 2 month f/u.  Saw Dr. Taylor on  and he took 13 vials of blood. Told he needed zinc. He rx Lomotil 6 po qid with Immodium 4 tid. That is not controlling outputs better. He got her a book on Short Gut.  No ER visits in the last 2 montsh.. Wt is stable at 131.5. TPN is down to 10 hours qod. Eating normally. Emppties pouch 8-10 times a day. Daily abd pain which is intermittent. Arthritis is worse. Allergic to sulfa but tolerating sulfasalazine and getting 1 bid from dr. Mcdaniel.  Never had total body arthritis. Every body part aches. Thumbs and hands are a mess.  She is allergic to tape. May she take a week off TPN TPN labs from 3/21 look very good with alb 3.6.    ============================= 24  57 yo female with severe CD and short gut now on TPN got 3 weeks after being off it for 2 months, has appt with  Guzman Taylor , and now here for interval f/u.  Things overall going well. Empties pouch 8-10 per day. TPN is 24 hours a day, may get decreased to 18 per day. Has intermittent abdominal pain, daily, and takes pain medicine to control it. Using 800 mg motrin for joint pain and abdominal pain. she knows Motrin can flare Crohns disease.  Dicyclomine 20 mg qid. Takes lomotil in conjunction with lomotil. Wants to increase lomotil from 2 bid to 2 qid.  Labs pending.     ============================== 23  57 yo female returns for 2 month f/u. She has improved greatly on TPN. She had approximately 2-3 months of TPN and home health. On Skyrizi 360 mg sq q 8w.  Empties pouch 8-12 times a day. No ER visits for 2-3 months while receiving TPN. Consultation with Dr. Taylor at [facility] is pending and currently for 2024. I was encouraged that on Skyrizi the stool biomarkers are nl.  Ab to try some meat and other foods at this point and will see how it goes.  ================================== 23  57 yo female with Crohn's disease comes in for post hosp f/u.  Last resection was 2023 and diarrhea worse since then. 6 foot resection per Juanita.????  Hosp for a week and d/c 9 days ago.  Syx on admission. Weakness and low BP. Saw Dr. Reynoso who admitted her.  Has Passport in left arm. Infusions: TPN from ??????? Getting 1000 ml fluid a day. Getting TPN at Home is 16 hours a day  Percocet Methocarbinmol Folic acid Lomotil 3 bid Immodiuym Paxil Gabapentin  [facility] has tincture of opium. Appt with Dr. Reynoso  She likes the skyrizi.   ================================ 7/10/23  57 yo female with h/o Breast cancer and Crohn's disease which is severe.  Doing well on Skyrizi. Diarrhea is better as result of Skyrizi and surgery. Arthritis is better.  Issues: 1) Ostomy outputs are increased. taking more lomotil, increased lomotil from 2 to 3 per day and 3 immodium tid.  Consider tincture of opium.  Multiple checks for c diff - -  never been positive.  Eating better. Blood pressure is low.  Offered a diecitian - declines.  Will use tincture of opium 5 drop qid in lieu of lomotil.    ============================ 23  56 yo female with severe CD. Had pain and vomiting and was admitted to [facility] 3/3 and had ileostomy revision and resection of 6 feet of intestin on 3/6/23 under Dr. Talamantes's care. Hosp 2.5 weeks.  Feeling so much better. Less pain.  still on oxycodone 7.5 - GP gives it to her. Hope is to get off pain. No steroids. Off budesonide Off mtx. Just got 2nd injection of Skyrizi  23===========  56 yo female with severe Crohn's on Skyrizi and budesonide 3mg one per day, methotrexate .5 cc qow --due to mouth ulcers, decreased from 1 cc per week, .5 cc per week and then the currnet .5 cc qow lomotil 2 bid Dicyclomine 4 (20)bid - - - should consider 2 qid Aciphex 1 bid  ESR has stayed elevated but CRP normalized on 22  Called our office Friday afternoon and spoke with Dr. Vuong. Called at 3:30p Friday and then called on call at 5p. c/o waking up with distension and hardness and gassy She thought it could be a partial blockage and so she took colace It sounded like a running faucet but has had regular output. Dr. Talamantes wants a CT stat - - to expedite. Problems: Severe Crohn's disease h/o Breast cancer High ostomy outputs abdominal pain Anorexia Weight loss - 3 lbs Immunosuppression Abdominal distension  ============================ 10/19/22  56 yo female for 2 month f/u. TH from home  Decision made to switch to Skyrizi.  Started Skyrizi in [place] about 6 weeks ago. Next appt is  - iv Dose 3 - - Likellt start 9;/15-Dose 1 Delay in getting Entyvio change to Skyrmanjui. She is not sure if the Skyrizi is working.   It was 4 weeks- - not excessive between last Entyvio and first Skyrizi.  Stooling is the same as 4 weeks ago. More abd pain Has heaviness and upper abdominal pain.   ====================================================== 22  56 yo female comes in with  Guzman for 3 week follow up.  She feels her Crohn's is better. Ileostomy outputs similar. 5-7 per day. No blood. Mouth is much better. Less pain. Rectum is in place. Mucus and stool per rectum. Blood per rectum She feels closing her up is not an answer.  Problems with back - - spinal stenosis - Guzman Barajas MD [facility]. Hip and Knee hurt. Has an appointment with [facility].  Not eating much. Lost 6 more lbs. Been sick for 1.5 weeks - does not know. Headache and neck ache, could not lift head. Has been exhausted and dizzy. Can't walk in a straight line.  Has primary MD. Slade Auguste MD - Enedina Horner. Less steady when whe walks. Very weak. Difficulty getting here for the appointment.  Not eating because not hung  Meds: Entyvio 300 mg iv q 4w Gabapentin 300 mg po tid started 3 weeks ago Lomotil Paxil Dicyclomine Folic acid Ibuprofen Trazodone  For upcoming back fusion with Dr Jenn Adkinsgic to tape, levoquin, sulfa   ===================== 22  56 yo female with severe CD on Entyvio 300 mg iv q 4 w and mtx 12.5 mg sq q week Budesonide 3 mg a day and folic acid.  Main complaints are: Mouth ulcers Abdominal pain Diarrhea- empties bad 7 times Bad day more than 10/ No blood in stool. Nausea - continuous - awakens with nausea. Eats and nausea. PPI- Aciphex 20 mg bid. Trazodone. No other meds  Next infusion next week.   ======================= 22  55 yo female comes in for urgent f/u re; need for Promethazine 6.25 mg/5ml solution as this works better for her when she has acute nausea and nausea unrelieved by ondansetron. I feel this is an appropriate need and should be approved by insurance even though it is non-formulary.  MRE is pending and plan per radiology is to use barium as volumen as she vomits with it.  She experiences nausea every day due to bowel dysfunction from active Crohns disease and other factors.  Meds: Entyvio 300 mg iv q 4 w Methotrexate 1 cc per week. Wants to reduce to .5 cc per week and that is fine. Folic acid 1 mg a da\7 Budesonide 3 mg now, reduced from 9 m,g =============================== 22  55 yo female returns for 5 week f/u. Stoma is fully healed. No issues with the appliances. Emptying bag 5-6 times a day and coping.  She has been having abdominal pain for the past few weeks. It felt like someone was "trying to pull the uterus out" and she calls it scar tissue. There was nothing on US, to cause the pain. MRE from 21 showed ??problable low grade partial SBO but this may be dysfunction.  She came to see me for the iron deficiency anemia. She has noticed some bleeding. She still has urgency to move her bowels. Yesterday was scary becasue she saw blood in the rectal fluid. Passes rectal fluid 8-10 times a day. At night, it can be horrendous. Bottom of diaper is full.  On Entyvio 300 mg iv q 4 Methotrexate 1 cc q week Budesonide -- 3m 3 per day. -Was feeling bad 6 months ago or so and Bud was started. May be worth trying to taper   ============================= 22  55 yo female with CD and ileostomy comes in for 3 month f/u visit.  Finally healing from stoma revision 10/15/21.  Stoma never stuck out enough and kept  Dr. Talamantes did a stoma revision. Mild "complication" as skin around the stoma, stoma had a "moat" around it.  Empties bag 5-6 times. Interval check. Eating ok. Mouth is healing from the leucovorin to lessen the effect of mtx on mouth.  Getting guidance from Myerson.  ===================================== 10/6/21  55 yo female with Crohn's and avascular necrosis and saw Dr. Myerson 2 days ago and he did blood work has some plans - f/u in 3 weeks.  Sees Dr. Gardiner tomorrow to address major ostomy issues.  Considering ostomy revision. Retracted stoma.  Saw her mother and needed more supplies due to leaking.  Entocort now for 10 weeks, 6 mg a day, empties ostomy 6-9 times.  Has leaked but not in recent weeks.  Never on 9 mg and capri 6 mg for 10 weeks.  Try alternating 6 mg and 3 mg on alternate days.    ========================  21  55 yo female with CD now for 1 month f/u after telehealth and doing better 5 weeks into course of Entocort tx.  Does havd avascular necrosis of hips and understands there are risks of similar complications with Entocort therapy.  Was on fosamax. I just called Dr. Myerson to arrange an appoinment.  Ostomy - empties it 6 times so far today. Occasional abdominal pain.  On Entyvio every 4 weeks Entocort 9 mg a day x 5 weeks.  Asked to have CD4/CD8 checked, by sister's immunologist.   ===================== 8/3/21  55 yo female for ' f/u due to beeing weak and in bed. In ER last week and they sent her home.  Meds: Entyvio 300 mg q 4 weeks Entocort x 1 week Off prednisone - had been on prednisone- she does not think it is what was helping her. On 1 cc mtx per week.  Devin Auguste is primary MD Saw him a month ago.  Main syx now.5 days ago was throwing up and was so distended. Could not poke her stomach. went to the ER Wednesday. N/V. Aciphex 1 twice daily. Last looked Never had an ulcer. Ileostomy in place.  Fever on Friday, up to 101. Eating now. Hungry yesterday  Not vomiting.  May neede EGD.     ====================== 21  56 yo female comes in for f/u.  She is "off" per Dr. Jqauez. She did an MRI. Dr. Jaquez reportedly thinks it is her meds. Has not seen a neurologist in years. Recent  labs were nl mostly with Hb 10.1, nl K-4.0 and Ca. Nl ESR and CRP, but had just finished medrol dose eliza.  Wants a Mg level. Labs to be sent to [facility]. Nl quantiferon.  Currently , getting Entyvio every 4 weeks.  Getting entyvio every 4 weeks. Taking 2 weeks off mtx due to stomatitis. If resuming, would reduce from 1 cc to .5 cc..  She is not sure how much she needs mtx and how much it has helped in the past. Mtx has very likely caused the stomatis. Or the pred medrol could have helped the mouth and belly. She has avn all over.  Myerson will see her and order bone scan  Addendum - - --21 Has thicker stools Has to change her bag because of high outputs She says her stoma is an outie  She is on pred 20 mg and should taper off it by going to 10 mg to 5 mg for a week and then d/c Risks of prednisone discussed.  Had dropped mtx from 25 to 12.5 and will go back up to 25 mg a week. Consider [place] referral if not improvimg at that point. ============ 21  56 yo female with CD and ostomy for 5 month f/u.  Abd is good. Ostomy is functioning. Sleep thru much of night.  Appetite and intake is better. Oral CD problematic. Has been in touch with Dr. Pepe - -  swish and spit.  Max-- 150. Current weight is 121.  Not taking supplements -- Boost and Ensure. 2-3 cans The y don't agree with her.. R Lactose free ice  She wants low dose prednisone. Lets try 2.5-5 mg a day.  ================== 20  56 yo female for 3 week TH f/u. Has upper abdominal pain that may be due to gallstones. Had referred her to Dr. Moreland but she did not call. She heard he was retiring and not. Has abdominal pain when she. Left upper quadrant or "upper rib cage" mostly left. He knows Dr. Talamantes. - Frustrated- -  blisters on lip. Torn open. Has not tried steroid rinse or paste in past. Magic mouth wash helps. Does not reduce the blisters. Dr. Best- non steroid options for mouth   ======================== 10/23/20  10/23 56 yo female for 3 week  f/u. Has oral Crohn's and I-C and stoma issues. Entyvio may not cover the EIM. Might try oral steroid lozenge - - pred paste and lozenges.  Entyvio 300 q 4 weeks Mtx 1 cc q week Pred 5 mg may stop. Bentyl -  Abd pain is ok No idea what triggered her admission Food induced -  Consider medrol dose eliza in the future  Extensive lab review CBC - - Hct 31.6/Hb 10.9 plt 120 CRP 22 ESR 56 Iron 33% Ferrintin good ================================================== 10/2/20  56 yo female with Crohn's disease recently hosp 3 days at  for abd pain, N/V and got better with 3 dasy 3 days of iv steroids.  Surgery considered.  Was on 40 mg pred - intolerant anxiety  Now dropped to 30 mg a day. Now is 50-60% better than admission.  Surgery debated gallstones. They leaned toward blockage.  Has been on Stelara. Entyvio works better than Stelara. Mtx makes her tired She would like to up the dose of lomotil.  20  55 yo female with Crohnn's disease having continued active syx. Getting Entyvio every 4 weeks. Methotrexate 12.5 mg per week Dicyclomine tablets 2 po qid  == She has taken Xifaxan and it tears her up. Then stop it. Does not like flagyl and does not help.  Pepto 1-2 doses a day, does help the diarreha. She uses Immodium every day.  Stooling - - - empties bag 6-10 times a day. Abdominal pain - when she eats and intermittent. Energy - gone.  Labs- Anemia - Hb lower CRP is higher - -34 (was 16).   ========================== 20  Crohn's disease.   55 yo female with Crohn's disease comes in for f/u.  Dr. Jaquez is considering a GYN etiology.  Needs a pouch exam.  Getting Entyvio every 4 weeks.  Stelara did not work as well as Entyvio.  Normally when she gets an infusion, she gets a bounce and has not.  Prometheus Vedo level 3/25, just before 4 week dose was great_ __ _32.5 and no antibodies.  Feb 10 labs looked good CRP 29.9 and cbc looked fine. ESR elevation.  2 liters of miralax_ __ _ Try 6-8am _ __ _- and then an exam at 2-3 pm She asked about a pilonidal cyst and I directed to Dr. Talamantes  Xifaxan  Try pepto bismol 1-2 prn on days.    ===== 20  55 yo female comes in at my request for 3 week f/u of Crohn's due to new 4 x eleation of CRP at 29.9.  19 CRP was similarly elevated 4x at 2.1/.5  Leonid is checking CT abd-pelvis due to pelvic pain which is not characteristic of her Crohn's.  She is wondering about GYN. Or spastic bladder.  Dr. Talamantes thought stable but wants to scope to be sure.  Empties bag 10x a day.That is more than 3-6 months ago and has leakage of the ostomy bag. Maybe more volume. Very watery.  Review of labs from 2/10 show cRP 29.i9 but others are stable. CBC looks good with trace elevation of wbc and borderline anemia. Getting iv iron from Arianna Jaquez. Vit D is normal at 39. Albumin is normal at 4.5.  MRI 10/1/19 showed gallstones , present in past, and she has mild pain that is intermittent Liver tests are nl. Can assess with HIDA, and/or with surgical consult, e.g ???relative need for choly, high vs. low.    ================ 20  55 yo female with severe Crohn's disease and h/o breast cancer _ _ Arianna Jaquez MD on Letrasol. 10th year.  Abdominal pain is confusing. Has gotten pain in the ovary and in the uterus. GYN-Ann Laird MD and did US was fine. Sent to PT for pelvic treatment.  At her comment, I reviewed [facility] imaging 16 nl CTE 5/3/17 nl GB US Oct 2 2019 "Cholelithiasis" o/w MRE was nl - no inflammation. BUT THE MRI DID SHOW "PERSISTENT TETHERING OF UTERUS TO RECTAL STUMP"  On Entyvio 300 mg q 4 weeks. Next dose in 2 weeks On mtx _ _.5 cc. Monthly b12 shots  Eating ok, some weight loss. Weight stable x 6 months and down 30 lb since _ __ _- she is comfortable with current weight.  ============= 19  55 yo female with severe Crohn's and h/o breast cancer comes in for 3 month f/u.  Doing poorly.  Daily abd pain which is intermittent.  When she eats she gets pain and nausea.  Taking zofran and phenergan.  Sleep pattern is awry.  Empties bag 5 times a day and going out of rectum.  Has a loop ileostomy that may be problematic.  Allergic to tape.  Currently on Entyvio 300 mg q 8 weeks.  Stelara did not work as well.  Discussed the entyvio at shorter cycles of q 4 weeks to see if that will control syx better.  Outputs are higher and she may be volume depleted and maybe more entyvio will help.  last entyvio was last week and so a level won't be useful today. Will just proceed with dose increase.    ============== 18 53 yo female with Crohn's disease and breast cancer and diverting ileostomy from Dr. Talamantes.  Skin issue - addressed by Dr. Best.  See Brooke Talamantes MD and enterostomal therapist.  AGWS.  No fcs.     Review of Systems ConstitutionalDenies : body aches, fever, weight gain, weight loss CardiovascularDenies : chest pain, heart racing/skipping, high blood pressure RespiratoryDenies : chronic cough, shortness of breath, wheezing or asthma symptoms GastrointestinalDenies : Abdominal Pain/discomfort, Anal/Rectal Pain or Itching, Anal Spasm, Black Stool, Bloating/belching/gaseouness, Change of Bowel Habit, Constipation, Diarrhea/Loose Stool, Difficulty in Swallowing, Heartburn/esophageal reflux, Hemorrhoids, Indigestion, Mucus in Stool, Nausea/vomiting, Rectal Bleeding, Unintentional Weight Loss   Vitals Date Time BP Position Site L\R Cuff Size HR RR TEMP (F) WT HT BMI kg/m2 BSA m2 O2 Sat HC  2020 05:15 PM 133lbs 0oz 5' 8.5" 19.93 1.71       Assessment Crohn's Disease 555.2  Diarrhea 787.91/R19.7  Cholelithiasis 574.20/K80.20  Immunosuppression due to drug therapy V58.69/Z79.899   Problems Reconciled Plan OrdersPatient not identified as an unhealthy alcohol user when screene () - - 2020 Influenza immunization administered or previously received () - - 2020 BMI screening above normal () - - 2020 Current tobacco non-user (CAD, cap, COPD, PV) (dm) (IBD) (1036F, ) - - 2020 Colorectal cancer screening results documented and reviewed (PV) (3017F) - - 2020 Documentation of current medications. () - - 2020 EGD w/Biopsy if indicated (98619) - - 2020 Pouchoscopy (98830) - - 2020 CMP (comprehensive metabolic panel) (79857) - - 2020 Sed rate (24508) - - 2020 C-reactive protein (73136) - - 2020 Ferritin assay (96103) - - 2020 Iron + iron binding capacity panel ser/plas (96192, 19369) - - 2020 Vitamin B12 and folate measurement (64717, 05009) - - 2020 CBC with diff (99358) - - 2020 25-OH-Vitamin D (93215) - - 2020 GPP 22 (FilmArray) - Gastrointestinal Pathogen Panel - QDx (14926) - - 2020 MedicationsMedications have been Reconciled Transition of Care or Provider Policy InstructionsPatient seen today via telehealth by agreement and consent of patient in light of current COVID-19 pandemic. I used video conferencing during the visit. The patient encounter is appropriate and reasonable under the circumstances given the patient's particular presentation at this time. The patient has been advised of the followin) the potential risks and limitations of this mode of treatment (including but not limited to the absence of in-person examination); 2) the right to refuse telehealth services at any point without affecting the right to future care; 3) the right to receive in-person services, included immediately after this consultation if an urgent need arises; 4) information, including identifiable images or information from this telehealth consult, will only be shared in accordance with HIPPA regulations. Any and all of the patient's and/or patient's family member's questions on this issue have been answered. The patient has verbally consented to be treated via telehealth services. The patient has also been advised to contact this office for worsening conditions or problems, and seek emergency medical treatment and/or call 911 if the patient deems either necessary. (For commercial payers, telehealth video only) More than half of the face-to-face time used for counseling and coordination of care. 40 min appointment (70445 EP) I have documented a list of current medications and reviewed it with the patient. I encouraged the patient to diet and exercise. DispositionReturn To Clinic in 2 Months CorrespondenceCC this document (Slade Auguste MD, Brooke Talamantes MD, Arianna Jaquez MD) - 2020  64289 exam med 32 min time  needs refill or lomotil Can't eRx lomotil.  Aciphex refill.  Needs EGD and pouchoscopy        Electronically Signed by: Sánchez Perez MD -A  cc: note to Dr. Brooke Talamantes 23

## 2024-09-26 ENCOUNTER — TELEPHONE ENCOUNTER (OUTPATIENT)
Dept: URBAN - METROPOLITAN AREA CLINIC 98 | Facility: CLINIC | Age: 59
End: 2024-09-26

## 2024-09-26 ENCOUNTER — LAB OUTSIDE AN ENCOUNTER (OUTPATIENT)
Dept: URBAN - METROPOLITAN AREA CLINIC 98 | Facility: CLINIC | Age: 59
End: 2024-09-26

## 2024-09-26 PROBLEM — 235595009: Status: ACTIVE | Noted: 2024-09-26

## 2024-10-03 ENCOUNTER — TELEPHONE ENCOUNTER (OUTPATIENT)
Dept: URBAN - METROPOLITAN AREA CLINIC 98 | Facility: CLINIC | Age: 59
End: 2024-10-03

## 2024-10-03 RX ORDER — RISANKIZUMAB-RZAA 360 MG/2.4
360 MG WEARABLE INJECTOR SUBCUTANEOUS
Qty: 1 KIT | Refills: 8
Start: 2022-10-19 | End: 2026-02-19

## 2024-10-08 ENCOUNTER — TELEPHONE ENCOUNTER (OUTPATIENT)
Dept: URBAN - METROPOLITAN AREA CLINIC 98 | Facility: CLINIC | Age: 59
End: 2024-10-08

## 2024-10-08 RX ORDER — FOLIC ACID 1 MG/1
1 TABLET TABLET ORAL ONCE A DAY
Qty: 30 TABLET | Refills: 11

## 2024-10-08 RX ORDER — PROMETHAZINE HYDROCHLORIDE 6.25 MG/5ML
TAKE 5 ML BY MOUTH AT BEDTIME SOLUTION ORAL
Qty: 150 ML | Refills: 11
End: 2025-10-03

## 2024-11-05 ENCOUNTER — OFFICE VISIT (OUTPATIENT)
Dept: URBAN - METROPOLITAN AREA SURGERY CENTER 18 | Facility: SURGERY CENTER | Age: 59
End: 2024-11-05

## 2024-11-05 RX ORDER — LOPERAMIDE HCL 2 MG
1 CAPSULE AS NEEDED CAPSULE ORAL
Status: ACTIVE | COMMUNITY

## 2024-11-05 RX ORDER — SULFASALAZINE 500 MG/1
2 TABLETS TABLET ORAL TWICE DAILY
Status: ACTIVE | COMMUNITY

## 2024-11-05 RX ORDER — LETROZOLE TABLETS 2.5 MG/1
TAKE 1 TABLET (2.5 MG) BY ORAL ROUTE ONCE DAILY TABLET, FILM COATED ORAL 1
Qty: 0 | Refills: 0 | Status: ACTIVE | COMMUNITY
Start: 1900-01-01

## 2024-11-05 RX ORDER — PROMETHAZINE HYDROCHLORIDE 25 MG/1
TAKE 1 TABLETS TABLET ORAL BID
Qty: 60 TABLETS | Refills: 5 | Status: ACTIVE | COMMUNITY
Start: 2021-02-12

## 2024-11-05 RX ORDER — VITAMIN A 2400 MCG
1 TABLET CAPSULE ORAL ONCE A DAY
Qty: 90 TABLET | Refills: 1 | Status: ACTIVE | COMMUNITY
Start: 2022-01-12

## 2024-11-05 RX ORDER — PROMETHAZINE HYDROCHLORIDE 6.25 MG/5ML
TAKE 5 ML BY MOUTH AT BEDTIME SOLUTION ORAL
Qty: 150 ML | Refills: 11 | Status: ACTIVE | COMMUNITY
End: 2025-10-03

## 2024-11-05 RX ORDER — METHOCARBAMOL 500 MG/1
1.5 TABLETS TABLET ORAL
Status: ACTIVE | COMMUNITY

## 2024-11-05 RX ORDER — PREDNISONE 10 MG/1
TAKE 40 MG DAILY TABLET ORAL ONCE A DAY
Status: ACTIVE | COMMUNITY

## 2024-11-05 RX ORDER — RABEPRAZOLE SODIUM 20 MG/1
TAKE 1 TABLET BY MOUTH TWICE DAILY TABLET, DELAYED RELEASE ORAL
Qty: 180 | Refills: 3 | Status: ACTIVE | COMMUNITY

## 2024-11-05 RX ORDER — DICYCLOMINE HYDROCHLORIDE 20 MG/1
TAKE 2 TABLETS TABLET ORAL
Qty: 240 TABLETS | Refills: 5 | Status: ACTIVE | COMMUNITY
Start: 2024-07-24 | End: 2025-01-19

## 2024-11-05 RX ORDER — IBUPROFEN 200 MG/1
TAKE 1 CAPSULE (200 MG) BY ORAL ROUTE EVERY 6 HOURS AS NEEDED CAPSULE, LIQUID FILLED ORAL
Qty: 0 | Refills: 0 | Status: ACTIVE | COMMUNITY
Start: 1900-01-01

## 2024-11-05 RX ORDER — IBUPROFEN 100 MG/1
TAKE 2 TABLETS (200 MG) BY ORAL ROUTE EVERY 6 HOURS AS NEEDED WITH FOOD TABLET, COATED ORAL
Qty: 0 | Refills: 0 | Status: ACTIVE | COMMUNITY
Start: 1900-01-01

## 2024-11-05 RX ORDER — LEUCOVORIN CALCIUM 10 MG/1
1 TABLET TABLET ORAL ONCE A DAY
Status: ACTIVE | COMMUNITY

## 2024-11-05 RX ORDER — ONDANSETRON HYDROCHLORIDE 8 MG/1
1 TABLET AS NEEDED TABLET, FILM COATED ORAL TWICE DAILY
Qty: 60 TABLETS | Refills: 5 | Status: ACTIVE | COMMUNITY
Start: 2024-01-22

## 2024-11-05 RX ORDER — ALENDRONATE SODIUM 70 MG
TABLET ORAL
Qty: 0 | Refills: 0 | Status: ACTIVE | COMMUNITY
Start: 1900-01-01

## 2024-11-05 RX ORDER — FOLIC ACID 1 MG/1
1 TABLET TABLET ORAL ONCE A DAY
Qty: 30 TABLET | Refills: 11 | Status: ACTIVE | COMMUNITY

## 2024-11-05 RX ORDER — RISANKIZUMAB-RZAA 360 MG/2.4
360 MG WEARABLE INJECTOR SUBCUTANEOUS
Qty: 1 KIT | Refills: 8 | Status: ACTIVE | COMMUNITY
Start: 2022-10-19 | End: 2026-02-19

## 2024-11-05 RX ORDER — OXYCODONE HYDROCHLORIDE AND ACETAMINOPHEN 7.5; 325 MG/1; MG/1
1 TABLET AS NEEDED TABLET ORAL
Refills: 0 | Status: ACTIVE | COMMUNITY

## 2024-11-05 RX ORDER — DIPHENOXYLATE HYDROCHLORIDE AND ATROPINE SULFATE 2.5; .025 MG/1; MG/1
1 TABLET AS NEEDED TABLET ORAL
Status: ACTIVE | COMMUNITY
Start: 2024-05-31

## 2024-11-05 RX ORDER — DICYCLOMINE HYDROCHLORIDE 20 MG/1
TAKE TWO TABLETS BY MOUTH FOUR TIMES A DAY FOR 30 DAYS TABLET ORAL
Qty: 240 TABLET | Refills: 11 | Status: ACTIVE | COMMUNITY

## 2024-11-05 RX ORDER — ATOGEPANT 10 MG/1
1 TABLET TABLET ORAL ONCE A DAY
Status: ACTIVE | COMMUNITY

## 2024-11-05 RX ORDER — ONDANSETRON 8 MG/1
1 TABLET ON THE TONGUE AND ALLOW TO DISSOLVE AS NEEDED TABLET, ORALLY DISINTEGRATING ORAL ONCE A DAY
Qty: 30 TABLETS | Refills: 11 | Status: ACTIVE | COMMUNITY
Start: 2024-01-24

## 2024-11-13 ENCOUNTER — TELEPHONE ENCOUNTER (OUTPATIENT)
Dept: URBAN - METROPOLITAN AREA CLINIC 98 | Facility: CLINIC | Age: 59
End: 2024-11-13

## 2024-11-15 ENCOUNTER — TELEPHONE ENCOUNTER (OUTPATIENT)
Dept: URBAN - METROPOLITAN AREA CLINIC 98 | Facility: CLINIC | Age: 59
End: 2024-11-15

## 2024-11-19 PROBLEM — 721730009: Status: ACTIVE | Noted: 2024-11-19

## 2024-12-02 ENCOUNTER — CLAIMS CREATED FROM THE CLAIM WINDOW (OUTPATIENT)
Dept: URBAN - METROPOLITAN AREA SURGERY CENTER 18 | Facility: SURGERY CENTER | Age: 59
End: 2024-12-02
Payer: MEDICARE

## 2024-12-02 ENCOUNTER — CLAIMS CREATED FROM THE CLAIM WINDOW (OUTPATIENT)
Dept: URBAN - METROPOLITAN AREA CLINIC 4 | Facility: CLINIC | Age: 59
End: 2024-12-02
Payer: MEDICARE

## 2024-12-02 DIAGNOSIS — K29.70 GASTRITIS: ICD-10-CM

## 2024-12-02 DIAGNOSIS — K21.9 GERD: ICD-10-CM

## 2024-12-02 DIAGNOSIS — K21.9 ACID REFLUX: ICD-10-CM

## 2024-12-02 DIAGNOSIS — K21.9 GASTRO-ESOPHAGEAL REFLUX DISEASE WITHOUT ESOPHAGITIS: ICD-10-CM

## 2024-12-02 DIAGNOSIS — K29.70 GASTRITIS, UNSPECIFIED, WITHOUT BLEEDING: ICD-10-CM

## 2024-12-02 DIAGNOSIS — K50.80 CROHN'S DISEASE OF BOTH SMALL AND LARGE INTESTINE: ICD-10-CM

## 2024-12-02 DIAGNOSIS — K29.80 ACUTE DUODENITIS: ICD-10-CM

## 2024-12-02 DIAGNOSIS — K31.89 OTHER DISEASES OF STOMACH AND DUODENUM: ICD-10-CM

## 2024-12-02 DIAGNOSIS — K31.89 ERYTHEMA OF DUODENUM: ICD-10-CM

## 2024-12-02 PROCEDURE — 43239 EGD BIOPSY SINGLE/MULTIPLE: CPT | Performed by: INTERNAL MEDICINE

## 2024-12-02 PROCEDURE — 88305 TISSUE EXAM BY PATHOLOGIST: CPT | Performed by: STUDENT IN AN ORGANIZED HEALTH CARE EDUCATION/TRAINING PROGRAM

## 2024-12-02 PROCEDURE — 00731 ANES UPR GI NDSC PX NOS: CPT | Performed by: NURSE ANESTHETIST, CERTIFIED REGISTERED

## 2024-12-02 RX ORDER — FOLIC ACID 1 MG/1
1 TABLET TABLET ORAL ONCE A DAY
Qty: 30 TABLET | Refills: 11 | Status: ACTIVE | COMMUNITY

## 2024-12-02 RX ORDER — DIPHENOXYLATE HYDROCHLORIDE AND ATROPINE SULFATE 2.5; .025 MG/1; MG/1
1 TABLET AS NEEDED TABLET ORAL
Status: ACTIVE | COMMUNITY
Start: 2024-05-31

## 2024-12-02 RX ORDER — PROMETHAZINE HYDROCHLORIDE 6.25 MG/5ML
TAKE 5 ML BY MOUTH AT BEDTIME SOLUTION ORAL
Qty: 150 ML | Refills: 11 | Status: ACTIVE | COMMUNITY
End: 2025-10-03

## 2024-12-02 RX ORDER — PROMETHAZINE HYDROCHLORIDE 25 MG/1
TAKE 1 TABLETS TABLET ORAL BID
Qty: 60 TABLETS | Refills: 5 | Status: ACTIVE | COMMUNITY
Start: 2021-02-12

## 2024-12-02 RX ORDER — IBUPROFEN 100 MG/1
TAKE 2 TABLETS (200 MG) BY ORAL ROUTE EVERY 6 HOURS AS NEEDED WITH FOOD TABLET, COATED ORAL
Qty: 0 | Refills: 0 | Status: ACTIVE | COMMUNITY
Start: 1900-01-01

## 2024-12-02 RX ORDER — LEUCOVORIN CALCIUM 10 MG/1
1 TABLET TABLET ORAL ONCE A DAY
Status: ACTIVE | COMMUNITY

## 2024-12-02 RX ORDER — RISANKIZUMAB-RZAA 360 MG/2.4
360 MG WEARABLE INJECTOR SUBCUTANEOUS
Qty: 1 KIT | Refills: 8 | Status: ACTIVE | COMMUNITY
Start: 2022-10-19 | End: 2026-02-19

## 2024-12-02 RX ORDER — IBUPROFEN 200 MG/1
TAKE 1 CAPSULE (200 MG) BY ORAL ROUTE EVERY 6 HOURS AS NEEDED CAPSULE, LIQUID FILLED ORAL
Qty: 0 | Refills: 0 | Status: ACTIVE | COMMUNITY
Start: 1900-01-01

## 2024-12-02 RX ORDER — METHOCARBAMOL 500 MG/1
1.5 TABLETS TABLET ORAL
Status: ACTIVE | COMMUNITY

## 2024-12-02 RX ORDER — ONDANSETRON 8 MG/1
1 TABLET ON THE TONGUE AND ALLOW TO DISSOLVE AS NEEDED TABLET, ORALLY DISINTEGRATING ORAL ONCE A DAY
Qty: 30 TABLETS | Refills: 11 | Status: ACTIVE | COMMUNITY
Start: 2024-01-24

## 2024-12-02 RX ORDER — SULFASALAZINE 500 MG/1
2 TABLETS TABLET ORAL TWICE DAILY
Status: ACTIVE | COMMUNITY

## 2024-12-02 RX ORDER — LOPERAMIDE HCL 2 MG
1 CAPSULE AS NEEDED CAPSULE ORAL
Status: ACTIVE | COMMUNITY

## 2024-12-02 RX ORDER — PREDNISONE 10 MG/1
TAKE 40 MG DAILY TABLET ORAL ONCE A DAY
Status: ACTIVE | COMMUNITY

## 2024-12-02 RX ORDER — OXYCODONE HYDROCHLORIDE AND ACETAMINOPHEN 7.5; 325 MG/1; MG/1
1 TABLET AS NEEDED TABLET ORAL
Refills: 0 | Status: ACTIVE | COMMUNITY

## 2024-12-02 RX ORDER — DICYCLOMINE HYDROCHLORIDE 20 MG/1
TAKE TWO TABLETS BY MOUTH FOUR TIMES A DAY FOR 30 DAYS TABLET ORAL
Qty: 240 TABLET | Refills: 11 | Status: ACTIVE | COMMUNITY

## 2024-12-02 RX ORDER — LETROZOLE TABLETS 2.5 MG/1
TAKE 1 TABLET (2.5 MG) BY ORAL ROUTE ONCE DAILY TABLET, FILM COATED ORAL 1
Qty: 0 | Refills: 0 | Status: ACTIVE | COMMUNITY
Start: 1900-01-01

## 2024-12-02 RX ORDER — VITAMIN A 2400 MCG
1 TABLET CAPSULE ORAL ONCE A DAY
Qty: 90 TABLET | Refills: 1 | Status: ACTIVE | COMMUNITY
Start: 2022-01-12

## 2024-12-02 RX ORDER — DICYCLOMINE HYDROCHLORIDE 20 MG/1
TAKE 2 TABLETS TABLET ORAL
Qty: 240 TABLETS | Refills: 5 | Status: ACTIVE | COMMUNITY
Start: 2024-07-24 | End: 2025-01-19

## 2024-12-02 RX ORDER — ALENDRONATE SODIUM 70 MG
TABLET ORAL
Qty: 0 | Refills: 0 | Status: ACTIVE | COMMUNITY
Start: 1900-01-01

## 2024-12-02 RX ORDER — ATOGEPANT 10 MG/1
1 TABLET TABLET ORAL ONCE A DAY
Status: ACTIVE | COMMUNITY

## 2024-12-02 RX ORDER — ONDANSETRON HYDROCHLORIDE 8 MG/1
1 TABLET AS NEEDED TABLET, FILM COATED ORAL TWICE DAILY
Qty: 60 TABLETS | Refills: 5 | Status: ACTIVE | COMMUNITY
Start: 2024-01-22

## 2024-12-02 RX ORDER — RABEPRAZOLE SODIUM 20 MG/1
TAKE 1 TABLET BY MOUTH TWICE DAILY TABLET, DELAYED RELEASE ORAL
Qty: 180 | Refills: 3 | Status: ACTIVE | COMMUNITY

## 2024-12-31 NOTE — PHYSICAL EXAM GASTROINTESTINAL
Abdomen, soft, nontender, nondistended, normal bowel sounds, Liver and Spleen, no hepatomegaly present
Airway  Urgency: elective    Start Time: 7/26/2023 7:18 AM    General Information and Staff    Patient location during procedure: OR  Anesthesiologist: Fer Thompson MD  Resident/CRNA: Rachel Najera CRNA  Performed by: Rachel Najera CRNA  Authorized by: Fer Thompson MD      Indications and Patient Condition  Indications for airway management: anesthesia  Sedation level: general  Preoxygenated: yes  Patient position: sniffing  MILS maintained throughout  Mask difficulty assessment: 1 - vent by mask    Final Airway Details  Final airway type: endotracheal airway      Successful airway: ETT  Cuffed: yes   Successful intubation technique: video laryngoscopy  Facilitating devices/methods: intubating stylet  Endotracheal tube insertion site: oral  Blade: Genny  Blade size: #3  ETT size (mm): 7.0  Cormack-Lehane Classification: grade I - full view of glottis  Placement verified by: chest auscultation, capnometry and palpation of cuff   Measured from: lips  ETT to lips (cm): 22  Number of attempts at approach: 1  Number of other approaches attempted: 0  Atraumatic airway insertion      Additional Comments  [facility] 3 blade        
scalpel

## 2025-01-08 ENCOUNTER — TELEPHONE ENCOUNTER (OUTPATIENT)
Dept: URBAN - METROPOLITAN AREA CLINIC 98 | Facility: CLINIC | Age: 60
End: 2025-01-08

## 2025-01-08 RX ORDER — ONDANSETRON 8 MG/1
1 TABLET ON THE TONGUE AND ALLOW TO DISSOLVE AS NEEDED TABLET, ORALLY DISINTEGRATING ORAL ONCE A DAY
Qty: 30 TABLETS | Refills: 11
Start: 2024-01-24

## 2025-01-08 RX ORDER — FOLIC ACID 1 MG/1
1 TABLET TABLET ORAL ONCE A DAY
Qty: 30 TABLET | Refills: 11
End: 2026-01-03

## 2025-01-08 RX ORDER — PROMETHAZINE HYDROCHLORIDE 6.25 MG/5ML
TAKE 5 ML BY MOUTH AT BEDTIME SOLUTION ORAL
Qty: 150 ML | Refills: 11
End: 2026-01-03

## 2025-01-08 RX ORDER — DICYCLOMINE HYDROCHLORIDE 20 MG/1
TAKE TWO TABLETS BY MOUTH FOUR TIMES A DAY FOR 30 DAYS TABLET ORAL
Qty: 240 TABLET | Refills: 11

## 2025-01-15 ENCOUNTER — OFFICE VISIT (OUTPATIENT)
Dept: URBAN - METROPOLITAN AREA CLINIC 98 | Facility: CLINIC | Age: 60
End: 2025-01-15
Payer: COMMERCIAL

## 2025-01-15 VITALS
DIASTOLIC BLOOD PRESSURE: 63 MMHG | TEMPERATURE: 98.2 F | HEIGHT: 69 IN | SYSTOLIC BLOOD PRESSURE: 90 MMHG | BODY MASS INDEX: 19.34 KG/M2 | WEIGHT: 130.6 LBS | HEART RATE: 67 BPM

## 2025-01-15 DIAGNOSIS — A04.8 H. PYLORI INFECTION: ICD-10-CM

## 2025-01-15 DIAGNOSIS — K50.80 CROHN'S DISEASE OF BOTH SMALL AND LARGE INTESTINE: ICD-10-CM

## 2025-01-15 DIAGNOSIS — E44.0 MODERATE PROTEIN-CALORIE MALNUTRITION: ICD-10-CM

## 2025-01-15 DIAGNOSIS — K21.9 CHRONIC GERD: ICD-10-CM

## 2025-01-15 DIAGNOSIS — K50.90 CROHN DISEASE: ICD-10-CM

## 2025-01-15 DIAGNOSIS — M86.9 OSTEOMYELITIS, UNSPECIFIED SITE, UNSPECIFIED TYPE: ICD-10-CM

## 2025-01-15 DIAGNOSIS — Z93.2 ILEOSTOMY IN PLACE: ICD-10-CM

## 2025-01-15 DIAGNOSIS — R63.4 WEIGHT LOSS: ICD-10-CM

## 2025-01-15 DIAGNOSIS — E83.42 HYPOMAGNESEMIA: ICD-10-CM

## 2025-01-15 DIAGNOSIS — Z79.620 LONG TERM (CURRENT) USE OF IMMUNOSUPPRESSIVE BIOLOGIC: ICD-10-CM

## 2025-01-15 PROCEDURE — 99215 OFFICE O/P EST HI 40 MIN: CPT | Performed by: INTERNAL MEDICINE

## 2025-01-15 RX ORDER — OXYCODONE HYDROCHLORIDE AND ACETAMINOPHEN 7.5; 325 MG/1; MG/1
1 TABLET AS NEEDED TABLET ORAL
Refills: 0 | Status: ACTIVE | COMMUNITY

## 2025-01-15 RX ORDER — ALENDRONATE SODIUM 70 MG
TABLET ORAL
Qty: 0 | Refills: 0 | Status: ACTIVE | COMMUNITY
Start: 1900-01-01

## 2025-01-15 RX ORDER — LEUCOVORIN CALCIUM 10 MG/1
1 TABLET TABLET ORAL ONCE A DAY
Status: ACTIVE | COMMUNITY

## 2025-01-15 RX ORDER — ONDANSETRON 8 MG/1
1 TABLET ON THE TONGUE AND ALLOW TO DISSOLVE AS NEEDED TABLET, ORALLY DISINTEGRATING ORAL ONCE A DAY
Qty: 30 TABLETS | Refills: 11 | Status: ACTIVE | COMMUNITY
Start: 2024-01-24

## 2025-01-15 RX ORDER — LETROZOLE TABLETS 2.5 MG/1
TAKE 1 TABLET (2.5 MG) BY ORAL ROUTE ONCE DAILY TABLET, FILM COATED ORAL 1
Qty: 0 | Refills: 0 | Status: ACTIVE | COMMUNITY
Start: 1900-01-01

## 2025-01-15 RX ORDER — IBUPROFEN 100 MG/1
TAKE 2 TABLETS (200 MG) BY ORAL ROUTE EVERY 6 HOURS AS NEEDED WITH FOOD TABLET, COATED ORAL
Qty: 0 | Refills: 0 | Status: ACTIVE | COMMUNITY
Start: 1900-01-01

## 2025-01-15 RX ORDER — SULFASALAZINE 500 MG/1
2 TABLETS TABLET ORAL TWICE DAILY
Status: ACTIVE | COMMUNITY

## 2025-01-15 RX ORDER — IBUPROFEN 200 MG/1
TAKE 1 CAPSULE (200 MG) BY ORAL ROUTE EVERY 6 HOURS AS NEEDED CAPSULE, LIQUID FILLED ORAL
Qty: 0 | Refills: 0 | Status: ACTIVE | COMMUNITY
Start: 1900-01-01

## 2025-01-15 RX ORDER — PROMETHAZINE HYDROCHLORIDE 25 MG/1
TAKE 1 TABLETS TABLET ORAL BID
Qty: 60 TABLETS | Refills: 5 | Status: ACTIVE | COMMUNITY
Start: 2021-02-12

## 2025-01-15 RX ORDER — DICYCLOMINE HYDROCHLORIDE 20 MG/1
TAKE 2 TABLETS TABLET ORAL
Qty: 240 TABLETS | Refills: 5 | Status: ACTIVE | COMMUNITY
Start: 2024-07-24 | End: 2025-01-19

## 2025-01-15 RX ORDER — RISANKIZUMAB-RZAA 360 MG/2.4
360 MG WEARABLE INJECTOR SUBCUTANEOUS
Qty: 1 KIT | Refills: 8 | Status: ACTIVE | COMMUNITY
Start: 2022-10-19 | End: 2026-02-19

## 2025-01-15 RX ORDER — LOPERAMIDE HCL 2 MG
1 CAPSULE AS NEEDED CAPSULE ORAL
Status: ACTIVE | COMMUNITY

## 2025-01-15 RX ORDER — DIPHENOXYLATE HYDROCHLORIDE AND ATROPINE SULFATE 2.5; .025 MG/1; MG/1
1 TABLET AS NEEDED TABLET ORAL
Status: ACTIVE | COMMUNITY
Start: 2024-05-31

## 2025-01-15 RX ORDER — RABEPRAZOLE SODIUM 20 MG/1
TAKE 1 TABLET BY MOUTH TWICE DAILY TABLET, DELAYED RELEASE ORAL
Qty: 180 | Refills: 3 | Status: ACTIVE | COMMUNITY

## 2025-01-15 RX ORDER — PROMETHAZINE HYDROCHLORIDE 6.25 MG/5ML
TAKE 5 ML BY MOUTH AT BEDTIME SOLUTION ORAL
Qty: 150 ML | Refills: 11 | Status: ACTIVE | COMMUNITY
End: 2026-01-03

## 2025-01-15 RX ORDER — FOLIC ACID 1 MG/1
1 TABLET TABLET ORAL ONCE A DAY
Qty: 30 TABLET | Refills: 11 | Status: ACTIVE | COMMUNITY
End: 2026-01-03

## 2025-01-15 RX ORDER — METHOCARBAMOL 500 MG/1
1.5 TABLETS TABLET ORAL
Status: ACTIVE | COMMUNITY

## 2025-01-15 RX ORDER — ONDANSETRON HYDROCHLORIDE 8 MG/1
1 TABLET AS NEEDED TABLET, FILM COATED ORAL TWICE DAILY
Qty: 60 TABLETS | Refills: 5 | Status: ACTIVE | COMMUNITY
Start: 2024-01-22

## 2025-01-15 RX ORDER — ATOGEPANT 10 MG/1
1 TABLET TABLET ORAL ONCE A DAY
Status: ACTIVE | COMMUNITY

## 2025-01-15 RX ORDER — PREDNISONE 10 MG/1
TAKE 40 MG DAILY TABLET ORAL ONCE A DAY
Status: ACTIVE | COMMUNITY

## 2025-01-15 RX ORDER — VITAMIN A 2400 MCG
1 TABLET CAPSULE ORAL ONCE A DAY
Qty: 90 TABLET | Refills: 1 | Status: ACTIVE | COMMUNITY
Start: 2022-01-12

## 2025-01-15 RX ORDER — DICYCLOMINE HYDROCHLORIDE 20 MG/1
TAKE TWO TABLETS BY MOUTH FOUR TIMES A DAY FOR 30 DAYS TABLET ORAL
Qty: 240 TABLET | Refills: 11 | Status: ACTIVE | COMMUNITY

## 2025-01-15 NOTE — HPI-TODAY'S VISIT:
60 yo female with severe CD comes in for 4 month f/u Short gut seeing Dr. Taylor. EGD and bx showed nodules in duodenum-bx non-specific and GERD features of reflux at g-e jx. Nothing of concern.  Syx are stable. Nausea is constant. Eating is much better. Has nl weight, TPN is 12 hours every other day. Weight has increased from 107 to 130 or so.  Skyrizi (2 years) has helped in that, less pain, but feels better on it     =============================== 24  60 yo female comes in for 6 week f/u of severe Crohn's in context of ileostomy, short gut, and Skyrizi.  Doing better due to loads of antibiotics due to sepsis and loss of kidney infection.  Hosp 2 weeks.  Eating but appetitie is low. Force feeding. Drinking orgain. Does not like it. Last had TPN just befor she was hosp in early July and she hopes to resume TPN next week. Food is not enough. Can take more Orgain until she gets to next step. No appetie.  Has to go to clinic for labs. Needs to go to [facility].  Advised that she ask Dr. Taylor if she is a candidate for Gattex and will he treat her.     ======================= 23  60 yo female with severe Crohn's disease and likely shotr gut  is too sick for today's scheduled ov and is seen by telehealth.  Originally, she thought her low back needed surgery and there was possibly an abscess. [facility] admission 7/10- --admitted due to 1 week of back pain and found an infection due to her port which was removed. Getting high dose antibiotics-- Dr. Wilcox - ID. Many cx. Very little liquid from aspiration. Given iv vancomycin. Initially thought to have acute on chronic discitis/osteomyelitis involving L1 and L2. Soft tissue changes around the L5S1 vertebra. Spondylosis Gallstones  Crohn's disease is stable. Empties ostomy 8-10 times - stable. That is with Lomotil-6 4x a day. and immodium - 4 4x a day. Tried tincture of opium and it did not help. Discussed dicyclomine - to slow the gut-- she takes 3 Could increase dicyclomine to 2 qid - caution but might watch for side effects. Will check stool tests and leave the rest to others. re:TPN, she does not want it every day but qod.  ========================= 24  60 yo female returns for 3 month f/u visit.  She says she feels stable from a GI standpoint.  Juanita and I spoke 1.5 weeks ago about her nausea and I suggested stopping sulfasalazine rx by Dr. Mcdaniel and it resolved the nausea. Has joint aches of her right more than left hand.  Terrell, Hips and knees and feet.  CD meds: Skyrizia 360 mg q 8 weeks (??better than entyvio - blood work factor) dicyclomine no aza/6mp or mtx.  Stoma out puts 8-10 per day. lomotil 6 tabs qid and immodium 4 tab qid. LABS  hb 9.8    ======================================= 3/27/24  59 yo female with severe Crohn's and short gut returns for 2 month f/u.  Saw Dr. Taylor on  and he took 13 vials of blood. Told he needed zinc. He rx Lomotil 6 po qid with Immodium 4 tid. That is not controlling outputs better. He got her a book on Short Gut.  No ER visits in the last 2 montsh.. Wt is stable at 131.5. TPN is down to 10 hours qod. Eating normally. Emppties pouch 8-10 times a day. Daily abd pain which is intermittent. Arthritis is worse. Allergic to sulfa but tolerating sulfasalazine and getting 1 bid from dr. Mcdaniel.  Never had total body arthritis. Every body part aches. Thumbs and hands are a mess.  She is allergic to tape. May she take a week off TPN TPN labs from 3/21 look very good with alb 3.6.    ============================= 24  59 yo female with severe CD and short gut now on TPN got 3 weeks after being off it for 2 months, has appt with  Guzman Taylor , and now here for interval f/u.  Things overall going well. Empties pouch 8-10 per day. TPN is 24 hours a day, may get decreased to 18 per day. Has intermittent abdominal pain, daily, and takes pain medicine to control it. Using 800 mg motrin for joint pain and abdominal pain. she knows Motrin can flare Crohns disease.  Dicyclomine 20 mg qid. Takes lomotil in conjunction with lomotil. Wants to increase lomotil from 2 bid to 2 qid.  Labs pending.     ============================== 23  59 yo female returns for 2 month f/u. She has improved greatly on TPN. She had approximately 2-3 months of TPN and home health. On Skyrizi 360 mg sq q 8w.  Empties pouch 8-12 times a day. No ER visits for 2-3 months while receiving TPN. Consultation with Dr. Taylor at [facility] is pending and currently for 2024. I was encouraged that on Skyrizi the stool biomarkers are nl.  Ab to try some meat and other foods at this point and will see how it goes.  ================================== 23  59 yo female with Crohn's disease comes in for post hosp f/u.  Last resection was 2023 and diarrhea worse since then. 6 foot resection per Juanita.????  Hosp for a week and d/c 9 days ago.  Syx on admission. Weakness and low BP. Saw Dr. Reynoso who admitted her.  Has Passport in left arm. Infusions: TPN from ??????? Getting 1000 ml fluid a day. Getting TPN at Home is 16 hours a day  Percocet Methocarbinmol Folic acid Lomotil 3 bid Immodiuym Paxil Gabapentin  [facility] has tincture of opium. Appt with Dr. Reynoso  She likes the skyrizi.   ================================ 7/10/23  59 yo female with h/o Breast cancer and Crohn's disease which is severe.  Doing well on Skyrizi. Diarrhea is better as result of Skyrizi and surgery. Arthritis is better.  Issues: 1) Ostomy outputs are increased. taking more lomotil, increased lomotil from 2 to 3 per day and 3 immodium tid.  Consider tincture of opium.  Multiple checks for c diff - - never been positive.  Eating better. Blood pressure is low.  Offered a diecitian - declines.  Will use tincture of opium 5 drop qid in lieu of lomotil.    ============================ 23  58 yo female with severe CD. Had pain and vomiting and was admitted to [facility] 3/3 and had ileostomy revision and resection of 6 feet of intestin on 3/6/23 under Dr. Talamantes's care. Hosp 2.5 weeks.  Feeling so much better. Less pain.  still on oxycodone 7.5 - GP gives it to her. Hope is to get off pain. No steroids. Off budesonide Off mtx. Just got 2nd injection of Skyrizi  23===========  58 yo female with severe Crohn's on Skyrizi and budesonide 3mg one per day, methotrexate .5 cc qow --due to mouth ulcers, decreased from 1 cc per week, .5 cc per week and then the currnet .5 cc qow lomotil 2 bid Dicyclomine 4 (20)bid - - - should consider 2 qid Aciphex 1 bid  ESR has stayed elevated but CRP normalized on 22  Called our office Friday afternoon and spoke with Dr. Vuong. Called at 3:30p Friday and then called on call at 5p. c/o waking up with distension and hardness and gassy She thought it could be a partial blockage and so she took colace It sounded like a running faucet but has had regular output. Dr. Talamantes wants a CT stat - - to expedite. Problems: Severe Crohn's disease h/o Breast cancer High ostomy outputs abdominal pain Anorexia Weight loss - 3 lbs Immunosuppression Abdominal distension  ============================ 10/19/22  58 yo female for 2 month f/u. TH from home  Decision made to switch to Skyrizi.  Started Skyrizi in [place] about 6 weeks ago. Next appt is  - iv Dose 3 - - Likellt start 9;/15-Dose 1 Delay in getting Entyvio change to Skyrizi. She is not sure if the Skyrizi is working.   It was 4 weeks- - not excessive between last Entyvio and first Skyrizi.  Stooling is the same as 4 weeks ago. More abd pain Has heaviness and upper abdominal pain.   ====================================================== 22  58 yo female comes in with  Guzman for 3 week follow up.  She feels her Crohn's is better. Ileostomy outputs similar. 5-7 per day. No blood. Mouth is much better. Less pain. Rectum is in place. Mucus and stool per rectum. Blood per rectum She feels closing her up is not an answer.  Problems with back - - spinal stenosis - Guzman Barajas MD Resur[facility]. Hip and Knee hurt. Has an appointment with [facility].  Not eating much. Lost 6 more lbs. Been sick for 1.5 weeks - does not know. Headache and neck ache, could not lift head. Has been exhausted and dizzy. Can't walk in a straight line.  Has primary MD. Slade Auguste MD - Enedina Horner. Less steady when whe walks. Very weak. Difficulty getting here for the appointment.  Not eating because not hung  Meds: Entyvio 300 mg iv q 4w Gabapentin 300 mg po tid started 3 weeks ago Lomotil Paxil Dicyclomine Folic acid Ibuprofen Trazodone  For upcoming back fusion with Dr Jenn Adkinsgic to tape, levoquin, sulfa   ===================== 22  58 yo female with severe CD on Entyvio 300 mg iv q 4 w and mtx 12.5 mg sq q week Budesonide 3 mg a day and folic acid.  Main complaints are: Mouth ulcers Abdominal pain Diarrhea- empties bad 7 times Bad day more than 10/ No blood in stool. Nausea - continuous - awakens with nausea. Eats and nausea. PPI- Aciphex 20 mg bid. Trazodone. No other meds  Next infusion next week.   ======================= 22  57 yo female comes in for urgent f/u re; need for Promethazine 6.25 mg/5ml solution as this works better for her when she has acute nausea and nausea unrelieved by ondansetron. I feel this is an appropriate need and should be approved by insurance even though it is non-formulary.  MRE is pending and plan per radiology is to use barium as volumen as she vomits with it.  She experiences nausea every day due to bowel dysfunction from active Crohns disease and other factors.  Meds: Entyvio 300 mg iv q 4 w Methotrexate 1 cc per week. Wants to reduce to .5 cc per week and that is fine. Folic acid 1 mg a da\7 Budesonide 3 mg now, reduced from 9 m,g =============================== 22  57 yo female returns for 5 week f/u. Stoma is fully healed. No issues with the appliances. Emptying bag 5-6 times a day and coping.  She has been having abdominal pain for the past few weeks. It felt like someone was "trying to pull the uterus out" and she calls it scar tissue. There was nothing on US, to cause the pain. MRE from 21 showed ??problable low grade partial SBO but this may be dysfunction.  She came to see me for the iron deficiency anemia. She has noticed some bleeding. She still has urgency to move her bowels. Yesterday was scary becasue she saw blood in the rectal fluid. Passes rectal fluid 8-10 times a day. At night, it can be horrendous. Bottom of diaper is full.  On Entyvio 300 mg iv q 4 Methotrexate 1 cc q week Budesonide -- 3m 3 per day. -Was feeling bad 6 months ago or so and Bud was started. May be worth trying to taper   ============================= 22  57 yo female with CD and ileostomy comes in for 3 month f/u visit.  Finally healing from stoma revision 10/15/21.  Stoma never stuck out enough and kept  Dr. Talamantes did a stoma revision. Mild "complication" as skin around the stoma, stoma had a "moat" around it.  Empties bag 5-6 times. Interval check. Eating ok. Mouth is healing from the leucovorin to lessen the effect of mtx on mouth.  Getting guidance from Myerson.  ===================================== 10/6/21  57 yo female with Crohn's and avascular necrosis and saw Dr. Myerson 2 days ago and he did blood work has some plans - f/u in 3 weeks.  Sees Dr. Gardiner tomorrow to address major ostomy issues.  Considering ostomy revision. Retracted stoma.  Saw her mother and needed more supplies due to leaking.  Entocort now for 10 weeks, 6 mg a day, empties ostomy 6-9 times.  Has leaked but not in recent weeks.  Never on 9 mg and capri 6 mg for 10 weeks.  Try alternating 6 mg and 3 mg on alternate days.    ========================  21  57 yo female with CD now for 1 month f/u after telehealth and doing better 5 weeks into course of Entocort tx.  Does havd avascular necrosis of hips and understands there are risks of similar complications with Entocort therapy.  Was on fosamax. I just called Dr. Myerson to arrange an appoinment.  Ostomy - empties it 6 times so far today. Occasional abdominal pain.  On Entyvio every 4 weeks Entocort 9 mg a day x 5 weeks.  Asked to have CD4/CD8 checked, by sister's immunologist.   ===================== 8/3/21  57 yo female for  f/u due to beeing weak and in bed. In ER last week and they sent her home.  Meds: Entyvio 300 mg q 4 weeks Entocort x 1 week Off prednisone - had been on prednisone- she does not think it is what was helping her. On 1 cc mtx per week.  Devin Auguste is primary MD Saw him a month ago.  Main syx now.5 days ago was throwing up and was so distended. Could not poke her stomach. went to the ER Wednesday. N/V. Aciphex 1 twice daily. Last looked Never had an ulcer. Ileostomy in place.  Fever on Friday, up to 101. Eating now. Hungry yesterday  Not vomiting.  May neede EGD.     ====================== 21  56 yo female comes in for f/u.  She is "off" per Dr. Jaquez. She did an MRI. Dr. Jaquez reportedly thinks it is her meds. Has not seen a neurologist in years. Recent  labs were nl mostly with Hb 10.1, nl K-4.0 and Ca. Nl ESR and CRP, but had just finished medrol dose eliza.  Wants a Mg level. Labs to be sent to [facility]. Nl quantiferon.  Currently , getting Entyvio every 4 weeks.  Getting entyvio every 4 weeks. Taking 2 weeks off mtx due to stomatitis. If resuming, would reduce from 1 cc to .5 cc..  She is not sure how much she needs mtx and how much it has helped in the past. Mtx has very likely caused the stomatis. Or the pred medrol could have helped the mouth and belly. She has avn all over.  Myerson will see her and order bone scan  Addendum - - --21 Has thicker stools Has to change her bag because of high outputs She says her stoma is an outie  She is on pred 20 mg and should taper off it by going to 10 mg to 5 mg for a week and then d/c Risks of prednisone discussed.  Had dropped mtx from 25 to 12.5 and will go back up to 25 mg a week. Consider [facility] referral if not improvimg at that point. ============ 21  56 yo female with CD and ostomy for 5 month f/u.  Abd is good. Ostomy is functioning. Sleep thru much of night.  Appetite and intake is better. Oral CD problematic. Has been in touch with Dr. Pepe - - swish and spit.  Max-- 150. Current weight is 121.  Not taking supplements -- Boost and Ensure. 2-3 cans The y don't agree with her.. R Lactose free ice  She wants low dose prednisone. Lets try 2.5-5 mg a day.  ================== 20  56 yo female for 3 week TH f/u. Has upper abdominal pain that may be due to gallstones. Had referred her to Dr. Moreland but she did not call. She heard he was retiring and not. Has abdominal pain when she. Left upper quadrant or "upper rib cage" mostly left. He knows Dr. Talamantes. - Frustrated- - blisters on lip. Torn open. Has not tried steroid rinse or paste in past. Magic mouth wash helps. Does not reduce the blisters. Dr. Best- non steroid options for mouth   ======================== 10/23/20  10/23 56 yo female for 3 week TH f/u. Has oral Crohn's and I-C and stoma issues. Entyvio may not cover the EIM. Might try oral steroid lozenge - - pred paste and lozenges.  Entyvio 300 q 4 weeks Mtx 1 cc q week Pred 5 mg may stop. Bentyl -  Abd pain is ok No idea what triggered her admission Food induced -  Consider medrol dose eliza in the future  Extensive lab review CBC - - Hct 31.6/Hb 10.9 plt 120 CRP 22 ESR 56 Iron 33% Ferrintin good ================================================== 10/2/20  56 yo female with Crohn's disease recently hosp 3 days at  for abd pain, N/V and got better with 3 dasy 3 days of iv steroids.  Surgery considered.  Was on 40 mg pred - intolerant anxiety  Now dropped to 30 mg a day. Now is 50-60% better than admission.  Surgery debated gallstones. They leaned toward blockage.  Has been on Stelara. Entyvio works better than Stelara. Mtx makes her tired She would like to up the dose of lomotil.  20  53 yo female with Crohnn's disease having continued active syx. Getting Entyvio every 4 weeks. Methotrexate 12.5 mg per week Dicyclomine tablets 2 po qid  == She has taken Xifaxan and it tears her up. Then stop it. Does not like flagyl and does not help.  Pepto 1-2 doses a day, does help the diarreha. She uses Immodium every day.  Stooling - - - empties bag 6-10 times a day. Abdominal pain - when she eats and intermittent. Energy - gone.  Labs- Anemia - Hb lower CRP is higher - -34 (was 16).   ========================== 20  Crohn's disease.   53 yo female with Crohn's disease comes in for f/u.  Dr. Jaquez is considering a GYN etiology.  Needs a pouch exam.  Getting Entyvio every 4 weeks.  Stelara did not work as well as Entyvio.  Normally when she gets an infusion, she gets a bounce and has not.  Prometheus Vedo level 3/25, just before 4 week dose was great_ __ _32.5 and no antibodies.  Feb 10 labs looked good CRP 29.9 and cbc looked fine. ESR elevation.  2 liters of miralax_ __ _ Try 6-8am _ __ _- and then an exam at 2-3 pm She asked about a pilonidal cyst and I directed to Dr. Albin Owen  Try pepto bismol 1-2 prn on days.    ===== 20  53 yo female comes in at my request for 3 week f/u of Crohn's due to new 4 x eleation of CRP at 29.9.  19 CRP was similarly elevated 4x at 2.1/.5  Leonid is checking CT abd-pelvis due to pelvic pain which is not characteristic of her Crohn's.  She is wondering about GYN. Or spastic bladder.  Dr. Talamantes thought stable but wants to scope to be sure.  Empties bag 10x a day.That is more than 3-6 months ago and has leakage of the ostomy bag. Maybe more volume. Very watery.  Review of labs from 2/10 show cRP 29.i9 but others are stable. CBC looks good with trace elevation of wbc and borderline anemia. Getting iv iron from Arianna Jaquez. Vit D is normal at 39. Albumin is normal at 4.5.  MRI 10/1/19 showed gallstones , present in past, and she has mild pain that is intermittent Liver tests are nl. Can assess with HIDA, and/or with surgical consult, e.g ???relative need for choly, high vs. low.    ================ 20  53 yo female with severe Crohn's disease and h/o breast cancer _ _ Arianna Jaquez MD on Letrasol. 10th year.  Abdominal pain is confusing. Has gotten pain in the ovary and in the uterus. GYN-Ann Laird MD and did US was fine. Sent to PT for pelvic treatment.  At her comment, I reviewed [facility] imaging 16 nl CTE 5/3/17 nl [facility] US Oct 2 2019 "Cholelithiasis" o/w MRE was nl - no inflammation. BUT THE MRI DID SHOW "PERSISTENT TETHERING OF UTERUS TO RECTAL STUMP"  On Entyvio 300 mg q 4 weeks. Next dose in 2 weeks On mtx _ _.5 cc. Monthly b12 shots  Eating ok, some weight loss. Weight stable x 6 months and down 30 lb since _ __ _- she is comfortable with current weight.  ============= 19  53 yo female with severe Crohn's and h/o breast cancer comes in for 3 month f/u.  Doing poorly.  Daily abd pain which is intermittent.  When she eats she gets pain and nausea.  Taking zofran and phenergan.  Sleep pattern is awry.  Empties bag 5 times a day and going out of rectum.  Has a loop ileostomy that may be problematic.  Allergic to tape.  Currently on Entyvio 300 mg q 8 weeks.  Stelara did not work as well.  Discussed the entyvio at shorter cycles of q 4 weeks to see if that will control syx better.  Outputs are higher and she may be volume depleted and maybe more entyvio will help.  last entyvio was last week and so a level won't be useful today. Will just proceed with dose increase.    ============== 18 53 yo female with Crohn's disease and breast cancer and diverting ileostomy from Dr. Talamantes.  Skin issue - addressed by Dr. Best.  See Brooke Talamantes MD and enterostomal therapist.  AGWS.  No fcs.     Review of Systems ConstitutionalDenies : body aches, fever, weight gain, weight loss CardiovascularDenies : chest pain, heart racing/skipping, high blood pressure RespiratoryDenies : chronic cough, shortness of breath, wheezing or asthma symptoms GastrointestinalDenies : Abdominal Pain/discomfort, Anal/Rectal Pain or Itching, Anal Spasm, Black Stool, Bloating/belching/gaseouness, Change of Bowel Habit, Constipation, Diarrhea/Loose Stool, Difficulty in Swallowing, Heartburn/esophageal reflux, Hemorrhoids, Indigestion, Mucus in Stool, Nausea/vomiting, Rectal Bleeding, Unintentional Weight Loss   Vitals Date Time BP Position Site L\R Cuff Size HR RR TEMP (F) WT HT BMI kg/m2 BSA m2 O2 Sat HC  2020 05:15 PM 133lbs 0oz 5' 8.5" 19.93 1.71       Assessment Crohn's Disease 555.2  Diarrhea 787.91/R19.7  Cholelithiasis 574.20/K80.20  Immunosuppression due to drug therapy V58.69/Z79.899   Problems Reconciled Plan OrdersPatient not identified as an unhealthy alcohol user when screene () - - 2020 Influenza immunization administered or previously received () - - 2020 BMI screening above normal () - - 2020 Current tobacco non-user (CAD, cap, COPD, PV) (dm) (IBD) (1036F, ) - - 2020 Colorectal cancer screening results documented and reviewed (PV) (3017F) - - 2020 Documentation of current medications. () - - 2020 EGD w/Biopsy if indicated (71229) - - 2020 Pouchoscopy (77457) - - 2020 CMP (comprehensive metabolic panel) (49849) - - 2020 Sed rate (78690) - - 2020 C-reactive protein (07300) - - 2020 Ferritin assay (80296) - - 2020 Iron + iron binding capacity panel ser/plas (90799, 71530) - - 2020 Vitamin B12 and folate measurement (16185, 48870) - - 2020 CBC with diff (01967) - - 2020 25-OH-Vitamin D (51341) - - 2020 GPP 22 (FilmArray) - Gastrointestinal Pathogen Panel - QDx (40982) - - 2020 MedicationsMedications have been Reconciled Transition of Care or Provider Policy InstructionsPatient seen today via telehealth by agreement and consent of patient in light of current COVID-19 pandemic. I used video conferencing during the visit. The patient encounter is appropriate and reasonable under the circumstances given the patient's particular presentation at this time. The patient has been advised of the followin) the potential risks and limitations of this mode of treatment (including but not limited to the absence of in-person examination); 2) the right to refuse telehealth services at any point without affecting the right to future care; 3) the right to receive in-person services, included immediately after this consultation if an urgent need arises; 4) information, including identifiable images or information from this telehealth consult, will only be shared in accordance with HIPPA regulations. Any and all of the patient's and/or patient's family member's questions on this issue have been answered. The patient has verbally consented to be treated via telehealth services. The patient has also been advised to contact this office for worsening conditions or problems, and seek emergency medical treatment and/or call 911 if the patient deems either necessary. (For commercial payers, telehealth video only) More than half of the face-to-face time used for counseling and coordination of care. 40 min appointment (69452 EP) I have documented a list of current medications and reviewed it with the patient. I encouraged the patient to diet and exercise. DispositionReturn To Clinic in 2 Months CorrespondenceCC this document (Slade Auguste MD, Brooke Talamantes MD, Arianna Jaquez MD) - 2020  99334 exam med 32 min time  needs refill or lomotil Can't eRx lomotil.  Aciphex refill.  Needs EGD and pouchoscopy        Electronically Signed by: Sánchez Perez MD -A  cc: note to Dr. Brooke Talamantes 23

## 2025-03-07 ENCOUNTER — TELEPHONE ENCOUNTER (OUTPATIENT)
Dept: URBAN - METROPOLITAN AREA CLINIC 98 | Facility: CLINIC | Age: 60
End: 2025-03-07

## 2025-03-07 RX ORDER — SULFASALAZINE 500 MG/1
2 TABLETS TABLET ORAL TWICE DAILY
Status: ACTIVE | COMMUNITY

## 2025-03-07 RX ORDER — PREDNISONE 10 MG/1
TAKE 40 MG DAILY TABLET ORAL ONCE A DAY
Status: DISCONTINUED | COMMUNITY

## 2025-03-07 RX ORDER — DIPHENOXYLATE HYDROCHLORIDE AND ATROPINE SULFATE 2.5; .025 MG/1; MG/1
1 TABLET AS NEEDED TABLET ORAL
Status: ACTIVE | COMMUNITY
Start: 2024-05-31

## 2025-03-07 RX ORDER — ONDANSETRON 8 MG/1
1 TABLET ON THE TONGUE AND ALLOW TO DISSOLVE AS NEEDED TABLET, ORALLY DISINTEGRATING ORAL TWICE DAILY
Qty: 60 TABLETS | Refills: 11
Start: 2024-01-24

## 2025-03-07 RX ORDER — IBUPROFEN 200 MG/1
TAKE 1 CAPSULE (200 MG) BY ORAL ROUTE EVERY 6 HOURS AS NEEDED CAPSULE, LIQUID FILLED ORAL
Qty: 0 | Refills: 0 | Status: ACTIVE | COMMUNITY
Start: 1900-01-01

## 2025-03-07 RX ORDER — LETROZOLE TABLETS 2.5 MG/1
TAKE 1 TABLET (2.5 MG) BY ORAL ROUTE ONCE DAILY TABLET, FILM COATED ORAL 1
Qty: 0 | Refills: 0 | Status: ACTIVE | COMMUNITY
Start: 1900-01-01

## 2025-03-07 RX ORDER — ALENDRONATE SODIUM 70 MG
TABLET ORAL
Qty: 0 | Refills: 0 | Status: ACTIVE | COMMUNITY
Start: 1900-01-01

## 2025-03-07 RX ORDER — RABEPRAZOLE SODIUM 20 MG/1
TAKE 1 TABLET BY MOUTH TWICE DAILY TABLET, DELAYED RELEASE ORAL
Qty: 180 | Refills: 3 | Status: ACTIVE | COMMUNITY

## 2025-03-07 RX ORDER — LEUCOVORIN CALCIUM 10 MG/1
1 TABLET TABLET ORAL ONCE A DAY
Status: ACTIVE | COMMUNITY

## 2025-03-07 RX ORDER — PROMETHAZINE HYDROCHLORIDE 6.25 MG/5ML
TAKE 5 ML BY MOUTH AT BEDTIME SOLUTION ORAL
Qty: 150 ML | Refills: 11 | Status: ACTIVE | COMMUNITY
End: 2026-01-03

## 2025-03-07 RX ORDER — ONDANSETRON 8 MG/1
1 TABLET ON THE TONGUE AND ALLOW TO DISSOLVE AS NEEDED TABLET, ORALLY DISINTEGRATING ORAL TWICE DAILY
Qty: 60 TABLETS | Refills: 11 | Status: ACTIVE | COMMUNITY
Start: 2024-01-24

## 2025-03-07 RX ORDER — ONDANSETRON 8 MG/1
1 TABLET ON THE TONGUE AND ALLOW TO DISSOLVE AS NEEDED TABLET, ORALLY DISINTEGRATING ORAL ONCE A DAY
Qty: 30 TABLETS | Refills: 11
Start: 2024-01-24

## 2025-03-07 RX ORDER — ATOGEPANT 10 MG/1
1 TABLET TABLET ORAL ONCE A DAY
Status: ACTIVE | COMMUNITY

## 2025-03-07 RX ORDER — RISANKIZUMAB-RZAA 360 MG/2.4
360 MG WEARABLE INJECTOR SUBCUTANEOUS
Qty: 1 KIT | Refills: 8 | Status: ACTIVE | COMMUNITY
Start: 2022-10-19 | End: 2026-02-19

## 2025-03-07 RX ORDER — ONDANSETRON HYDROCHLORIDE 8 MG/1
1 TABLET AS NEEDED TABLET, FILM COATED ORAL TWICE DAILY
Qty: 60 TABLETS | Refills: 11
Start: 2024-01-22

## 2025-03-07 RX ORDER — LOPERAMIDE HCL 2 MG
1 CAPSULE AS NEEDED CAPSULE ORAL
Status: ACTIVE | COMMUNITY

## 2025-03-07 RX ORDER — ONDANSETRON HYDROCHLORIDE 8 MG/1
1 TABLET AS NEEDED TABLET, FILM COATED ORAL TWICE DAILY
Qty: 60 TABLETS | Refills: 11 | Status: DISCONTINUED | COMMUNITY
Start: 2024-01-22

## 2025-03-07 RX ORDER — VITAMIN A 2400 MCG
1 TABLET CAPSULE ORAL ONCE A DAY
Qty: 90 TABLET | Refills: 1 | Status: ACTIVE | COMMUNITY
Start: 2022-01-12

## 2025-03-07 RX ORDER — IBUPROFEN 100 MG/1
TAKE 2 TABLETS (200 MG) BY ORAL ROUTE EVERY 6 HOURS AS NEEDED WITH FOOD TABLET, COATED ORAL
Qty: 0 | Refills: 0 | Status: ACTIVE | COMMUNITY
Start: 1900-01-01

## 2025-03-07 RX ORDER — METHOCARBAMOL 500 MG/1
1.5 TABLETS TABLET ORAL
Status: ACTIVE | COMMUNITY

## 2025-03-07 RX ORDER — PROMETHAZINE HYDROCHLORIDE 25 MG/1
TAKE 1 TABLETS TABLET ORAL BID
Qty: 60 TABLETS | Refills: 5 | Status: ACTIVE | COMMUNITY
Start: 2021-02-12

## 2025-03-07 RX ORDER — OXYCODONE HYDROCHLORIDE AND ACETAMINOPHEN 7.5; 325 MG/1; MG/1
1 TABLET AS NEEDED TABLET ORAL
Refills: 0 | Status: DISCONTINUED | COMMUNITY

## 2025-03-07 RX ORDER — SULFASALAZINE 500 MG/1
2 TABLETS TABLET ORAL TWICE DAILY
Status: DISCONTINUED | COMMUNITY

## 2025-03-07 RX ORDER — FOLIC ACID 1 MG/1
1 TABLET TABLET ORAL ONCE A DAY
Qty: 30 TABLET | Refills: 11 | Status: ACTIVE | COMMUNITY
End: 2026-01-03

## 2025-03-07 RX ORDER — DICYCLOMINE HYDROCHLORIDE 20 MG/1
TAKE TWO TABLETS BY MOUTH FOUR TIMES A DAY FOR 30 DAYS TABLET ORAL
Qty: 240 TABLET | Refills: 11 | Status: ACTIVE | COMMUNITY

## 2025-03-12 ENCOUNTER — TELEPHONE ENCOUNTER (OUTPATIENT)
Dept: URBAN - METROPOLITAN AREA CLINIC 98 | Facility: CLINIC | Age: 60
End: 2025-03-12

## 2025-03-12 RX ORDER — RABEPRAZOLE SODIUM 20 MG/1
TAKE 1 TABLET BY MOUTH TWICE DAILY TABLET, DELAYED RELEASE ORAL
Qty: 180 TABLETS | Refills: 3

## 2025-03-28 ENCOUNTER — TELEPHONE ENCOUNTER (OUTPATIENT)
Dept: URBAN - METROPOLITAN AREA CLINIC 98 | Facility: CLINIC | Age: 60
End: 2025-03-28

## 2025-03-28 RX ORDER — RABEPRAZOLE SODIUM 20 MG/1
TAKE 1 TABLET BY MOUTH TWICE DAILY TABLET, DELAYED RELEASE ORAL
Qty: 180 TABLETS | Refills: 3

## 2025-04-14 ENCOUNTER — TELEPHONE ENCOUNTER (OUTPATIENT)
Dept: URBAN - METROPOLITAN AREA CLINIC 98 | Facility: CLINIC | Age: 60
End: 2025-04-14

## 2025-04-14 RX ORDER — ERGOCALCIFEROL CAPSULES, 1.25 MG/1
1 CAPSULE CAPSULE ORAL
Qty: 12 CAPSULES | Refills: 3

## 2025-05-05 ENCOUNTER — TELEPHONE ENCOUNTER (OUTPATIENT)
Dept: URBAN - METROPOLITAN AREA CLINIC 98 | Facility: CLINIC | Age: 60
End: 2025-05-05

## 2025-05-05 RX ORDER — ONDANSETRON 8 MG/1
1 TABLET ON THE TONGUE AND ALLOW TO DISSOLVE AS NEEDED TABLET, ORALLY DISINTEGRATING ORAL TWICE DAILY
Qty: 60 TABLETS | Refills: 11
Start: 2024-01-24

## 2025-05-06 ENCOUNTER — TELEPHONE ENCOUNTER (OUTPATIENT)
Dept: URBAN - METROPOLITAN AREA CLINIC 98 | Facility: CLINIC | Age: 60
End: 2025-05-06

## 2025-05-06 RX ORDER — RABEPRAZOLE SODIUM 20 MG/1
TAKE 1 TABLET BY MOUTH TWICE DAILY TABLET, DELAYED RELEASE ORAL
Qty: 180 TABLETS | Refills: 3

## 2025-05-27 ENCOUNTER — TELEPHONE ENCOUNTER (OUTPATIENT)
Dept: URBAN - METROPOLITAN AREA CLINIC 98 | Facility: CLINIC | Age: 60
End: 2025-05-27

## 2025-06-25 ENCOUNTER — OFFICE VISIT (OUTPATIENT)
Dept: URBAN - METROPOLITAN AREA CLINIC 98 | Facility: CLINIC | Age: 60
End: 2025-06-25
Payer: COMMERCIAL

## 2025-06-25 ENCOUNTER — LAB OUTSIDE AN ENCOUNTER (OUTPATIENT)
Dept: URBAN - METROPOLITAN AREA CLINIC 98 | Facility: CLINIC | Age: 60
End: 2025-06-25

## 2025-06-25 DIAGNOSIS — E83.42 HYPOMAGNESEMIA: ICD-10-CM

## 2025-06-25 DIAGNOSIS — K21.9 CHRONIC GERD: ICD-10-CM

## 2025-06-25 DIAGNOSIS — K50.80 CROHN'S DISEASE OF BOTH SMALL AND LARGE INTESTINE: ICD-10-CM

## 2025-06-25 DIAGNOSIS — E87.5 SERUM POTASSIUM ELEVATED: ICD-10-CM

## 2025-06-25 DIAGNOSIS — R63.4 WEIGHT LOSS: ICD-10-CM

## 2025-06-25 DIAGNOSIS — Z93.2 ILEOSTOMY IN PLACE: ICD-10-CM

## 2025-06-25 DIAGNOSIS — E44.0 MODERATE PROTEIN-CALORIE MALNUTRITION: ICD-10-CM

## 2025-06-25 DIAGNOSIS — M86.9 OSTEOMYELITIS, UNSPECIFIED SITE, UNSPECIFIED TYPE: ICD-10-CM

## 2025-06-25 DIAGNOSIS — K50.90 CROHN DISEASE: ICD-10-CM

## 2025-06-25 DIAGNOSIS — A04.8 H. PYLORI INFECTION: ICD-10-CM

## 2025-06-25 PROCEDURE — 99215 OFFICE O/P EST HI 40 MIN: CPT | Performed by: INTERNAL MEDICINE

## 2025-06-25 RX ORDER — ONDANSETRON 8 MG/1
1 TABLET ON THE TONGUE AND ALLOW TO DISSOLVE AS NEEDED TABLET, ORALLY DISINTEGRATING ORAL TWICE DAILY
Qty: 60 TABLETS | Refills: 11 | Status: ACTIVE | COMMUNITY
Start: 2024-01-24

## 2025-06-25 RX ORDER — DICYCLOMINE HYDROCHLORIDE 20 MG/1
1 TABLET TABLET ORAL THREE TIMES A DAY
Qty: 90 TABLET | Refills: 11 | Status: ACTIVE | COMMUNITY

## 2025-06-25 RX ORDER — PROMETHAZINE HYDROCHLORIDE 6.25 MG/5ML
TAKE 5 ML BY MOUTH AT BEDTIME SOLUTION ORAL
Qty: 150 ML | Refills: 11 | Status: ACTIVE | COMMUNITY
End: 2026-01-03

## 2025-06-25 RX ORDER — FOLIC ACID 1 MG/1
1 TABLET TABLET ORAL ONCE A DAY
Qty: 30 TABLET | Refills: 11 | Status: ACTIVE | COMMUNITY
End: 2026-01-03

## 2025-06-25 RX ORDER — RISANKIZUMAB-RZAA 360 MG/2.4
360 MG WEARABLE INJECTOR SUBCUTANEOUS
Qty: 1 KIT | Refills: 8 | Status: ACTIVE | COMMUNITY
Start: 2022-10-19 | End: 2026-02-19

## 2025-06-25 RX ORDER — BACLOFEN 10 MG/1
1 TABLET AS NEEDED TABLET ORAL ONCE A DAY
Status: ACTIVE | COMMUNITY

## 2025-06-25 RX ORDER — LEUCOVORIN CALCIUM 10 MG/1
1 TABLET TABLET ORAL ONCE A DAY
Status: ACTIVE | COMMUNITY

## 2025-06-25 RX ORDER — RABEPRAZOLE SODIUM 20 MG/1
TAKE 1 TABLET BY MOUTH TWICE DAILY TABLET, DELAYED RELEASE ORAL
Qty: 180 TABLETS | Refills: 3 | Status: ACTIVE | COMMUNITY

## 2025-06-25 RX ORDER — ERGOCALCIFEROL CAPSULES, 1.25 MG/1
1 CAPSULE CAPSULE ORAL
Qty: 12 CAPSULES | Refills: 3 | Status: ACTIVE | COMMUNITY

## 2025-06-25 RX ORDER — PROMETHAZINE HYDROCHLORIDE 25 MG/1
TAKE 1 TABLETS TABLET ORAL BID
Qty: 60 TABLETS | Refills: 5 | Status: ACTIVE | COMMUNITY
Start: 2021-02-12

## 2025-06-25 RX ORDER — LOPERAMIDE HCL 2 MG
5 CAPSULES CAPSULE ORAL
Status: ACTIVE | COMMUNITY

## 2025-06-25 RX ORDER — DIPHENOXYLATE HYDROCHLORIDE AND ATROPINE SULFATE 2.5; .025 MG/1; MG/1
6 TABLETS TABLET ORAL
Status: ACTIVE | COMMUNITY
Start: 2024-05-31

## 2025-06-25 NOTE — HPI-TODAY'S VISIT:
59 yo female returns for 5 month f/u. Doing well. Did fine on recent trip. Was away 10 days.  Overall doing well. Has abd pain. On Skyrizi.  took last dose around . Has pain in epigastric are, does not know why. Crohn's is doing welll. Feels good. Feels like a nl person. Gets rash from sulfa Saw Dr. Mcdaniel  Although not in an arthritic flare Getting out of medrol dose pack in March. Has taken Tumeric or curcumin Dr. Mcdaniel said Rinvoq a possiblly Worst reaction to sulfasal rash itchingk  Dicussed Nature Made Curcumin 400 mg po bid to try  Has a hi K and needs TPN orders.  Therese Oleary ---- [phone]  Would let Dr. Angeles clements as well so he can manage. Was K in iv fluids, Not sure why.  Drawn out of PICC line  Currently has epigastric On aciphex, Gallbladder in Stones. On aciphex 20 mg bid    ======================= 1/15/25  60 yo female with severe CD comes in for 4 month f/u Short gut seeing Dr. Taylor. EGD and bx showed nodules in duodenum-bx non-specific and GERD features of reflux at g-e jx. Nothing of concern.  Syx are stable. Nausea is constant. Eating is much better. Has nl weight, TPN is 12 hours every other day. Weight has increased from 107 to 130 or so.  Skyrizi (2 years) has helped in that, less pain, but feels better on it     =============================== 24  60 yo female comes in for 6 week f/u of severe Crohn's in context of ileostomy, short gut, and Skyrizi.  Doing better due to loads of antibiotics due to sepsis and loss of kidney infection.  Hosp 2 weeks.  Eating but appetitie is low. Force feeding. Drinking orgain. Does not like it. Last had TPN just befor she was hosp in early July and she hopes to resume TPN next week. Food is not enough. Can take more Orgain until she gets to next step. No appetie.  Has to go to clinic for labs. Needs to go to [facility].  Advised that she ask Dr. Taylor if she is a candidate for Gattex and will he treat her.     ======================= 23  60 yo female with severe Crohn's disease and likely shotr gut  is too sick for today's scheduled ov and is seen by telehealth.  Originally, she thought her low back needed surgery and there was possibly an abscess. [facility] admission 7/10- --admitted due to 1 week of back pain and found an infection due to her port which was removed. Getting high dose antibiotics-- Dr. Wilcox - ID. Many cx. Very little liquid from aspiration. Given iv vancomycin. Initially thought to have acute on chronic discitis/osteomyelitis involving L1 and L2. Soft tissue changes around the L5S1 vertebra. Spondylosis Gallstones  Crohn's disease is stable. Empties ostomy 8-10 times - stable. That is with Lomotil-6 4x a day. and immodium - 4 4x a day. Tried tincture of opium and it did not help. Discussed dicyclomine - to slow the gut-- she takes 3 Could increase dicyclomine to 2 qid - caution but might watch for side effects. Will check stool tests and leave the rest to others. re:TPN, she does not want it every day but qod.  ========================= 24  60 yo female returns for 3 month f/u visit.  She says she feels stable from a GI standpoint.  Juanita and I spoke 1.5 weeks ago about her nausea and I suggested stopping sulfasalazine rx by Dr. Mcdaniel and it resolved the nausea. Has joint aches of her right more than left hand.  Terrell, Hips and knees and feet.  CD meds: Skyrizia 360 mg q 8 weeks (??better than entyvio - blood work factor) dicyclomine no aza/6mp or mtx.  Stoma out puts 8-10 per day. lomotil 6 tabs qid and immodium 4 tab qid. LABS  hb 9.8    ======================================= 3/27/24  57 yo female with severe Crohn's and short gut returns for 2 month f/u.  Saw Dr. Taylor on  and he took 13 vials of blood. Told he needed zinc. He rx Lomotil 6 po qid with Immodium 4 tid. That is not controlling outputs better. He got her a book on Short Gut.  No ER visits in the last 2 montsh.. Wt is stable at 131.5. TPN is down to 10 hours qod. Eating normally. Emppties pouch 8-10 times a day. Daily abd pain which is intermittent. Arthritis is worse. Allergic to sulfa but tolerating sulfasalazine and getting 1 bid from dr. Mcdaniel.  Never had total body arthritis. Every body part aches. Thumbs and hands are a mess.  She is allergic to tape. May she take a week off TPN TPN labs from 3/21 look very good with alb 3.6.    ============================= 24  57 yo female with severe CD and short gut now on TPN got 3 weeks after being off it for 2 months, has appt with  Guzman Taylor , and now here for interval f/u.  Things overall going well. Empties pouch 8-10 per day. TPN is 24 hours a day, may get decreased to 18 per day. Has intermittent abdominal pain, daily, and takes pain medicine to control it. Using 800 mg motrin for joint pain and abdominal pain. she knows Motrin can flare Crohns disease.  Dicyclomine 20 mg qid. Takes lomotil in conjunction with lomotil. Wants to increase lomotil from 2 bid to 2 qid.  Labs pending.     ============================== 23  57 yo female returns for 2 month f/u. She has improved greatly on TPN. She had approximately 2-3 months of TPN and home health. On Skyrizi 360 mg sq q 8w.  Empties pouch 8-12 times a day. No ER visits for 2-3 months while receiving TPN. Consultation with Dr. Taylor at [facility] is pending and currently for 2024. I was encouraged that on Skyrizi the stool biomarkers are nl.  Ab to try some meat and other foods at this point and will see how it goes.  ================================== 23  57 yo female with Crohn's disease comes in for post hosp f/u.  Last resection was 2023 and diarrhea worse since then. 6 foot resection per Juanita.????  Hosp for a week and d/c 9 days ago.  Syx on admission. Weakness and low BP. Saw Dr. Reynoso who admitted her.  Has Passport in left arm. Infusions: TPN from ??????? Getting 1000 ml fluid a day. Getting TPN at Home is 16 hours a day  Percocet Methocarbinmol Folic acid Lomotil 3 bid Immodiuym Paxil Gabapentin  [facility] has tincture of opium. Appt with Dr. Reynoso  She likes the skyrizi.   ================================ 7/10/23  57 yo female with h/o Breast cancer and Crohn's disease which is severe.  Doing well on Skyrizi. Diarrhea is better as result of Skyrizi and surgery. Arthritis is better.  Issues: 1) Ostomy outputs are increased. taking more lomotil, increased lomotil from 2 to 3 per day and 3 immodium tid.  Consider tincture of opium.  Multiple checks for c diff - - never been positive.  Eating better. Blood pressure is low.  Offered a diecitian - declines.  Will use tincture of opium 5 drop qid in lieu of lomotil.    ============================ 23  58 yo female with severe CD. Had pain and vomiting and was admitted to [facility] 3/3 and had ileostomy revision and resection of 6 feet of intestin on 3/6/23 under Dr. Talamantes's care. Hosp 2.5 weeks.  Feeling so much better. Less pain.  still on oxycodone 7.5 - GP gives it to her. Hope is to get off pain. No steroids. Off budesonide Off mtx. Just got 2nd injection of Skyrizi  23===========  58 yo female with severe Crohn's on Skyrizi and budesonide 3mg one per day, methotrexate .5 cc qow --due to mouth ulcers, decreased from 1 cc per week, .5 cc per week and then the currnet .5 cc qow lomotil 2 bid Dicyclomine 4 (20)bid - - - should consider 2 qid Aciphex 1 bid  ESR has stayed elevated but CRP normalized on 22  Called our office Friday afternoon and spoke with Dr. Vuong. Called at 3:30p Friday and then called on call at 5p. c/o waking up with distension and hardness and gassy She thought it could be a partial blockage and so she took colace It sounded like a running faucet but has had regular output. Dr. Talamantes wants a CT stat - - to expedite. Problems: Severe Crohn's disease h/o Breast cancer High ostomy outputs abdominal pain Anorexia Weight loss - 3 lbs Immunosuppression Abdominal distension  ============================ 10/19/22  58 yo female for 2 month f/u. TH from home  Decision made to switch to Skyrizi.  Started Skyrizi in [place] about 6 weeks ago. Next appt is  - iv Dose 3 - - Likellt start 9;/15-Dose 1 Delay in getting Entyvio change to Skyrizi. She is not sure if the Skyrizi is working.   It was 4 weeks- - not excessive between last Entyvio and first Skyrizi.  Stooling is the same as 4 weeks ago. More abd pain Has heaviness and upper abdominal pain.   ====================================================== 22  58 yo female comes in with  Guzman for 3 week follow up.  She feels her Crohn's is better. Ileostomy outputs similar. 5-7 per day. No blood. Mouth is much better. Less pain. Rectum is in place. Mucus and stool per rectum. Blood per rectum She feels closing her up is not an answer.  Problems with back - - spinal stenosis - Guzman Barajas MD [facility]. Hip and Knee hurt. Has an appointment with [facility].  Not eating much. Lost 6 more lbs. Been sick for 1.5 weeks - does not know. Headache and neck ache, could not lift head. Has been exhausted and dizzy. Can't walk in a straight line.  Has primary MD. Slade Auguste MD - [place]. Less steady when whe walks. Very weak. Difficulty getting here for the appointment.  Not eating because not hung  Meds: Entyvio 300 mg iv q 4w Gabapentin 300 mg po tid started 3 weeks ago Lomotil Paxil Dicyclomine Folic acid Ibuprofen Trazodone  For upcoming back fusion with Dr Jenn Adkinsgic to tape, levoquin, sulfa   ===================== 22  58 yo female with severe CD on Entyvio 300 mg iv q 4 w and mtx 12.5 mg sq q week Budesonide 3 mg a day and folic acid.  Main complaints are: Mouth ulcers Abdominal pain Diarrhea- empties bad 7 times Bad day more than 10/ No blood in stool. Nausea - continuous - awakens with nausea. Eats and nausea. PPI- Aciphex 20 mg bid. Trazodone. No other meds  Next infusion next week.   ======================= 22  55 yo female comes in for urgent f/u re; need for Promethazine 6.25 mg/5ml solution as this works better for her when she has acute nausea and nausea unrelieved by ondansetron. I feel this is an appropriate need and should be approved by insurance even though it is non-formulary.  MRE is pending and plan per radiology is to use barium as volumen as she vomits with it.  She experiences nausea every day due to bowel dysfunction from active Crohns disease and other factors.  Meds: Entyvio 300 mg iv q 4 w Methotrexate 1 cc per week. Wants to reduce to .5 cc per week and that is fine. Folic acid 1 mg a da\7 Budesonide 3 mg now, reduced from 9 m,g =============================== 22  55 yo female returns for 5 week f/u. Stoma is fully healed. No issues with the appliances. Emptying bag 5-6 times a day and coping.  She has been having abdominal pain for the past few weeks. It felt like someone was "trying to pull the uterus out" and she calls it scar tissue. There was nothing on US, to cause the pain. MRE from 21 showed ??problable low grade partial SBO but this may be dysfunction.  She came to see me for the iron deficiency anemia. She has noticed some bleeding. She still has urgency to move her bowels. Yesterday was scary becasue she saw blood in the rectal fluid. Passes rectal fluid 8-10 times a day. At night, it can be horrendous. Bottom of diaper is full.  On Entyvio 300 mg iv q 4 Methotrexate 1 cc q week Budesonide -- 3m 3 per day. -Was feeling bad 6 months ago or so and Bud was started. May be worth trying to taper   ============================= 22  55 yo female with CD and ileostomy comes in for 3 month f/u visit.  Finally healing from stoma revision 10/15/21.  Stoma never stuck out enough and kept  Dr. Talamantes did a stoma revision. Mild "complication" as skin around the stoma, stoma had a "moat" around it.  Empties bag 5-6 times. Interval check. Eating ok. Mouth is healing from the leucovorin to lessen the effect of mtx on mouth.  Getting guidance from Myerson.  ===================================== 10/6/21  55 yo female with Crohn's and avascular necrosis and saw Dr. Myerson 2 days ago and he did blood work has some plans - f/u in 3 weeks.  Sees Dr. Gardiner tomorrow to address major ostomy issues.  Considering ostomy revision. Retracted stoma.  Saw her mother and needed more supplies due to leaking.  Entocort now for 10 weeks, 6 mg a day, empties ostomy 6-9 times.  Has leaked but not in recent weeks.  Never on 9 mg and capri 6 mg for 10 weeks.  Try alternating 6 mg and 3 mg on alternate days.    ========================  21  55 yo female with CD now for 1 month f/u after telehealth and doing better 5 weeks into course of Entocort tx.  Does havd avascular necrosis of hips and understands there are risks of similar complications with Entocort therapy.  Was on fosamax. I just called Dr. Myerson to arrange an appoinment.  Ostomy - empties it 6 times so far today. Occasional abdominal pain.  On Entyvio every 4 weeks Entocort 9 mg a day x 5 weeks.  Asked to have CD4/CD8 checked, by sister's immunologist.   ===================== 8/3/21  55 yo female for  f/u due to beeing weak and in bed. In ER last week and they sent her home.  Meds: Entyvio 300 mg q 4 weeks Entocort x 1 week Off prednisone - had been on prednisone- she does not think it is what was helping her. On 1 cc mtx per week.  Devin Auguste is primary MD Saw him a month ago.  Main syx now.5 days ago was throwing up and was so distended. Could not poke her stomach. went to the ER Wednesday. N/V. Aciphex 1 twice daily. Last looked Never had an ulcer. Ileostomy in place.  Fever on Friday, up to 101. Eating now. Hungry yesterday  Not vomiting.  May neede EGD.     ====================== 21  56 yo female comes in for f/u.  She is "off" per Dr. Jaquez. She did an MRI. Dr. Jaquez reportedly thinks it is her meds. Has not seen a neurologist in years. Recent  labs were nl mostly with Hb 10.1, nl K-4.0 and Ca. Nl ESR and CRP, but had just finished medrol dose eliza.  Wants a Mg level. Labs to be sent to [facility]. Nl quantiferon.  Currently , getting Entyvio every 4 weeks.  Getting entyvio every 4 weeks. Taking 2 weeks off mtx due to stomatitis. If resuming, would reduce from 1 cc to .5 cc..  She is not sure how much she needs mtx and how much it has helped in the past. Mtx has very likely caused the stomatis. Or the pred medrol could have helped the mouth and belly. She has avn all over.  Myerson will see her and order bone scan  Addendum - - --21 Has thicker stools Has to change her bag because of high outputs She says her stoma is an outie  She is on pred 20 mg and should taper off it by going to 10 mg to 5 mg for a week and then d/c Risks of prednisone discussed.  Had dropped mtx from 25 to 12.5 and will go back up to 25 mg a week. Consider [facility] referral if not improvimg at that point. ============ 21  56 yo female with CD and ostomy for 5 month f/u.  Abd is good. Ostomy is functioning. Sleep thru much of night.  Appetite and intake is better. Oral CD problematic. Has been in touch with Dr. Pepe - - swish and spit.  Max-- 150. Current weight is 121.  Not taking supplements -- Boost and Ensure. 2-3 cans The y don't agree with her.. R Lactose free ice  She wants low dose prednisone. Lets try 2.5-5 mg a day.  ================== 20  56 yo female for 3 week TH f/u. Has upper abdominal pain that may be due to gallstones. Had referred her to Dr. Moreland but she did not call. She heard he was retiring and not. Has abdominal pain when she. Left upper quadrant or "upper rib cage" mostly left. He knows Dr. Talamantes. - Frustrated- - blisters on lip. Torn open. Has not tried steroid rinse or paste in past. Magic mouth wash helps. Does not reduce the blisters. Dr. Best- non steroid options for mouth   ======================== 10/23/20  10/23 56 yo female for 3 week  f/u. Has oral Crohn's and I-C and stoma issues. Entyvio may not cover the EIM. Might try oral steroid lozenge - - pred paste and lozenges.  Entyvio 300 q 4 weeks Mtx 1 cc q week Pred 5 mg may stop. Bentyl -  Abd pain is ok No idea what triggered her admission Food induced -  Consider medrol dose eliza in the future  Extensive lab review CBC - - Hct 31.6/Hb 10.9 plt 120 CRP 22 ESR 56 Iron 33% Ferrintin good ================================================== 10/2/20  56 yo female with Crohn's disease recently hosp 3 days at  for abd pain, N/V and got better with 3 dasy 3 days of iv steroids.  Surgery considered.  Was on 40 mg pred - intolerant anxiety  Now dropped to 30 mg a day. Now is 50-60% better than admission.  Surgery debated gallstones. They leaned toward blockage.  Has been on Stelara. Entyvio works better than Stelara. Mtx makes her tired She would like to up the dose of lomotil.  20  55 yo female with Crohnn's disease having continued active syx. Getting Entyvio every 4 weeks. Methotrexate 12.5 mg per week Dicyclomine tablets 2 po qid  == She has taken Xifaxan and it tears her up. Then stop it. Does not like flagyl and does not help.  Pepto 1-2 doses a day, does help the diarreha. She uses Immodium every day.  Stooling - - - empties bag 6-10 times a day. Abdominal pain - when she eats and intermittent. Energy - gone.  Labs- Anemia - Hb lower CRP is higher - -34 (was 16).   ========================== 20  Crohn's disease.   55 yo female with Crohn's disease comes in for f/u.  Dr. Jaquez is considering a GYN etiology.  Needs a pouch exam.  Getting Entyvio every 4 weeks.  Stelara did not work as well as Entyvio.  Normally when she gets an infusion, she gets a bounce and has not.  Prometheus Vedo level 3/25, just before 4 week dose was great_ __ _32.5 and no antibodies.  Feb 10 labs looked good CRP 29.9 and cbc looked fine. ESR elevation.  2 liters of miralax_ __ _ Try 6-8am _ __ _- and then an exam at 2-3 pm She asked about a pilonidal cyst and I directed to Dr. Talamantes  Xifaxan  Try pepto bismol 1-2 prn on days.    ===== 20  55 yo female comes in at my request for 3 week f/u of Crohn's due to new 4 x eleation of CRP at 29.9.  19 CRP was similarly elevated 4x at 2.1/.5  Leonid is checking CT abd-pelvis due to pelvic pain which is not characteristic of her Crohn's.  She is wondering about GYN. Or spastic bladder.  Dr. Talamantes thought stable but wants to scope to be sure.  Empties bag 10x a day.That is more than 3-6 months ago and has leakage of the ostomy bag. Maybe more volume. Very watery.  Review of labs from 2/10 show cRP 29.i9 but others are stable. CBC looks good with trace elevation of wbc and borderline anemia. Getting iv iron from Arianna Jaquez. Vit D is normal at 39. Albumin is normal at 4.5.  MRI 10/1/19 showed gallstones , present in past, and she has mild pain that is intermittent Liver tests are nl. Can assess with HIDA, and/or with surgical consult, e.g ???relative need for choly, high vs. low.    ================ 20  55 yo female with severe Crohn's disease and h/o breast cancer _ _ Arianna Jaquez MD on Letrasol. 10th year.  Abdominal pain is confusing. Has gotten pain in the ovary and in the uterus. GYN-Ann Laird MD and did US was fine. Sent to PT for pelvic treatment.  At her comment, I reviewed [facility] imaging 16 nl CTE 5/3/17 nl [facility] US Oct 2 2019 "Cholelithiasis" o/w MRE was nl - no inflammation. BUT THE MRI DID SHOW "PERSISTENT TETHERING OF UTERUS TO RECTAL STUMP"  On Entyvio 300 mg q 4 weeks. Next dose in 2 weeks On mtx _ _.5 cc. Monthly b12 shots  Eating ok, some weight loss. Weight stable x 6 months and down 30 lb since _ __ _- she is comfortable with current weight.  ============= 19  55 yo female with severe Crohn's and h/o breast cancer comes in for 3 month f/u.  Doing poorly.  Daily abd pain which is intermittent.  When she eats she gets pain and nausea.  Taking zofran and phenergan.  Sleep pattern is awry.  Empties bag 5 times a day and going out of rectum.  Has a loop ileostomy that may be problematic.  Allergic to tape.  Currently on Entyvio 300 mg q 8 weeks.  Stelara did not work as well.  Discussed the entyvio at shorter cycles of q 4 weeks to see if that will control syx better.  Outputs are higher and she may be volume depleted and maybe more entyvio will help.  last entyvio was last week and so a level won't be useful today. Will just proceed with dose increase.    ============== 18 53 yo female with Crohn's disease and breast cancer and diverting ileostomy from Dr. Talamantes.  Skin issue - addressed by Dr. Best.  See Brooke Talamantes MD and enterostomal therapist.  AGWS.  No fcs.     Review of Systems ConstitutionalDenies : body aches, fever, weight gain, weight loss CardiovascularDenies : chest pain, heart racing/skipping, high blood pressure RespiratoryDenies : chronic cough, shortness of breath, wheezing or asthma symptoms GastrointestinalDenies : Abdominal Pain/discomfort, Anal/Rectal Pain or Itching, Anal Spasm, Black Stool, Bloating/belching/gaseouness, Change of Bowel Habit, Constipation, Diarrhea/Loose Stool, Difficulty in Swallowing, Heartburn/esophageal reflux, Hemorrhoids, Indigestion, Mucus in Stool, Nausea/vomiting, Rectal Bleeding, Unintentional Weight Loss   Vitals Date Time BP Position Site L\R Cuff Size HR RR TEMP (F) WT HT BMI kg/m2 BSA m2 O2 Sat HC  2020 05:15 PM 133lbs 0oz 5' 8.5" 19.93 1.71       Assessment Crohn's Disease 555.2  Diarrhea 787.91/R19.7  Cholelithiasis 574.20/K80.20  Immunosuppression due to drug therapy V58.69/Z79.899   Problems Reconciled Plan OrdersPatient not identified as an unhealthy alcohol user when screene () - - 2020 Influenza immunization administered or previously received () - - 2020 BMI screening above normal () - - 2020 Current tobacco non-user (CAD, cap, COPD, PV) (dm) (IBD) (1036F, ) - - 2020 Colorectal cancer screening results documented and reviewed (PV) (3017F) - - 2020 Documentation of current medications. () - - 2020 EGD w/Biopsy if indicated (00495) - - 2020 Pouchoscopy (22635) - - 2020 CMP (comprehensive metabolic panel) (62693) - - 2020 Sed rate (03379) - - 2020 C-reactive protein (11870) - - 2020 Ferritin assay (76899) - - 2020 Iron + iron binding capacity panel ser/plas (20209, 93851) - - 2020 Vitamin B12 and folate measurement (86796, 95379) - - 2020 CBC with diff (75324) - - 2020 25-OH-Vitamin D (36210) - - 2020 GPP 22 (FilmArray) - Gastrointestinal Pathogen Panel - QDx (89744) - - 2020 MedicationsMedications have been Reconciled Transition of Care or Provider Policy InstructionsPatient seen today via telehealth by agreement and consent of patient in light of current COVID-19 pandemic. I used video conferencing during the visit. The patient encounter is appropriate and reasonable under the circumstances given the patient's particular presentation at this time. The patient has been advised of the followin) the potential risks and limitations of this mode of treatment (including but not limited to the absence of in-person examination); 2) the right to refuse telehealth services at any point without affecting the right to future care; 3) the right to receive in-person services, included immediately after this consultation if an urgent need arises; 4) information, including identifiable images or information from this telehealth consult, will only be shared in accordance with HIPPA regulations. Any and all of the patient's and/or patient's family member's questions on this issue have been answered. The patient has verbally consented to be treated via telehealth services. The patient has also been advised to contact this office for worsening conditions or problems, and seek emergency medical treatment and/or call 911 if the patient deems either necessary. (For commercial payers, telehealth video only) More than half of the face-to-face time used for counseling and coordination of care. 40 min appointment (75118 EP) I have documented a list of current medications and reviewed it with the patient. I encouraged the patient to diet and exercise. DispositionReturn To Clinic in 2 Months CorrespondenceCC this document (Slade Auguste MD, Brooke Talamantes MD, Arianna Jaquez MD) - 2020  35400 exam med 32 min time  needs refill or lomotil Can't eRx lomotil.  Aciphex refill.  Needs EGD and pouchoscopy        Electronically Signed by: Sánchez Perez MD -A  cc: note to Dr. Brooke Talamantes 23

## 2025-07-01 ENCOUNTER — TELEPHONE ENCOUNTER (OUTPATIENT)
Dept: URBAN - METROPOLITAN AREA CLINIC 98 | Facility: CLINIC | Age: 60
End: 2025-07-01

## 2025-07-01 ENCOUNTER — LAB OUTSIDE AN ENCOUNTER (OUTPATIENT)
Dept: URBAN - METROPOLITAN AREA CLINIC 98 | Facility: CLINIC | Age: 60
End: 2025-07-01

## 2025-07-01 PROBLEM — 34000006: Status: ACTIVE | Noted: 2025-07-01

## 2025-07-01 PROBLEM — 71833008: Status: ACTIVE | Noted: 2025-07-01

## 2025-07-14 ENCOUNTER — OFFICE VISIT (OUTPATIENT)
Dept: URBAN - METROPOLITAN AREA CLINIC 98 | Facility: CLINIC | Age: 60
End: 2025-07-14

## 2025-07-18 ENCOUNTER — TELEPHONE ENCOUNTER (OUTPATIENT)
Dept: URBAN - METROPOLITAN AREA CLINIC 98 | Facility: CLINIC | Age: 60
End: 2025-07-18

## 2025-08-01 ENCOUNTER — LAB OUTSIDE AN ENCOUNTER (OUTPATIENT)
Dept: URBAN - METROPOLITAN AREA CLINIC 98 | Facility: CLINIC | Age: 60
End: 2025-08-01

## 2025-08-04 ENCOUNTER — TELEPHONE ENCOUNTER (OUTPATIENT)
Dept: URBAN - METROPOLITAN AREA CLINIC 98 | Facility: CLINIC | Age: 60
End: 2025-08-04

## 2025-08-15 ENCOUNTER — TELEPHONE ENCOUNTER (OUTPATIENT)
Dept: URBAN - METROPOLITAN AREA CLINIC 98 | Facility: CLINIC | Age: 60
End: 2025-08-15

## 2025-08-15 RX ORDER — ERGOCALCIFEROL CAPSULES, 1.25 MG/1
1 CAPSULE CAPSULE ORAL
Qty: 12 CAPSULES | Refills: 3 | Status: ACTIVE | COMMUNITY

## 2025-08-15 RX ORDER — FOLIC ACID 1 MG/1
1 TABLET TABLET ORAL ONCE A DAY
Qty: 30 TABLET | Refills: 11 | Status: ACTIVE | COMMUNITY
End: 2026-01-03

## 2025-08-15 RX ORDER — PROMETHAZINE HYDROCHLORIDE 25 MG/1
TAKE 1 TABLETS TABLET ORAL BID
Qty: 60 TABLETS | Refills: 5 | Status: DISCONTINUED | COMMUNITY
Start: 2021-02-12

## 2025-08-15 RX ORDER — PROMETHAZINE HYDROCHLORIDE 6.25 MG/5ML
TAKE 5 ML BY MOUTH AT BEDTIME SOLUTION ORAL
Qty: 150 ML | Refills: 11 | Status: ACTIVE | COMMUNITY
End: 2026-01-03

## 2025-08-15 RX ORDER — LEUCOVORIN CALCIUM 10 MG/1
1 TABLET TABLET ORAL ONCE A DAY
Status: ACTIVE | COMMUNITY

## 2025-08-15 RX ORDER — ONDANSETRON 8 MG/1
1 TABLET ON THE TONGUE AND ALLOW TO DISSOLVE AS NEEDED TABLET, ORALLY DISINTEGRATING ORAL TWICE DAILY
Qty: 60 TABLETS | Refills: 11 | Status: ACTIVE | COMMUNITY
Start: 2024-01-24

## 2025-08-15 RX ORDER — PAROXETINE HYDROCHLORIDE HEMIHYDRATE 40 MG/1
1.5 TABLETS TABLET, FILM COATED ORAL ONCE A DAY
Status: ACTIVE | COMMUNITY

## 2025-08-15 RX ORDER — RISANKIZUMAB-RZAA 360 MG/2.4
360 MG WEARABLE INJECTOR SUBCUTANEOUS
Qty: 1 KIT | Refills: 8 | Status: ACTIVE | COMMUNITY
Start: 2022-10-19 | End: 2026-02-19

## 2025-08-15 RX ORDER — DICYCLOMINE HYDROCHLORIDE 20 MG/1
1 TABLET TABLET ORAL THREE TIMES A DAY
Qty: 90 TABLET | Refills: 11 | Status: ACTIVE | COMMUNITY

## 2025-08-15 RX ORDER — RABEPRAZOLE SODIUM 20 MG/1
TAKE 1 TABLET BY MOUTH TWICE DAILY TABLET, DELAYED RELEASE ORAL
Qty: 180 TABLETS | Refills: 3 | Status: ACTIVE | COMMUNITY

## 2025-08-15 RX ORDER — LOPERAMIDE HCL 2 MG
5 CAPSULES CAPSULE ORAL
Status: DISCONTINUED | COMMUNITY

## 2025-08-15 RX ORDER — METHADONE HYDROCHLORIDE 5 MG/1
1 TABLET TABLET ORAL ONCE A DAY
Refills: 0 | Status: ACTIVE | COMMUNITY

## 2025-08-15 RX ORDER — LORAZEPAM 1 MG/1
1 TABLET AT BEDTIME AS NEEDED TABLET ORAL ONCE A DAY
Status: ACTIVE | COMMUNITY

## 2025-08-15 RX ORDER — DIPHENOXYLATE HYDROCHLORIDE AND ATROPINE SULFATE 2.5; .025 MG/1; MG/1
6 TABLETS TABLET ORAL
Status: ACTIVE | COMMUNITY
Start: 2024-05-31

## 2025-08-15 RX ORDER — ZOLPIDEM TARTRATE 10 MG/1
1 TABLET AT BEDTIME AS NEEDED TABLET, FILM COATED ORAL ONCE A DAY
Status: ACTIVE | COMMUNITY

## 2025-08-15 RX ORDER — BACLOFEN 10 MG/1
1 TABLET AS NEEDED TABLET ORAL ONCE A DAY
Status: ACTIVE | COMMUNITY

## 2025-08-15 RX ORDER — OXYCODONE HYDROCHLORIDE AND ACETAMINOPHEN 7.5; 325 MG/1; MG/1
1 TABLET AS NEEDED TABLET ORAL
Refills: 0 | Status: ACTIVE | COMMUNITY

## 2025-08-15 RX ORDER — LOPERAMIDE HCL 2 MG/1
1 TABLET AS NEEDED TABLET, FILM COATED ORAL DAILY
Status: ACTIVE | COMMUNITY

## 2025-08-26 LAB
ALBUMIN: 4.1
ALKALINE PHOSPHATASE: 133
ALT (SGPT): 26
AST (SGOT): 20
BASO (ABSOLUTE): 0
BASOS: 1
BILIRUBIN, TOTAL: 0.3
BUN/CREATININE RATIO: 22
BUN: 19
C-REACTIVE PROTEIN, QUANT: 7
CALCIUM: 8.9
CARBON DIOXIDE, TOTAL: 18
CHLORIDE: 104
CREATININE: 0.86
EGFR: 78
EOS (ABSOLUTE): 0.2
EOS: 2
FERRITIN, SERUM: 1114
FOLATE (FOLIC ACID), SERUM: >20
GLOBULIN, TOTAL: 2.7
GLUCOSE: 81
HEMATOCRIT: 35.9
HEMATOLOGY COMMENTS:: (no result)
HEMOGLOBIN: 12
IMMATURE CELLS: (no result)
IMMATURE GRANS (ABS): 0
IMMATURE GRANULOCYTES: 0
IRON BIND.CAP.(TIBC): 217
IRON SATURATION: 26
IRON: 56
LIPASE: 29
LYMPHS (ABSOLUTE): 2.4
LYMPHS: 33
MCH: 35.1
MCHC: 33.4
MCV: 105
MONOCYTES(ABSOLUTE): 0.5
MONOCYTES: 7
NEUTROPHILS (ABSOLUTE): 4.1
NEUTROPHILS: 57
NRBC: (no result)
PLATELETS: 136
POTASSIUM: 4.3
PROTEIN, TOTAL: 6.8
RBC: 3.42
RDW: 11.8
SEDIMENTATION RATE-WESTERGREN: 27
SODIUM: 136
UIBC: 161
VITAMIN B12: 469
VITAMIN D, 25-HYDROXY: 30.1
WBC: 7.2